# Patient Record
Sex: FEMALE | Race: WHITE | NOT HISPANIC OR LATINO | Employment: FULL TIME | URBAN - METROPOLITAN AREA
[De-identification: names, ages, dates, MRNs, and addresses within clinical notes are randomized per-mention and may not be internally consistent; named-entity substitution may affect disease eponyms.]

---

## 2017-10-18 ENCOUNTER — COMMUNICATION - HEALTHEAST (OUTPATIENT)
Dept: TELEHEALTH | Facility: CLINIC | Age: 37
End: 2017-10-18

## 2017-10-18 ENCOUNTER — COMMUNICATION - HEALTHEAST (OUTPATIENT)
Dept: HEALTH INFORMATION MANAGEMENT | Facility: CLINIC | Age: 37
End: 2017-10-18

## 2020-10-07 ENCOUNTER — AMBULATORY - HEALTHEAST (OUTPATIENT)
Dept: MULTI SPECIALTY CLINIC | Facility: CLINIC | Age: 40
End: 2020-10-07

## 2020-10-07 LAB
CHLAMYDIA_EXT- HISTORICAL: NEGATIVE
SPECIMEN DESCRIPTION_EXT (HISTORICAL CONVERSION): NORMAL

## 2020-11-16 ENCOUNTER — AMBULATORY - HEALTHEAST (OUTPATIENT)
Dept: SURGERY | Facility: HOSPITAL | Age: 40
End: 2020-11-16

## 2020-11-16 DIAGNOSIS — Z11.59 ENCOUNTER FOR SCREENING FOR OTHER VIRAL DISEASES: ICD-10-CM

## 2020-11-17 ENCOUNTER — RECORDS - HEALTHEAST (OUTPATIENT)
Dept: ADMINISTRATIVE | Facility: OTHER | Age: 40
End: 2020-11-17

## 2020-11-18 ENCOUNTER — AMBULATORY - HEALTHEAST (OUTPATIENT)
Dept: SURGERY | Facility: AMBULATORY SURGERY CENTER | Age: 40
End: 2020-11-18

## 2020-11-18 DIAGNOSIS — Z11.59 ENCOUNTER FOR SCREENING FOR OTHER VIRAL DISEASES: ICD-10-CM

## 2020-11-25 ENCOUNTER — ANESTHESIA - HEALTHEAST (OUTPATIENT)
Dept: SURGERY | Facility: AMBULATORY SURGERY CENTER | Age: 40
End: 2020-11-25

## 2020-11-25 ASSESSMENT — MIFFLIN-ST. JEOR: SCORE: 1278.24

## 2020-11-27 ENCOUNTER — AMBULATORY - HEALTHEAST (OUTPATIENT)
Dept: LAB | Facility: CLINIC | Age: 40
End: 2020-11-27

## 2020-11-27 DIAGNOSIS — Z11.59 ENCOUNTER FOR SCREENING FOR OTHER VIRAL DISEASES: ICD-10-CM

## 2020-11-29 ENCOUNTER — COMMUNICATION - HEALTHEAST (OUTPATIENT)
Dept: SCHEDULING | Facility: CLINIC | Age: 40
End: 2020-11-29

## 2020-11-30 ENCOUNTER — SURGERY - HEALTHEAST (OUTPATIENT)
Dept: SURGERY | Facility: AMBULATORY SURGERY CENTER | Age: 40
End: 2020-11-30

## 2020-11-30 ASSESSMENT — MIFFLIN-ST. JEOR: SCORE: 1278.24

## 2020-12-01 LAB
HPV SOURCE: NORMAL
HUMAN PAPILLOMA VIRUS 16 DNA: NEGATIVE
HUMAN PAPILLOMA VIRUS 18 DNA: NEGATIVE
HUMAN PAPILLOMA VIRUS FINAL DIAGNOSIS: NORMAL
HUMAN PAPILLOMA VIRUS OTHER HR: NEGATIVE
SPECIMEN DESCRIPTION: NORMAL

## 2020-12-07 ENCOUNTER — RECORDS - HEALTHEAST (OUTPATIENT)
Dept: ADMINISTRATIVE | Facility: OTHER | Age: 40
End: 2020-12-07

## 2020-12-07 LAB
BKR LAB AP ABNORMAL BLEEDING: YES
BKR LAB AP BIRTH CONTROL/HORMONES: NORMAL
BKR LAB AP CERVICAL APPEARANCE: NORMAL
BKR LAB AP GYN ADEQUACY: NORMAL
BKR LAB AP GYN INTERPRETATION: NORMAL
BKR LAB AP GYN OTHER FINDINGS: NORMAL
BKR LAB AP HPV REFLEX: NORMAL
BKR LAB AP LMP: NORMAL
BKR LAB AP PATIENT STATUS: NO
BKR LAB AP PREVIOUS ABNORMAL: NORMAL
BKR LAB AP PREVIOUS NORMAL: NORMAL
HIGH RISK?: YES
PATH REPORT.COMMENTS IMP SPEC: NORMAL
RESULT FLAG (HE HISTORICAL CONVERSION): NORMAL

## 2021-05-30 ENCOUNTER — HEALTH MAINTENANCE LETTER (OUTPATIENT)
Age: 41
End: 2021-05-30

## 2021-06-05 VITALS — WEIGHT: 135 LBS | HEIGHT: 65 IN | BODY MASS INDEX: 22.49 KG/M2

## 2021-06-13 NOTE — ANESTHESIA POSTPROCEDURE EVALUATION
Mio Delacruz is a 89-year-old gentleman with a history of superficial bladder cancer and prostate cancer.  He is poorly compliant with follow-up and has not been seen in  11 months.  His PSA today is 0.25, having undergone radiation therapy several years ago.  He is overdue for his office cystoscopy.  He complains of urinary incontinence which he can avoid if he voids every 2 hours.  He is been told many times in the past that he has an atonic bladder is that requires intermittent catheterization and he refuses to do this.  Other past medical history: Anemia, hyperlipidemia, non-smoker.  Surgeries include seed implants, cholecystectomy, cystoscopy with bladder biopsy, prostatic biopsies  Medications: Reviewed  Allergies: Azithromycin, penicillin  Review of systems: No dysuria or hematuria  Urinalysis sent for cytology  Exam: Alert and oriented, normal external genitalia  Flexible cystoscopy-normal urethra, radiated prosthetic fossa that is open.  Large bladder residual, normal bladder mucosa, no papillary tumors or raised erythema, normal ureteral orifices  Creatinine 1.11 in October last year  Assessment: Prostate cancer-stable PSA.  Bladder cancer-no evidence of recurrence.  Urinary retention with incontinence due to overflow  Plan: Antibiotic x1 today.  He will consider learning again how to do intermittent catheterization to do every morning and every evening.  Follow-up with me in 6 months for office cystoscopy and PSA   Patient: Anahy Aquino  Procedure(s):  HYSTEROSCOPY WITH DILATION AND CURETTAGE, CERVICAL POLYPECTOMY AND PLACEMENT OF MIRENA INTRAUTERINE DEVICE  Anesthesia type: MAC    Patient location: Phase II Recovery  Last vitals:   Vitals Value Taken Time   /75 11/30/20 1310   Temp 36.6  C (97.9  F) 11/30/20 1251   Pulse 92 11/30/20 1400   Resp 16 11/30/20 1310   SpO2 100 % 11/30/20 1400   Vitals shown include unvalidated device data.  Post vital signs: stable  Level of consciousness: awake and responds to simple questions  Post-anesthesia pain: pain controlled  Post-anesthesia nausea and vomiting: no  Pulmonary: unassisted, return to baseline  Cardiovascular: stable and blood pressure at baseline  Hydration: adequate  Anesthetic events: no    QCDR Measures:  ASA# 11 - Eduarda-op Cardiac Arrest: ASA11B - Patient did NOT experience unanticipated cardiac arrest  ASA# 12 - Eduarda-op Mortality Rate: ASA12B - Patient did NOT die  ASA# 13 - PACU Re-Intubation Rate: NA - No ETT / LMA used for case  ASA# 10 - Composite Anes Safety: ASA10A - No serious adverse event    Additional Notes:

## 2021-06-13 NOTE — ANESTHESIA PREPROCEDURE EVALUATION
Anesthesia Evaluation      Patient summary reviewed   No history of anesthetic complications     Airway   Mallampati: I  Neck ROM: full   Pulmonary     breath sounds clear to auscultation  (+) asthma                           Cardiovascular - normal exam  Exercise tolerance: > or = 4 METS  Rhythm: regular  Rate: normal,         Neuro/Psych    (+) neuromuscular disease,  anxiety/panic attacks,     Comments: TBI  Migraine headaches    Endo/Other       Comments: PCOS  Endometriosis   Anemia    GI/Hepatic/Renal - negative ROS           Dental - normal exam                        Anesthesia Plan  Planned anesthetic: MAC  Multimodal pain regiment: Fentanyl, ketamine and toradol if approved by surgeon.  ASA 2     Anesthetic plan and risks discussed with: patient  Anesthesia plan special considerations: antiemetics,   Post-op plan: routine recovery

## 2021-06-13 NOTE — ANESTHESIA CARE TRANSFER NOTE
Last vitals:   Vitals:    11/30/20 1251   BP: 146/84   Pulse: 93   Resp: 14   Temp: 36.6  C (97.9  F)   SpO2: 100%     Pt brought to phase 2 on 6L O2. Monitors applied. VSS.    Patient's level of consciousness is drowsy  Spontaneous respirations: yes  Maintains airway independently: yes  Dentition unchanged: yes  Oropharynx: oropharynx clear of all foreign objects    QCDR Measures:  ASA# 20 - Surgical Safety Checklist: WHO surgical safety checklist completed prior to induction    PQRS# 430 - Adult PONV Prevention: 4558F - Pt received => 2 anti-emetic agents (different classes) preop & intraop  ASA# 8 - Peds PONV Prevention: NA - Not pediatric patient, not GA or 2 or more risk factors NOT present  PQRS# 424 - Eduarda-op Temp Management: 4559F - At least one body temp DOCUMENTED => 35.5C or 95.9F within required timeframe  PQRS# 426 - PACU Transfer Protocol: - Transfer of care checklist used  ASA# 14 - Acute Post-op Pain: ASA14B - Patient did NOT experience pain >= 7 out of 10

## 2021-06-16 PROBLEM — L71.9 ROSACEA: Status: ACTIVE | Noted: 2017-08-10

## 2021-06-16 PROBLEM — R73.09 ABNORMAL GLUCOSE: Status: ACTIVE | Noted: 2018-09-26

## 2021-06-16 PROBLEM — L70.0 ACNE VULGARIS: Status: ACTIVE | Noted: 2017-08-10

## 2021-06-16 PROBLEM — R07.89 LEFT-SIDED CHEST WALL PAIN: Status: ACTIVE | Noted: 2018-09-26

## 2021-06-16 PROBLEM — Z87.42 HISTORY OF PCOS: Status: ACTIVE | Noted: 2017-08-10

## 2021-06-16 PROBLEM — F41.9 ANXIETY: Status: ACTIVE | Noted: 2017-08-10

## 2021-06-16 PROBLEM — J45.40 MODERATE PERSISTENT ASTHMA WITHOUT COMPLICATION: Status: ACTIVE | Noted: 2017-08-10

## 2021-06-16 PROBLEM — H10.023 PINK EYE DISEASE OF BOTH EYES: Status: ACTIVE | Noted: 2018-10-24

## 2021-06-16 PROBLEM — H61.23 BILATERAL IMPACTED CERUMEN: Status: ACTIVE | Noted: 2018-09-26

## 2021-06-16 PROBLEM — S06.0XAA CONCUSSION: Status: ACTIVE | Noted: 2017-08-10

## 2021-08-10 ENCOUNTER — HOSPITAL ENCOUNTER (EMERGENCY)
Facility: CLINIC | Age: 41
Discharge: HOME | End: 2021-08-10
Admitting: NURSE PRACTITIONER
Payer: COMMERCIAL

## 2021-08-10 VITALS
OXYGEN SATURATION: 99 % | DIASTOLIC BLOOD PRESSURE: 77 MMHG | RESPIRATION RATE: 22 BRPM | TEMPERATURE: 97.8 F | HEART RATE: 102 BPM | SYSTOLIC BLOOD PRESSURE: 145 MMHG
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
## 2021-08-10 DIAGNOSIS — S61.213A LACERATION OF LEFT MIDDLE FINGER WITHOUT FOREIGN BODY WITHOUT DAMAGE TO NAIL, INITIAL ENCOUNTER: ICD-10-CM

## 2021-08-10 PROCEDURE — 99283 EMERGENCY DEPT VISIT LOW MDM: CPT | Mod: 25

## 2021-08-10 PROCEDURE — 90715 TDAP VACCINE 7 YRS/> IM: CPT | Performed by: NURSE PRACTITIONER

## 2021-08-10 PROCEDURE — 250N000009 HC RX 250: Performed by: NURSE PRACTITIONER

## 2021-08-10 PROCEDURE — 250N000011 HC RX IP 250 OP 636: Performed by: NURSE PRACTITIONER

## 2021-08-10 PROCEDURE — 90471 IMMUNIZATION ADMIN: CPT | Performed by: NURSE PRACTITIONER

## 2021-08-10 PROCEDURE — 12001 RPR S/N/AX/GEN/TRNK 2.5CM/<: CPT

## 2021-08-10 RX ORDER — BACITRACIN ZINC 500 [USP'U]/G
OINTMENT TOPICAL ONCE
Status: COMPLETED | OUTPATIENT
Start: 2021-08-10 | End: 2021-08-10

## 2021-08-10 RX ADMIN — BACITRACIN: 500 OINTMENT TOPICAL at 18:13

## 2021-08-10 RX ADMIN — CLOSTRIDIUM TETANI TOXOID ANTIGEN (FORMALDEHYDE INACTIVATED), CORYNEBACTERIUM DIPHTHERIAE TOXOID ANTIGEN (FORMALDEHYDE INACTIVATED), BORDETELLA PERTUSSIS TOXOID ANTIGEN (GLUTARALDEHYDE INACTIVATED), BORDETELLA PERTUSSIS FILAMENTOUS HEMAGGLUTININ ANTIGEN (FORMALDEHYDE INACTIVATED), BORDETELLA PERTUSSIS PERTACTIN ANTIGEN, AND BORDETELLA PERTUSSIS FIMBRIAE 2/3 ANTIGEN 0.5 ML: 5; 2; 2.5; 5; 3; 5 INJECTION, SUSPENSION INTRAMUSCULAR at 17:15

## 2021-08-10 ASSESSMENT — ENCOUNTER SYMPTOMS
NUMBNESS: 0
WEAKNESS: 0

## 2021-08-10 NOTE — ED NOTES
Introduced self to patient.  Whiteboard updated.  Plan of care and length of time discussed with patient.  Will continue to monitor. Carolina Hernandez RN.......8/10/2021 6:19 PM

## 2021-08-10 NOTE — ED PROVIDER NOTES
EMERGENCY DEPARTMENT ENCOUNTER      NAME: Anahy Aquino  AGE: 41 year old female  YOB: 1980  MRN: [id number]  EVALUATION DATE & TIME: 8/10/2021  4:39 PM    PCP: No primary care provider on file.    ED PROVIDER: SASHA Hi, CNP      Chief Complaint   Patient presents with     Laceration         FINAL IMPRESSION:  1. Laceration of left middle finger without foreign body without damage to nail, initial encounter    2. Dog bite, initial encounter          ED COURSE & MEDICAL DECISION MAKIN:49 PM I met with the patient, obtained history, performed an initial exam, and discussed options and plan for treatment here in the ED.    5:14 PM I injected more anesthetic to patient's laceration.    5:33 PM I performed laceration repair on the patient.     Pertinent Labs & Imaging studies reviewed. (See chart for details)  41 year old female presents to the Emergency Department for evaluation of laceration to the left middle finger.  This was caused by a dog.  Dog is known and up-to-date with immunizations and can be observed.  Patient's tetanus was updated.  CMS intact in the left hand.  Wound was soaked, irrigated, and then surgically repaired.  Placed on a 5-day course of Augmentin.  She states she can tolerate Augmentin though amoxicillin is listed as an allergy.  Recommend suture removal in 10 days.  Advised to return with any new/worsening symptoms or for other concerns    At the conclusion of the encounter I discussed the results of all of the tests and the disposition. The questions were answered. The patient or family acknowledged understanding and was agreeable with the care plan.       MEDICATIONS GIVEN IN THE EMERGENCY:  Medications   bacitracin ointment (has no administration in time range)   Tdap (tetanus-diphtheria-acell pertussis) (ADACEL) injection 0.5 mL (0.5 mLs Intramuscular Given 8/10/21 1315)       NEW PRESCRIPTIONS STARTED AT TODAY'S ER VISIT  New Prescriptions     "AMOXICILLIN-CLAVULANATE (AUGMENTIN) 875-125 MG TABLET    Take 1 tablet by mouth 2 times daily for 5 days            =================================================================    HPI    Patient information was obtained from: patient     Use of Intrepreter: N/A      Anahy Aquino is a 41 year old female with no relevant history who presents finger laceration.    Patient reports she was trying to keep the dog inside when it became \"cranky\" and bit her left middle finger developing 2 1/2 cm laceration. She ran the laceration under water immediately, bleeding was controlled, wrapped and elevated. Dog is UTD with immunizations. Last tetanus shot was in 2013 and she agrees to get another shot if needed. Notes she is allergic to morphine and amoxicillin. Denies numbness, weakness. No other complaints at this time.       REVIEW OF SYSTEMS   Review of Systems   Skin:        Positive for 2 1/2 cm laceration on left middle finger.   Neurological: Negative for weakness and numbness.   All other systems reviewed and are negative.       PAST MEDICAL HISTORY:  Past Medical History:   Diagnosis Date     Anemia      Anxiety      Asthma      Endometriosis      PCOS (polycystic ovarian syndrome)        PAST SURGICAL HISTORY:  Past Surgical History:   Procedure Laterality Date     ANKLE SURGERY       BRACHIAL PLEXUS EXPLORATION       LAPAROSCOPIC ENDOMETRIOSIS FULGURATION       GA HYSTEROSCOPY,W/ENDO BX N/A 11/30/2020    Procedure: HYSTEROSCOPY WITH DILATION AND CURETTAGE, CERVICAL POLYPECTOMY AND PLACEMENT OF MIRENA INTRAUTERINE DEVICE;  Surgeon: Jossy Rodriguez MD;  Location: [facility];  Service: Gynecology     TEAR DUCT SURGERY             CURRENT MEDICATIONS:    Prior to Admission Medications   Prescriptions Last Dose Informant Patient Reported? Taking?   DULoxetine (CYMBALTA) 30 MG capsule   Yes No   Sig: [DULOXETINE (CYMBALTA) 30 MG CAPSULE] Take 60 mg by mouth.   LORazepam (ATIVAN) 1 MG tablet   Yes No "   Sig: [LORAZEPAM (ATIVAN) 1 MG TABLET]    SYMBICORT 160-4.5 mcg/actuation inhaler   Yes No   Sig: [SYMBICORT 160-4.5 MCG/ACTUATION INHALER] INL 2 PFS PO BID   acetaminophen-codeine (TYLENOL #3) 300-30 mg per tablet   No No   Sig: [ACETAMINOPHEN-CODEINE (TYLENOL #3) 300-30 MG PER TABLET] Take 1-2 tablets by mouth every 6 (six) hours as needed for pain.   acetaminophen-codeine (TYLENOL #3) 300-30 mg per tablet   No No   Sig: [ACETAMINOPHEN-CODEINE (TYLENOL #3) 300-30 MG PER TABLET] Take 1 tablet by mouth every 6 (six) hours as needed for pain.   albuterol (PROAIR HFA;PROVENTIL HFA;VENTOLIN HFA) 90 mcg/actuation inhaler   Yes No   Sig: [ALBUTEROL (PROAIR HFA;PROVENTIL HFA;VENTOLIN HFA) 90 MCG/ACTUATION INHALER] Inhale 2 puffs.   montelukast (SINGULAIR) 10 mg tablet   Yes No   Sig: [MONTELUKAST (SINGULAIR) 10 MG TABLET] TK 1 T PO HS      Facility-Administered Medications: None           ALLERGIES:  Allergies   Allergen Reactions     Amoxicillin Unknown     Letrozole Unknown     Vicodin [Hydrocodone-Acetaminophen] Unknown       FAMILY HISTORY:  History reviewed. No pertinent family history.    SOCIAL HISTORY:   Social History     Socioeconomic History     Marital status:      Spouse name: None     Number of children: None     Years of education: None     Highest education level: None   Occupational History     None   Tobacco Use     Smoking status: Never Smoker     Smokeless tobacco: Never Used   Substance and Sexual Activity     Alcohol use: Yes     Drug use: Never     Sexual activity: None   Other Topics Concern     None   Social History Narrative     None     Social Determinants of Health     Financial Resource Strain:      Difficulty of Paying Living Expenses:    Food Insecurity:      Worried About Running Out of Food in the Last Year:      Ran Out of Food in the Last Year:    Transportation Needs:      Lack of Transportation (Medical):      Lack of Transportation (Non-Medical):    Physical Activity:       Days of Exercise per Week:      Minutes of Exercise per Session:    Stress:      Feeling of Stress :    Social Connections:      Frequency of Communication with Friends and Family:      Frequency of Social Gatherings with Friends and Family:      Attends [employer] Services:      Active Member of Clubs or Organizations:      Attends Club or Organization Meetings:      Marital Status:    Intimate Partner Violence:      Fear of Current or Ex-Partner:      Emotionally Abused:      Physically Abused:      Sexually Abused:          VITALS:  Patient Vitals for the past 24 hrs:   BP Temp Temp src Pulse Resp SpO2   08/10/21 1634 (!) 145/77 97.8  F (36.6  C) Oral 102 22 99 %       PHYSICAL EXAM    Constitutional:  Well developed, well nourished, no distress   HENT:  Atraumatic  Respiratory:  Normal, nonlabored respirations  Musculoskeletal:  Able to move all digits. All 3 layers of tendon function intact. Intrinsic muscle function of the left thumb intact. Motor and sensory function in the radial, medial, and ulnar distributions intact.   Integument: 2.5 cm, linear laceration on the left middle finger between the MCP and PIP joint.  It is on the surface adjacent to the ring finger.  Does extend into subcutaneous tissue.  I cannot visualize and the underlying tendon injury.  Explored to the base in a bloodless field and no foreign body was seen.  Neurologic:  Alert and oriented x 3        LAB:  All pertinent labs reviewed and interpreted.       RADIOLOGY:  Reviewed all pertinent imaging. Please see official radiology report.  No orders to display         PROCEDURES:   PROCEDURE: Laceration Repair   INDICATIONS: Laceration   PROCEDURE PROVIDER:  Juan Pratt CNP   SITE: Left middle finger   TYPE/SIZE: simple, clean and no foreign body visualized  2 1/2 cm (total length)   FUNCTIONAL ASSESSMENT: Distal sensation, circulation and motor intact   MEDICATION: 6 mLs of 50/50 mix of 1% Lidocaine and 0.25% Bupivacaine without  epinephrine   PREPARATION: soaking with Betadine and Normal Saline   DEBRIDEMENT: no debridement   CLOSURE:  Wound was closed in   one layer: Skin closed with 5 stitches of 4-0 Ethilon    Total number of sutures/staples placed: 5            I, Roxana Sanchez, am serving as a scribe to document services personally performed by Juan Pratt CNP. based on my observation and the provider's statements to me. IJuan CNP attest that Roxana Sanchez is acting in a scribe capacity, has observed my performance of the services and has documented them in accordance with my direction.    SASHA Hi, CNP  Emergency Medicine  [facility] EMERGENCY ROOM  [address] [place] [id number]  [phone]  Dept: [phone]         Juan Pratt APRN CNP  08/10/21 2156

## 2021-08-10 NOTE — ED TRIAGE NOTES
Laceration to finger from a dog bite.  Pt states she was told dog is UTD on immunizations.  Bleeding controlled.  Pt thinks her last tetanus in 2016 but system says 2013.

## 2021-08-10 NOTE — DISCHARGE INSTRUCTIONS
We cleaned and closed the wound in the ER today.      TO CARE FOR THE WOUND AT HOME:  Keep the wound CLEAN,DRY and COVERED until the sutures are out.  Take Ibuprofen 600mg every 6 hours with food as needed for any pain.  This wound is high risk for infection.  To prevent infection, take Augmentin as prescribed.  Watch for signs of infection (redness, increasing pain or yellow drainage) and if they develop, come back to the Emergency Department immediately for treatment.  Follow up in clinic in 10 days for sutureremoval.   ---------------------------------------------------------------------------------------------------

## 2021-08-11 ENCOUNTER — HOSPITAL ENCOUNTER (INPATIENT)
Facility: CLINIC | Age: 41
LOS: 3 days | Discharge: HOME | End: 2021-08-14
Attending: EMERGENCY MEDICINE | Admitting: FAMILY MEDICINE
Payer: COMMERCIAL

## 2021-08-11 ENCOUNTER — APPOINTMENT (OUTPATIENT)
Dept: MRI IMAGING | Facility: CLINIC | Age: 41
End: 2021-08-11
Attending: PHYSICIAN ASSISTANT
Payer: COMMERCIAL

## 2021-08-11 ENCOUNTER — APPOINTMENT (OUTPATIENT)
Dept: RADIOLOGY | Facility: CLINIC | Age: 41
End: 2021-08-11
Attending: EMERGENCY MEDICINE
Payer: COMMERCIAL

## 2021-08-11 DIAGNOSIS — Z78.9 HISTORY OF DOG BITE: ICD-10-CM

## 2021-08-11 DIAGNOSIS — E87.6 HYPOKALEMIA: ICD-10-CM

## 2021-08-11 DIAGNOSIS — L03.114 CELLULITIS OF HAND, LEFT: ICD-10-CM

## 2021-08-11 DIAGNOSIS — M65.141 SUPPURATIVE TENOSYNOVITIS OF FLEXOR TENDON OF RIGHT HAND: ICD-10-CM

## 2021-08-11 LAB
ANION GAP SERPL CALCULATED.3IONS-SCNC: 10 MMOL/L (ref 5–18)
BASOPHILS # BLD AUTO: 0.1 10E3/UL (ref 0–0.2)
BASOPHILS NFR BLD AUTO: 0 %
BUN SERPL-MCNC: 10 MG/DL (ref 8–22)
C REACTIVE PROTEIN LHE: 2.4 MG/DL (ref 0–0.8)
CALCIUM SERPL-MCNC: 8.2 MG/DL (ref 8.5–10.5)
CHLORIDE BLD-SCNC: 106 MMOL/L (ref 98–107)
CO2 SERPL-SCNC: 23 MMOL/L (ref 22–31)
CREAT SERPL-MCNC: 0.7 MG/DL (ref 0.6–1.1)
EOSINOPHIL # BLD AUTO: 0.1 10E3/UL (ref 0–0.7)
EOSINOPHIL NFR BLD AUTO: 1 %
ERYTHROCYTE [DISTWIDTH] IN BLOOD BY AUTOMATED COUNT: 13.8 % (ref 10–15)
ERYTHROCYTE [DISTWIDTH] IN BLOOD BY AUTOMATED COUNT: 13.9 % (ref 10–15)
GFR SERPL CREATININE-BSD FRML MDRD: >90 ML/MIN/1.73M2
GLUCOSE BLD-MCNC: 85 MG/DL (ref 70–125)
HCT VFR BLD AUTO: 36.8 % (ref 35–47)
HCT VFR BLD AUTO: 37.4 % (ref 35–47)
HGB BLD-MCNC: 12.1 G/DL (ref 11.7–15.7)
HGB BLD-MCNC: 12.5 G/DL (ref 11.7–15.7)
HOLD SPECIMEN: NORMAL
IMM GRANULOCYTES # BLD: 0.1 10E3/UL
IMM GRANULOCYTES NFR BLD: 0 %
LACTATE SERPL-SCNC: 0.8 MMOL/L (ref 0.7–2)
LYMPHOCYTES # BLD AUTO: 2.2 10E3/UL (ref 0.8–5.3)
LYMPHOCYTES NFR BLD AUTO: 15 %
MAGNESIUM SERPL-MCNC: 2.2 MG/DL (ref 1.8–2.6)
MCH RBC QN AUTO: 27.9 PG (ref 26.5–33)
MCH RBC QN AUTO: 28.5 PG (ref 26.5–33)
MCHC RBC AUTO-ENTMCNC: 32.9 G/DL (ref 31.5–36.5)
MCHC RBC AUTO-ENTMCNC: 33.4 G/DL (ref 31.5–36.5)
MCV RBC AUTO: 85 FL (ref 78–100)
MCV RBC AUTO: 85 FL (ref 78–100)
MONOCYTES # BLD AUTO: 0.7 10E3/UL (ref 0–1.3)
MONOCYTES NFR BLD AUTO: 5 %
NEUTROPHILS # BLD AUTO: 11.3 10E3/UL (ref 1.6–8.3)
NEUTROPHILS NFR BLD AUTO: 79 %
NRBC # BLD AUTO: 0 10E3/UL
NRBC BLD AUTO-RTO: 0 /100
PLATELET # BLD AUTO: 392 10E3/UL (ref 150–450)
PLATELET # BLD AUTO: 398 10E3/UL (ref 150–450)
POTASSIUM BLD-SCNC: 3 MMOL/L (ref 3.5–5)
POTASSIUM BLD-SCNC: 3.2 MMOL/L (ref 3.5–5)
POTASSIUM BLD-SCNC: 3.4 MMOL/L (ref 3.5–5)
RBC # BLD AUTO: 4.33 10E6/UL (ref 3.8–5.2)
RBC # BLD AUTO: 4.39 10E6/UL (ref 3.8–5.2)
SARS-COV-2 RNA RESP QL NAA+PROBE: NEGATIVE
SODIUM SERPL-SCNC: 139 MMOL/L (ref 136–145)
WBC # BLD AUTO: 14.4 10E3/UL (ref 4–11)
WBC # BLD AUTO: 14.4 10E3/UL (ref 4–11)

## 2021-08-11 PROCEDURE — 87040 BLOOD CULTURE FOR BACTERIA: CPT | Performed by: FAMILY MEDICINE

## 2021-08-11 PROCEDURE — 250N000013 HC RX MED GY IP 250 OP 250 PS 637: Performed by: FAMILY MEDICINE

## 2021-08-11 PROCEDURE — 36415 COLL VENOUS BLD VENIPUNCTURE: CPT | Performed by: FAMILY MEDICINE

## 2021-08-11 PROCEDURE — 84132 ASSAY OF SERUM POTASSIUM: CPT | Performed by: FAMILY MEDICINE

## 2021-08-11 PROCEDURE — 83735 ASSAY OF MAGNESIUM: CPT | Performed by: EMERGENCY MEDICINE

## 2021-08-11 PROCEDURE — 86141 C-REACTIVE PROTEIN HS: CPT | Performed by: EMERGENCY MEDICINE

## 2021-08-11 PROCEDURE — 120N000001 HC R&B MED SURG/OB

## 2021-08-11 PROCEDURE — 87635 SARS-COV-2 COVID-19 AMP PRB: CPT | Performed by: EMERGENCY MEDICINE

## 2021-08-11 PROCEDURE — 36415 COLL VENOUS BLD VENIPUNCTURE: CPT | Performed by: EMERGENCY MEDICINE

## 2021-08-11 PROCEDURE — 36415 COLL VENOUS BLD VENIPUNCTURE: CPT | Performed by: PHYSICIAN ASSISTANT

## 2021-08-11 PROCEDURE — 258N000003 HC RX IP 258 OP 636: Performed by: FAMILY MEDICINE

## 2021-08-11 PROCEDURE — 99207 PR CDG-CODE CATEGORY CHANGED: CPT | Performed by: FAMILY MEDICINE

## 2021-08-11 PROCEDURE — 80048 BASIC METABOLIC PNL TOTAL CA: CPT | Performed by: EMERGENCY MEDICINE

## 2021-08-11 PROCEDURE — C9803 HOPD COVID-19 SPEC COLLECT: HCPCS

## 2021-08-11 PROCEDURE — 83605 ASSAY OF LACTIC ACID: CPT | Performed by: FAMILY MEDICINE

## 2021-08-11 PROCEDURE — 85025 COMPLETE CBC W/AUTO DIFF WBC: CPT | Performed by: EMERGENCY MEDICINE

## 2021-08-11 PROCEDURE — 96367 TX/PROPH/DG ADDL SEQ IV INF: CPT

## 2021-08-11 PROCEDURE — 99223 1ST HOSP IP/OBS HIGH 75: CPT | Performed by: FAMILY MEDICINE

## 2021-08-11 PROCEDURE — 85014 HEMATOCRIT: CPT | Performed by: PHYSICIAN ASSISTANT

## 2021-08-11 PROCEDURE — 73130 X-RAY EXAM OF HAND: CPT | Mod: LT

## 2021-08-11 PROCEDURE — 96365 THER/PROPH/DIAG IV INF INIT: CPT

## 2021-08-11 PROCEDURE — 99285 EMERGENCY DEPT VISIT HI MDM: CPT | Mod: 25

## 2021-08-11 PROCEDURE — 96375 TX/PRO/DX INJ NEW DRUG ADDON: CPT

## 2021-08-11 PROCEDURE — 73218 MRI UPPER EXTREMITY W/O DYE: CPT | Mod: LT

## 2021-08-11 PROCEDURE — 36592 COLLECT BLOOD FROM PICC: CPT | Performed by: INTERNAL MEDICINE

## 2021-08-11 PROCEDURE — 258N000003 HC RX IP 258 OP 636: Performed by: EMERGENCY MEDICINE

## 2021-08-11 PROCEDURE — 250N000011 HC RX IP 250 OP 636: Performed by: EMERGENCY MEDICINE

## 2021-08-11 PROCEDURE — 250N000011 HC RX IP 250 OP 636: Performed by: FAMILY MEDICINE

## 2021-08-11 PROCEDURE — 84703 CHORIONIC GONADOTROPIN ASSAY: CPT | Performed by: INTERNAL MEDICINE

## 2021-08-11 RX ORDER — LORAZEPAM 1 MG/1
1 TABLET ORAL DAILY PRN
Status: DISCONTINUED | OUTPATIENT
Start: 2021-08-11 | End: 2021-08-14 | Stop reason: HOSPADM

## 2021-08-11 RX ORDER — KETOROLAC TROMETHAMINE 15 MG/ML
15 INJECTION, SOLUTION INTRAMUSCULAR; INTRAVENOUS ONCE
Status: COMPLETED | OUTPATIENT
Start: 2021-08-11 | End: 2021-08-11

## 2021-08-11 RX ORDER — POTASSIUM CHLORIDE 1500 MG/1
40 TABLET, EXTENDED RELEASE ORAL ONCE
Status: COMPLETED | OUTPATIENT
Start: 2021-08-11 | End: 2021-08-11

## 2021-08-11 RX ORDER — ALBUTEROL SULFATE 90 UG/1
2 AEROSOL, METERED RESPIRATORY (INHALATION) EVERY 6 HOURS PRN
Status: DISCONTINUED | OUTPATIENT
Start: 2021-08-11 | End: 2021-08-14 | Stop reason: HOSPADM

## 2021-08-11 RX ORDER — ONDANSETRON 2 MG/ML
4 INJECTION INTRAMUSCULAR; INTRAVENOUS EVERY 6 HOURS PRN
Status: DISCONTINUED | OUTPATIENT
Start: 2021-08-11 | End: 2021-08-14 | Stop reason: HOSPADM

## 2021-08-11 RX ORDER — MONTELUKAST SODIUM 10 MG/1
10 TABLET ORAL AT BEDTIME
Status: DISCONTINUED | OUTPATIENT
Start: 2021-08-11 | End: 2021-08-14 | Stop reason: HOSPADM

## 2021-08-11 RX ORDER — NALOXONE HYDROCHLORIDE 0.4 MG/ML
0.2 INJECTION, SOLUTION INTRAMUSCULAR; INTRAVENOUS; SUBCUTANEOUS
Status: DISCONTINUED | OUTPATIENT
Start: 2021-08-11 | End: 2021-08-14 | Stop reason: HOSPADM

## 2021-08-11 RX ORDER — PIPERACILLIN SODIUM, TAZOBACTAM SODIUM 3; .375 G/15ML; G/15ML
3.38 INJECTION, POWDER, LYOPHILIZED, FOR SOLUTION INTRAVENOUS EVERY 8 HOURS
Status: DISCONTINUED | OUTPATIENT
Start: 2021-08-11 | End: 2021-08-14 | Stop reason: HOSPADM

## 2021-08-11 RX ORDER — ACETAMINOPHEN 325 MG/1
975 TABLET ORAL 3 TIMES DAILY
Status: DISCONTINUED | OUTPATIENT
Start: 2021-08-11 | End: 2021-08-14 | Stop reason: HOSPADM

## 2021-08-11 RX ORDER — PIPERACILLIN SODIUM, TAZOBACTAM SODIUM 3; .375 G/15ML; G/15ML
3.38 INJECTION, POWDER, LYOPHILIZED, FOR SOLUTION INTRAVENOUS ONCE
Status: COMPLETED | OUTPATIENT
Start: 2021-08-11 | End: 2021-08-11

## 2021-08-11 RX ORDER — BUDESONIDE AND FORMOTEROL FUMARATE DIHYDRATE 160; 4.5 UG/1; UG/1
2 AEROSOL RESPIRATORY (INHALATION) 2 TIMES DAILY
Status: DISCONTINUED | OUTPATIENT
Start: 2021-08-11 | End: 2021-08-14 | Stop reason: HOSPADM

## 2021-08-11 RX ORDER — POTASSIUM CHLORIDE 7.45 MG/ML
10 INJECTION INTRAVENOUS ONCE
Status: COMPLETED | OUTPATIENT
Start: 2021-08-11 | End: 2021-08-11

## 2021-08-11 RX ORDER — HYDROMORPHONE HCL IN WATER/PF 6 MG/30 ML
.2-.4 PATIENT CONTROLLED ANALGESIA SYRINGE INTRAVENOUS
Status: DISCONTINUED | OUTPATIENT
Start: 2021-08-11 | End: 2021-08-14 | Stop reason: HOSPADM

## 2021-08-11 RX ORDER — LORATADINE PSEUDOEPHEDRINE SULFATE 10; 240 MG/1; MG/1
1 TABLET, EXTENDED RELEASE ORAL DAILY
COMMUNITY
End: 2023-07-18

## 2021-08-11 RX ORDER — ONDANSETRON 4 MG/1
4 TABLET, ORALLY DISINTEGRATING ORAL EVERY 6 HOURS PRN
Status: DISCONTINUED | OUTPATIENT
Start: 2021-08-11 | End: 2021-08-14 | Stop reason: HOSPADM

## 2021-08-11 RX ORDER — LORAZEPAM 2 MG/ML
1 INJECTION INTRAMUSCULAR ONCE
Status: COMPLETED | OUTPATIENT
Start: 2021-08-11 | End: 2021-08-11

## 2021-08-11 RX ORDER — NALOXONE HYDROCHLORIDE 0.4 MG/ML
0.4 INJECTION, SOLUTION INTRAMUSCULAR; INTRAVENOUS; SUBCUTANEOUS
Status: DISCONTINUED | OUTPATIENT
Start: 2021-08-11 | End: 2021-08-14 | Stop reason: HOSPADM

## 2021-08-11 RX ORDER — LIDOCAINE 40 MG/G
CREAM TOPICAL
Status: DISCONTINUED | OUTPATIENT
Start: 2021-08-11 | End: 2021-08-14 | Stop reason: HOSPADM

## 2021-08-11 RX ORDER — MORPHINE SULFATE 4 MG/ML
4 INJECTION, SOLUTION INTRAMUSCULAR; INTRAVENOUS ONCE
Status: COMPLETED | OUTPATIENT
Start: 2021-08-11 | End: 2021-08-11

## 2021-08-11 RX ORDER — ACETAMINOPHEN 500 MG
1000 TABLET ORAL EVERY 6 HOURS PRN
COMMUNITY
End: 2023-02-28

## 2021-08-11 RX ORDER — DULOXETIN HYDROCHLORIDE 60 MG/1
60 CAPSULE, DELAYED RELEASE ORAL DAILY
Status: DISCONTINUED | OUTPATIENT
Start: 2021-08-12 | End: 2021-08-14 | Stop reason: HOSPADM

## 2021-08-11 RX ORDER — SODIUM CHLORIDE 9 MG/ML
INJECTION, SOLUTION INTRAVENOUS CONTINUOUS
Status: DISCONTINUED | OUTPATIENT
Start: 2021-08-11 | End: 2021-08-12

## 2021-08-11 RX ORDER — POTASSIUM CHLORIDE 1500 MG/1
40 TABLET, EXTENDED RELEASE ORAL ONCE
Status: COMPLETED | OUTPATIENT
Start: 2021-08-12 | End: 2021-08-12

## 2021-08-11 RX ORDER — POLYETHYLENE GLYCOL 3350 17 G/17G
17 POWDER, FOR SOLUTION ORAL DAILY
Status: DISCONTINUED | OUTPATIENT
Start: 2021-08-11 | End: 2021-08-12

## 2021-08-11 RX ORDER — HYDRALAZINE HYDROCHLORIDE 20 MG/ML
5 INJECTION INTRAMUSCULAR; INTRAVENOUS EVERY 6 HOURS PRN
Status: DISCONTINUED | OUTPATIENT
Start: 2021-08-11 | End: 2021-08-14 | Stop reason: HOSPADM

## 2021-08-11 RX ORDER — IBUPROFEN 600 MG/1
600 TABLET, FILM COATED ORAL EVERY 6 HOURS PRN
COMMUNITY
End: 2023-02-28

## 2021-08-11 RX ORDER — OXYCODONE HYDROCHLORIDE 5 MG/1
5-10 TABLET ORAL EVERY 4 HOURS PRN
Status: DISCONTINUED | OUTPATIENT
Start: 2021-08-11 | End: 2021-08-14 | Stop reason: HOSPADM

## 2021-08-11 RX ADMIN — KETOROLAC TROMETHAMINE 15 MG: 15 INJECTION, SOLUTION INTRAMUSCULAR; INTRAVENOUS at 10:34

## 2021-08-11 RX ADMIN — HYDROMORPHONE HYDROCHLORIDE 0.4 MG: 0.2 INJECTION, SOLUTION INTRAMUSCULAR; INTRAVENOUS; SUBCUTANEOUS at 16:49

## 2021-08-11 RX ADMIN — LORAZEPAM 1 MG: 1 TABLET ORAL at 15:41

## 2021-08-11 RX ADMIN — BUDESONIDE AND FORMOTEROL FUMARATE DIHYDRATE 2 PUFF: 160; 4.5 AEROSOL RESPIRATORY (INHALATION) at 21:00

## 2021-08-11 RX ADMIN — PIPERACILLIN AND TAZOBACTAM 3.38 G: 3; .375 INJECTION, POWDER, LYOPHILIZED, FOR SOLUTION INTRAVENOUS at 09:37

## 2021-08-11 RX ADMIN — ACETAMINOPHEN 975 MG: 325 TABLET ORAL at 14:58

## 2021-08-11 RX ADMIN — POTASSIUM CHLORIDE 40 MEQ: 1500 TABLET, EXTENDED RELEASE ORAL at 16:49

## 2021-08-11 RX ADMIN — MONTELUKAST 10 MG: 10 TABLET, FILM COATED ORAL at 21:00

## 2021-08-11 RX ADMIN — POTASSIUM CHLORIDE 10 MEQ: 7.46 INJECTION, SOLUTION INTRAVENOUS at 11:51

## 2021-08-11 RX ADMIN — OXYCODONE HYDROCHLORIDE 5 MG: 5 TABLET ORAL at 14:58

## 2021-08-11 RX ADMIN — PIPERACILLIN AND TAZOBACTAM 3.38 G: 3; .375 INJECTION, POWDER, LYOPHILIZED, FOR SOLUTION INTRAVENOUS at 16:00

## 2021-08-11 RX ADMIN — ACETAMINOPHEN 975 MG: 325 TABLET ORAL at 20:59

## 2021-08-11 RX ADMIN — SODIUM CHLORIDE 1000 ML: 9 INJECTION, SOLUTION INTRAVENOUS at 11:51

## 2021-08-11 RX ADMIN — LORAZEPAM 1 MG: 2 INJECTION INTRAMUSCULAR; INTRAVENOUS at 18:43

## 2021-08-11 RX ADMIN — VANCOMYCIN HYDROCHLORIDE 1000 MG: 5 INJECTION, POWDER, LYOPHILIZED, FOR SOLUTION INTRAVENOUS at 16:00

## 2021-08-11 RX ADMIN — OXYCODONE HYDROCHLORIDE 10 MG: 5 TABLET ORAL at 21:35

## 2021-08-11 RX ADMIN — MORPHINE SULFATE 4 MG: 4 INJECTION, SOLUTION INTRAMUSCULAR; INTRAVENOUS at 09:33

## 2021-08-11 ASSESSMENT — ENCOUNTER SYMPTOMS
DIAPHORESIS: 1
DIZZINESS: 1
COLOR CHANGE: 1
WOUND: 1

## 2021-08-11 ASSESSMENT — ACTIVITIES OF DAILY LIVING (ADL)
TOILETING_ISSUES: NO
WALKING_OR_CLIMBING_STAIRS_DIFFICULTY: NO
CONCENTRATING,_REMEMBERING_OR_MAKING_DECISIONS_DIFFICULTY: NO
DEPENDENT_IADLS:: INDEPENDENT
DRESSING/BATHING_DIFFICULTY: NO
WEAR_GLASSES_OR_BLIND: NO
DIFFICULTY_EATING/SWALLOWING: NO
DIFFICULTY_COMMUNICATING: NO
DOING_ERRANDS_INDEPENDENTLY_DIFFICULTY: NO

## 2021-08-11 ASSESSMENT — MIFFLIN-ST. JEOR: SCORE: 1382.11

## 2021-08-11 NOTE — ED PROVIDER NOTES
EMERGENCY DEPARTMENT ENCOUNTER      NAME: Anahy Aquino  AGE: 41 year old female  YOB: 1980  MRN: [id number]  EVALUATION DATE & TIME: 8/11/2021  7:44 AM    PCP: Sagar Bailey    ED PROVIDER: Nani Matos M.D.      CHIEF COMPLAINT     Chief Complaint   Patient presents with     Cellulitis     Dog Bite         FINAL IMPRESSION:     1. Cellulitis of hand, left    2. History of dog bite    3. Suppurative tenosynovitis of flexor tendon of right hand    4. Hypokalemia          MEDICAL DECISION MAKING:       Pertinent Labs & Imaging studies reviewed. (See chart for details)    41 year old female presents to the Emergency Department for evaluation of pain left hand and swelling    ED Course as of Aug 11 1425   Wed Aug 11, 2021   1421 1-year-old female presents her complaining of hand swelling and redness and pain and numbness on the third digit.  She was bitten by a dog yesterday was seen here had the laceration repair her tetanus updated according to patient was started on Augmentin she is been taking the medications but have noticed now that the hand is more swollen more red and she has numbness on the third finger.      1423 Also complains of feeling dizzy lightheaded.  Denies any new trauma.      1423 On exam she is nontoxic-appearing here with 2 young daughters.  Although swelling and redness of the left hand.  Incision is intact.  She has tenderness to palpation around the flexor and extensor tendon.  Capillary fill time 3 seconds.  2 peripheral pulses.  She is unable to flex the third digit.      1424 X-ray reviewed.  Started on Zosyn given morphine for pain.  I spoke with Dr. Han hand surgery expressed my concern about flexor tendon tenosynovitis.  He states group physician assistant will come see patient.  Admitting physician at bedside patient admitted in stable condition for further evaluation by hand surgery.      1424 K low this was replaced normal magnesium.            Differential  Diagnosis (include but not limited to)  Cellulitis necrotizing fasciitis retained foreign body flexor tendon tenosynovitis, and others.      Vital Signs: reviewed  EKG: none  Imaging: hand swelling  Home Meds: reivewed  ED meds/abx: morphine zosyn  Fluids: ns    Labs  K 3  Cr 0.70  Wbc 14.4  Hgb 12.5  Platelets 398  crp 2.4      Review of Previous Records  ED visit 8/11/21  MDM: 41 year old female presents to the Emergency Department for evaluation of laceration to the left middle finger.  This was caused by a dog.  Dog is known and up-to-date with immunizations and can be observed.  Patient's tetanus was updated.  CMS intact in the left hand.  Wound was soaked, irrigated, and then surgically repaired.  Placed on a 5-day course of Augmentin.  She states she can tolerate Augmentin though amoxicillin is listed as an allergy.  Recommend suture removal in 10 days.  Advised to return with any new/worsening symptoms or for other concerns    Consults  Orthopedics hand surgery - Dr. Han  Pharmacy  Hospitalist - Dr. Warren    ED COURSE   8:27 AM I met with the patient to gather history and to perform my initial exam. I discussed the plan for care while in the Emergency Department. PPE (gloves, eye protection, surgical cap, N95 mask, and surgical mask) was worn by me during patient encounters while patient wore mask.    8:57 AM I discussed patient's case with Dr. Han, hand surgery.  10:05 AM I re-evaluated patient.  10:59 AM Paged admitting hospitalist.  11:21 AM I re-evaluated patient.  11:24 AM Paged admitting hospitalist.  11:25 AM I discussed the case with hospitalist, Dr. Warren, who accepts the patient for admission to [facility] inpatient.  11:59 AM Paged orthopedic hand surgery.  12:03 PM I spoke with hand surgery and someone will come to evaluate patient.  12:13 PM Admitting physician at bedside.    At the conclusion of the encounter I discussed the results of all of the tests and the disposition. The  questions were answered. The patient acknowledged understanding and was agreeable with the care plan.           MEDICATIONS GIVEN IN THE EMERGENCY:     Medications   albuterol (PROAIR HFA/PROVENTIL HFA/VENTOLIN HFA) 108 (90 Base) MCG/ACT inhaler 2 puff (has no administration in time range)   DULoxetine (CYMBALTA) DR capsule 60 mg (has no administration in time range)   LORazepam (ATIVAN) tablet 1 mg (has no administration in time range)   montelukast (SINGULAIR) tablet 10 mg (has no administration in time range)   norethin-eth estradiol-fe (GILDESS 24 FE) 1-20 MG-MCG(24) tablet 1 tablet (has no administration in time range)   budesonide-formoterol (SYMBICORT) 160-4.5 MCG/ACT Inhaler 2 puff (has no administration in time range)   piperacillin-tazobactam (ZOSYN) 3.375 g vial to attach to  mL bag (has no administration in time range)   acetaminophen (TYLENOL) tablet 975 mg (has no administration in time range)   oxyCODONE (ROXICODONE) tablet 5-10 mg (has no administration in time range)   HYDROmorphone (DILAUDID) injection 0.2-0.4 mg (has no administration in time range)   lidocaine 1 % 0.1-1 mL (has no administration in time range)   lidocaine (LMX4) cream (has no administration in time range)   sodium chloride (PF) 0.9% PF flush 3 mL (3 mLs Intracatheter Not Given 8/11/21 1340)   sodium chloride (PF) 0.9% PF flush 3 mL (has no administration in time range)   melatonin tablet 1 mg (has no administration in time range)   sodium chloride 0.9% infusion (has no administration in time range)   polyethylene glycol (MIRALAX) Packet 17 g (17 g Oral Not Given 8/11/21 1406)   ondansetron (ZOFRAN-ODT) ODT tab 4 mg (has no administration in time range)     Or   ondansetron (ZOFRAN) injection 4 mg (has no administration in time range)   hydrALAZINE (APRESOLINE) injection 5 mg (has no administration in time range)   naloxone (NARCAN) injection 0.2 mg (has no administration in time range)     Or   naloxone (NARCAN) injection  0.4 mg (has no administration in time range)     Or   naloxone (NARCAN) injection 0.2 mg (has no administration in time range)     Or   naloxone (NARCAN) injection 0.4 mg (has no administration in time range)   vancomycin 1000 mg in 0.9% NaCl 250 mL intermittent infusion 1,000 mg (has no administration in time range)   morphine (PF) injection 4 mg (4 mg Intravenous Given 8/11/21 0933)   piperacillin-tazobactam (ZOSYN) 3.375 g vial to attach to  mL bag (0 g Intravenous Stopped 8/11/21 1037)   ketorolac (TORADOL) injection 15 mg (15 mg Intravenous Given 8/11/21 1034)   potassium chloride 10 mEq in 100 mL sterile water intermittent infusion (premix) (10 mEq Intravenous New Bag 8/11/21 1151)   0.9% sodium chloride BOLUS (1,000 mLs Intravenous New Bag 8/11/21 1151)       NEW PRESCRIPTIONS STARTED AT TODAY'S ER VISIT     Current Discharge Medication List             =================================================================    HPI     Patient information was obtained from: Patient    Use of : N/A         Anahy Aquino is a 41 year old female with a history of anxiety and anemia who presents by private vehicle for evaluation of worsening pain to left hand.    Patient reports she was bitten by her neighbor's dog to left middle finger yesterday. Dog is up to date on vaccinations. Patient was seen yesterday and received stiches and a 5 day course of Augmentin. She notes since yesterday pain has worsened and spread to left hand with increased swelling. Patient has been taking Advil and Tylenol without relief. Redness has spread down dorsal aspect of left hand. Pain is provoked by movement, specifically bending her finger. Associated dizziness and diaphoresis. No additional medical concerns or complaints at this time.     REVIEW OF SYSTEMS   Review of Systems   Constitutional: Positive for diaphoresis.   Musculoskeletal:        Positive for pain and swelling to left hand and fingers.   Skin: Positive  for color change (redness to left hand) and wound (dog bite to left middle finger with stitches in place).   Neurological: Positive for dizziness.   All other systems reviewed and are negative.     PAST MEDICAL HISTORY:     Past Medical History:   Diagnosis Date     Anemia      Anxiety      Asthma      Endometriosis      PCOS (polycystic ovarian syndrome)        PAST SURGICAL HISTORY:     Past Surgical History:   Procedure Laterality Date     ANKLE SURGERY       BRACHIAL PLEXUS EXPLORATION       LAPAROSCOPIC ENDOMETRIOSIS FULGURATION       MI HYSTEROSCOPY,W/ENDO BX N/A 11/30/2020    Procedure: HYSTEROSCOPY WITH DILATION AND CURETTAGE, CERVICAL POLYPECTOMY AND PLACEMENT OF MIRENA INTRAUTERINE DEVICE;  Surgeon: Jossy Rodriguez MD;  Location: [facility];  Service: Gynecology     TEAR DUCT SURGERY           CURRENT MEDICATIONS:   No current outpatient medications on file.       ALLERGIES:     Allergies   Allergen Reactions     Amoxicillin Other (See Comments)     Per patient, she took amoxicillin chronically and it is no longer effective     Letrozole Other (See Comments)     Asthma attack per pt     Vicodin [Hydrocodone-Acetaminophen] Rash       FAMILY HISTORY:   History reviewed. No pertinent family history.    SOCIAL HISTORY:     Social History     Socioeconomic History     Marital status:      Spouse name: Not on file     Number of children: Not on file     Years of education: Not on file     Highest education level: Not on file   Occupational History     Not on file   Tobacco Use     Smoking status: Never Smoker     Smokeless tobacco: Never Used   Substance and Sexual Activity     Alcohol use: Yes     Drug use: Never     Sexual activity: Not on file   Other Topics Concern     Not on file   Social History Narrative     Not on file     Social Determinants of Health     Financial Resource Strain:      Difficulty of Paying Living Expenses:    Food Insecurity:      Worried About Running Out of Food  in the Last Year:      Ran Out of Food in the Last Year:    Transportation Needs:      Lack of Transportation (Medical):      Lack of Transportation (Non-Medical):    Physical Activity:      Days of Exercise per Week:      Minutes of Exercise per Session:    Stress:      Feeling of Stress :    Social Connections:      Frequency of Communication with Friends and Family:      Frequency of Social Gatherings with Friends and Family:      Attends [employer] Services:      Active Member of Clubs or Organizations:      Attends Club or Organization Meetings:      Marital Status:    Intimate Partner Violence:      Fear of Current or Ex-Partner:      Emotionally Abused:      Physically Abused:      Sexually Abused:        VITALS:   BP (!) 162/85   Pulse 84   Temp 97.9  F (36.6  C) (Oral)   Resp 21   Wt 61.2 kg (135 lb)   LMP  (LMP Unknown)   SpO2 100%   BMI 22.47 kg/m      PHYSICAL EXAM     Physical Exam  Vitals and nursing note reviewed.   Constitutional:       Appearance: Normal appearance.   HENT:      Head: Normocephalic and atraumatic.   Musculoskeletal:         General: Swelling, tenderness, deformity and signs of injury present.   Skin:     General: Skin is warm.      Capillary Refill: Capillary refill takes less than 2 seconds.      Findings: Erythema present.   Neurological:      General: No focal deficit present.      Mental Status: She is alert and oriented to person, place, and time.         Physical Exam  Constitutional: Nontoxic, cooperative and pleasant, appears in discomfort. Here with young daughters.    Head: Atraumatic.     Nose: Nose normal.     Mouth/Throat: Oropharynx is clear and moist.     Eyes: EOM are normal. Pupils are equal, round, and reactive to light.     Ears: Bilateral pearly white TMs    Neck: Normal range of motion. Neck supple.     Cardiovascular: Normal rate, regular rhythm and normal heart sounds.      Pulmonary/Chest: Normal effort  and breath sounds normal.     Abdominal: Soft.  Bowel sounds are normal.    Musculoskeletal: Normal range of motion.  There is erythema on the dorsum of the hand on the palmar aspect mostly at the MCP.  The third finger is held in flexion.  There is sutures located laterally on the proximal third finger.  No dehiscence.  No purulence.    Neurological: No focal deficits    Lymphatics: No edema    Skin: Skin is warm and dry. Erythema over left hand.            Psychiatric: Normal mood and affect. Behavior is normal.       LAB:     All pertinent labs reviewed and interpreted.  Labs Ordered and Resulted from Time of ED Arrival Up to the Time of Departure from the ED   BASIC METABOLIC PANEL - Abnormal; Notable for the following components:       Result Value    Potassium 3.0 (*)     Calcium 8.2 (*)     All other components within normal limits   CRP INFLAMMATION - Abnormal; Notable for the following components:    CRP 2.4 (*)     All other components within normal limits   CBC WITH PLATELETS AND DIFFERENTIAL - Abnormal; Notable for the following components:    WBC Count 14.4 (*)     Absolute Neutrophils 11.3 (*)     Absolute Immature Granulocytes 0.1 (*)     All other components within normal limits   SARS-COV2 (COVID-19) VIRUS RT-PCR - Normal    Narrative:     Testing was performed using the lisette  SARS-CoV-2 & Influenza A/B Assay on the lisette  Leny  System.  This test should be ordered for the detection of SARS-COV-2 in individuals who meet SARS-CoV-2 clinical and/or epidemiological criteria. Test performance is unknown in asymptomatic patients.  This test is for in vitro diagnostic use under the FDA EUA for laboratories certified under CLIA to perform moderate and/or high complexity testing. This test has not been FDA cleared or approved.  A negative test does not rule out the presence of PCR inhibitors in the specimen or target RNA in concentration below the limit of detection for the assay. The possibility of a false negative should be considered if the patient's  recent exposure or clinical presentation suggests COVID-19.  [facility] are certified under the Clinical Laboratory Improvement Amendments of 1988 (CLIA-88) as qualified to perform moderate and/or high complexity laboratory testing.   MAGNESIUM - Normal   CBC WITH PLATELETS & DIFFERENTIAL    Narrative:     The following orders were created for panel order CBC with platelets + differential.  Procedure                               Abnormality         Status                     ---------                               -----------         ------                     CBC with platelets and d...[[id number]]  Abnormal            Final result                 Please view results for these tests on the individual orders.   CALL   CALL   CALL   COVID-19 VIRUS (CORONAVIRUS) BY PCR    Narrative:     The following orders were created for panel order Asymptomatic COVID-19 Virus (Coronavirus) by PCR Nasopharyngeal.  Procedure                               Abnormality         Status                     ---------                               -----------         ------                     SARS-COV2 (COVID-19) Vir...[[id number]]  Normal              Final result                 Please view results for these tests on the individual orders.        RADIOLOGY:     Reviewed all pertinent imaging. Please see official radiology report.  XR Hand Left G/E 3 Views   Final Result   IMPRESSION: Marginal negative ulnar variance. Soft tissue swelling over the dorsal aspect of the hand over the MCP joint row. No evidence for fracture or dislocation. No erosive changes are identified.              EKG:       I have independently reviewed and interpreted the EKG(s) documented above.      PROCEDURES:     Procedures      I, Mayte Randall, am serving as a scribe to document services personally performed by Dr. Matos based on my observation and the provider's statements to me. I, Nani Matos MD attest that Mayte Randall is acting  in a scribe capacity, has observed my performance of the services and has documented them in accordance with my direction.    Nani Matos M.D.  Emergency Medicine  [facility] EMERGENCY ROOM  [address] [address]  [phone]  Dept: [phone]     Nani Matos MD  08/11/21 142

## 2021-08-11 NOTE — PLAN OF CARE
Patient arrived to floor after returning to ER this AM with increasing s/sx of infection from dog bite. Oral ABX started yesterday. First dose zosyn given in ER. Plan to start IV Vanco, await lab work prior to initiation. Increased B/P, patient states r/t stress from pending divorce.     Alert and oriented, able to make needs known. NPO pending ortho consult. IV maintenance fluids running at 100mL/hr. Plan for continued IV ABX and pain management at this time.

## 2021-08-11 NOTE — ED TRIAGE NOTES
Patient has dog bite to left finger. She was seen in ED yesterday and had wound repair. Today presents with dizziness, pain, swelling, redness. Is taking antibiotics which were prescribed yesterday

## 2021-08-11 NOTE — H&P
[facility] MEDICINE ADMISSION HISTORY AND PHYSICAL     Assessment & Plan       Anahy Aquino is a 41 year old old female with history of anxiety, depression, asthma ,came with left middle finger laceration following dog bite on 8/10.  Laceration was repaired.  Started on Augmentin.  She returned with worsening pain, redness, swelling secondary to infection and possible tenosynovitis.  Started on IV Zosyn and admitted to the hospital.    Cellulitis of left hand  Possible tenosynovitis  Infected dog bite  Laceration of left middle finger, repaired  Lymphangitis towards left forearm  Zosyn 3.375 g every 8 hours  Vancomycin  Ice, elevation  Pain control with Tylenol 975 mg every 8 hours, oxycodone 5 to 10 mg every 4 hours as needed, IV Dilaudid 0.2 to 0.4 mg every 3 hours as needed  Dog was vaccinated  No history of MRSA  Had  gestational diabetes  Hand surgery consult    Moderate persistent asthma  Stable and asymptomatic  Albuterol inhaler as needed  Symbicort 2 puffs twice a day  Singulair 10 mg daily    Code full  DVT prophylaxis   Early ambulation and SCDs  No subcu Heparin as may need surgery  Inpatient admission  Needs to stay in the hospital at least for 2 days for further evaluation and treatment      Chief Complaint  worsening left hand swelling, redness spreading towards left forearm     HISTORY     MsAlexandra Aquino is a 41 year old old female with history of anxiety, depression, moderate persistent asthma, came with left middle finger laceration following dog bite on 8/10.  Laceration was repaired with sutures.  Started on Augmentin.  She returned with worsening left middle finger pain, redness, swelling.  She has more swelling, redness and some drainage around laceration.  She is not able to bend her left middle and fourth finger.  Redness spreading towards left palm and lower part of left forearm.  She is afebrile.  Has significant pain which is 10 out of 10 on the pain scale.   She had gestational diabetes.  No history of MRSA or persistent diabetes.  Denies headache, chest pain, breathing difficulty, palpitation, nausea, vomiting, abdominal pain or urinary symptoms.  Orthopedic hand surgery is notified from ED.  Started on IV antibiotic and admitted to the hospital.      Past Medical History     Past Medical History:  No date: Anemia  No date: Anxiety  No date: Asthma  No date: Endometriosis  No date: PCOS (polycystic ovarian syndrome)     Surgical History     Past Surgical History:   Procedure Laterality Date     ANKLE SURGERY       BRACHIAL PLEXUS EXPLORATION       LAPAROSCOPIC ENDOMETRIOSIS FULGURATION       [place] HYSTEROSCOPY,W/ENDO BX N/A 11/30/2020    Procedure: HYSTEROSCOPY WITH DILATION AND CURETTAGE, CERVICAL POLYPECTOMY AND PLACEMENT OF MIRENA INTRAUTERINE DEVICE;  Surgeon: Jossy Rodriguez MD;  Location: [facility];  Service: Gynecology     TEAR DUCT SURGERY       Family History    Reviewed. Positive family history of diabetes  Social History      Social History     Tobacco Use     Smoking status: Never Smoker     Smokeless tobacco: Never Used   Substance Use Topics     Alcohol use: Yes     Drug use: Never     Allergies     Allergies   Allergen Reactions     Amoxicillin Other (See Comments)     Per patient, she took amoxicillin chronically and it is no longer effective     Letrozole Other (See Comments)     Asthma attack per pt     Vicodin [Hydrocodone-Acetaminophen] Rash     Prior to Admission Medications      Prior to Admission Medications   Prescriptions Last Dose Informant Patient Reported? Taking?   DULoxetine (CYMBALTA) 30 MG capsule 8/11/2021 at am  Yes Yes   Sig: Take 60 mg by mouth daily    LORazepam (ATIVAN) 1 MG tablet   Yes Yes   Sig: Take 1 mg by mouth daily as needed for anxiety    SYMBICORT 160-4.5 mcg/actuation inhaler 8/11/2021 at am  Yes Yes   Sig: Inhale 2 puffs into the lungs 2 times daily    acetaminophen (TYLENOL) 500 MG tablet 8/11/2021 at 0130   Yes Yes   Sig: Take 1,000 mg by mouth every 6 hours as needed for mild pain   albuterol (PROAIR HFA;PROVENTIL HFA;VENTOLIN HFA) 90 mcg/actuation inhaler   Yes Yes   Sig: Inhale 2 puffs into the lungs every 6 hours as needed    amoxicillin-clavulanate (AUGMENTIN) 875-125 MG tablet 8/11/2021 at am  No Yes   Sig: Take 1 tablet by mouth 2 times daily for 5 days   ibuprofen (ADVIL/MOTRIN) 600 MG tablet 8/11/2021 at 0515  Yes Yes   Sig: Take 600 mg by mouth every 6 hours as needed for moderate pain   loratadine-pseudoePHEDrine (CLARITIN-D 24 HOUR)  MG 24 hr tablet 8/11/2021 at am  Yes Yes   Sig: Take 1 tablet by mouth daily   montelukast (SINGULAIR) 10 mg tablet 8/10/2021  Yes Yes   Sig: Take 10 mg by mouth At Bedtime    norethin-eth estradiol-fe (GILDESS 24 FE) 1-20 MG-MCG(24) tablet 8/11/2021 at am  Yes Yes   Sig: Take 1 tablet by mouth daily      Facility-Administered Medications: None      Review of Systems     A 12 point comprehensive review of systems was negative except as noted above in HPI.    PHYSICAL EXAMINATION       Vitals      Temp:  [97.8  F (36.6  C)-97.9  F (36.6  C)] 97.9  F (36.6  C)  Pulse:  [] 94  Resp:  [18-22] 18  BP: (131-150)/(76-93) 144/76  SpO2:  [94 %-100 %] 100 %    Examination     GENERAL:  Alert, appears comfortable, in no acute distress, appears stated age   HEAD:  Normocephalic, without obvious abnormality, atraumatic   EYES:  PERRL, conjunctiva  clear,  EOM's intact   NOSE: Nares normal,  mucosa normal, no drainage   THROAT: Lips, mucosa,  gums normal, mouth moist   NECK: Supple, symmetrical, trachea midline   BACK:   Symmetric, no curvature, ROM normal   LUNGS:   Clear to auscultation bilaterally, no rales, rhonchi, or wheezing, symmetric chest rise on inhalation, respirations unlabored   CHEST WALL:  No tenderness or deformity   HEART:  Regular rate and rhythm, S1 and S2 normal, no murmur, rub, or gallop    ABDOMEN:   Soft, non-tender, bowel sounds active , no masses, no  organomegaly, no rebound or guarding   EXTREMITIES: No BLE   SKIN: 1 cm laceration in left finger repaired with 4 sutures, extensive erythema, tenderness on left middle finger extending to left fourth finger extending to left palm with lymphangitic spreading towards the lower part of left forearm.  There  is swelling, some fluctuation and clear drainage  around laceration, left middle finger movement markedly restricted, had difficulty making fist secondary to pain   NEURO: Alert, oriented x 4, non-focal   PSYCH: Cooperative, behavior is appropriate      Pertinent Lab     Most Recent 3 CBC's:Recent Labs   Lab Test 08/11/21  1033   WBC 14.4*   HGB 12.5   MCV 85        Most Recent 3 BMP's:Recent Labs   Lab Test 08/11/21  0933      POTASSIUM 3.0*   CHLORIDE 106   CO2 23   BUN 10   CR 0.70   ANIONGAP 10   CHINYERE 8.2*   GLC 85         Pertinent Radiology     Radiology Results:   Recent Results (from the past 24 hour(s))   XR Hand Left G/E 3 Views    Narrative    EXAM: XR HAND LEFT G/E 3 VW  LOCATION: [facility]  DATE/TIME: 08/11/2021, 8:53 AM    INDICATION: Dog swelling redness.  COMPARISON: None.      Impression    IMPRESSION: Marginal negative ulnar variance. Soft tissue swelling over the dorsal aspect of the hand over the MCP joint row. No evidence for fracture or dislocation. No erosive changes are identified.           Kat Warren MD  Hospitalist  Hospital Medicine  [facility]   Phone: #[phone]

## 2021-08-11 NOTE — CONSULTS
ORTHOPEDIC CONSULTATION    Consultation  Anahy Aquino,  1980, MRN [id number]    [facility]  Cellulitis of hand, left [L03.114]    PCP: Sagar Bailey, [phone]   Code status:  Full Code       Extended Emergency Contact Information  Primary Emergency Contact: Diann Urena   [place]  Mobile Phone: [phone]  Relation: Mother         IMPRESSION:  Cellulitis of left hand     PLAN:  This patient was discussed with Dr. Han, on-call surgeon for [facility] and they are in agreement with the following plan.     - NPO  - MRI of left hand ordered. Unlikely flexor tendon tenosynovitis but could be the beginnings of a deep hand infection.   - Repeat labs ordered  - ID consulted  - Continue the IV abx    Thank you for including [facility] in the care of Anahy Aquino. It has been a pleasure participating in her care.        CHIEF COMPLAINT: <principal problem not specified>    HISTORY OF PRESENT ILLNESS:  The patient is seen in orthopedic consultation at the request of Dr. Warren.  The patient is a 41 year old female with severe pain of the left  hand. The patient reports that she was bitten by a neighbors dog. She was seen in the ED, the wound was sutured and she was sent with Augmentin. Overnight, the pain and swelling has worsened so she returned to the ED. She is unable to move her left middle finger and can hardly flex any of her fingers due to pain.  The patient describes their pain as severe and throbbing. They report that their pain is now radiating into her forearm as well. The patient reports that their pain is alleviated by elevation and is exacerbated by movement and pressure.      ALLERGIES:   Review of patient's allergies indicates   Allergies   Allergen Reactions     Amoxicillin Other (See Comments)     Per patient, she took amoxicillin chronically and it is no longer effective     Letrozole Other (See Comments)     Asthma attack per pt     Vicodin  [Hydrocodone-Acetaminophen] Rash         MEDICATIONS UPON ADMISSION:  Medications were reviewed.  They include:   Medications Prior to Admission   Medication Sig Dispense Refill Last Dose     acetaminophen (TYLENOL) 500 MG tablet Take 1,000 mg by mouth every 6 hours as needed for mild pain   8/11/2021 at 0130     albuterol (PROAIR HFA;PROVENTIL HFA;VENTOLIN HFA) 90 mcg/actuation inhaler Inhale 2 puffs into the lungs every 6 hours as needed         amoxicillin-clavulanate (AUGMENTIN) 875-125 MG tablet Take 1 tablet by mouth 2 times daily for 5 days 10 tablet 0 8/11/2021 at am     DULoxetine (CYMBALTA) 30 MG capsule Take 60 mg by mouth daily    8/11/2021 at am     ibuprofen (ADVIL/MOTRIN) 600 MG tablet Take 600 mg by mouth every 6 hours as needed for moderate pain   8/11/2021 at 0515     loratadine-pseudoePHEDrine (CLARITIN-D 24 HOUR)  MG 24 hr tablet Take 1 tablet by mouth daily   8/11/2021 at am     LORazepam (ATIVAN) 1 MG tablet Take 1 mg by mouth daily as needed for anxiety         montelukast (SINGULAIR) 10 mg tablet Take 10 mg by mouth At Bedtime    8/10/2021     norethin-eth estradiol-fe (GILDESS 24 FE) 1-20 MG-MCG(24) tablet Take 1 tablet by mouth daily   8/11/2021 at am     SYMBICORT 160-4.5 mcg/actuation inhaler Inhale 2 puffs into the lungs 2 times daily    8/11/2021 at am         SOCIAL HISTORY:   she  reports that she has never smoked. She has never used smokeless tobacco. She reports current alcohol use. She reports that she does not use drugs.      FAMILY HISTORY:  family history is not on file.      REVIEW OF SYSTEMS:   Reviewed with patient. See HPI, otherwise negative       PHYSICAL EXAMINATION:  Vitals: Temp:  [97.8  F (36.6  C)-98.4  F (36.9  C)] 98  F (36.7  C)  Pulse:  [] 90  Resp:  [18-22] 19  BP: (131-162)/(76-93) 143/88  SpO2:  [94 %-100 %] 100 %  General: On examination, the patient is in pain, awake and alert and oriented to person, place, time, and and general circumstances    Left hand: Swelling and redness diffusely throughout the dorsal hand. Incision with sutures on the ulnar side of the middle finger. Middle finger is red, swollen, and tender in a slightly flexed position. Unable to move this finger at all. Has significant pain with any touch or movement of this middle finger. She is able to oppose her thumb to all fingers except the middle finger. Lacks 5 inches from making a composite fist. Unable to fully extend her fingers.   Pulses:  radial pulse is intact and equal bilaterally  Sensation: Numbness to the left middle finger  Tenderness: Diffuse tenderness throughout the entire left hand and into the forearm.  Contralateral side= Full range of motion, Negative joint instability findings, 5/5 motor groups about the joint, Non-tender.       RADIOGRAPHIC EVALUATION:  Personally reviewed  EXAM: XR HAND LEFT G/E 3 VW  LOCATION: [facility]  DATE/TIME: 08/11/2021, 8:53 AM     INDICATION: Dog swelling redness.  COMPARISON: None.                                                                      IMPRESSION: Marginal negative ulnar variance. Soft tissue swelling over the dorsal aspect of the hand over the MCP joint row. No evidence for fracture or dislocation. No erosive changes are identified.    PERTINENT LABS:  Lab Results: personally reviewed.   Lab Results   Component Value Date    WBC 14.4 08/11/2021    WBC 7.0 05/05/2007    HGB 12.5 08/11/2021    HGB 13.2 05/05/2007    HCT 37.4 08/11/2021    HCT 37.1 05/05/2007    MCV 85 08/11/2021    MCV 86 05/05/2007     08/11/2021     05/05/2007         Ruth Mcrae PA-C, [facility]  Date: 8/11/2021  Time: 3:19 PM    CC1:   Kat Warren MD    CC2:   Sagar Bailey

## 2021-08-11 NOTE — PHARMACY-VANCOMYCIN DOSING SERVICE
"Pharmacy Vancomycin Initial Note  Date of Service 2021  Patient's  1980  41 year old, female    Indication: Skin and Soft Tissue Infection    Current estimated CrCl = Estimated Creatinine Clearance: 102.2 mL/min (based on SCr of 0.7 mg/dL).    Creatinine for last 3 days  2021:  9:33 AM Creatinine 0.70 mg/dL    Recent Vancomycin Level(s) for last 3 days  No results found for requested labs within last 72 hours.      Vancomycin IV Administrations (past 72 hours)      No vancomycin orders with administrations in past 72 hours.                Nephrotoxins and other renal medications (From now, onward)    Start     Dose/Rate Route Frequency Ordered Stop    21 1600  piperacillin-tazobactam (ZOSYN) 3.375 g vial to attach to  mL bag     Note to Pharmacy: For [facility], [facility] and [facility]: For Zosyn-naive patients, use the \"Zosyn initial dose + extended infusion\" order panel.    3.375 g  over 240 Minutes Intravenous EVERY 8 HOURS 21 1315      21 1400  vancomycin 1000 mg in 0.9% NaCl 250 mL intermittent infusion 1,000 mg      1,000 mg  over 60 Minutes Intravenous EVERY 12 HOURS 21 1344            Contrast Orders - past 72 hours (72h ago, onward)    None        Loading dose: N/A  Regimen: 1000 mg IV every 12 hours.  Start time: 13:45 on 2021  Exposure target: AUC24 (range)400-600 mg/L.hr   AUC24,ss: 496 mg/L.hr  Probability of AUC24 > 400: 72 %  Ctrough,ss: 14.6 mg/L  Probability of Ctrough,ss > 20: 25 %  Probability of nephrotoxicity (Lodise NOLA ): 10 %       Plan:  1. Start vancomycin  1000 mg IV q12h.   2. Vancomycin monitoring method: AUC  3. Vancomycin therapeutic monitoring goal: 400-600 mg*h/L  4. Pharmacy will check vancomycin levels as appropriate in 1-3 Days.    5. Serum creatinine levels will be ordered daily for the first week of therapy and at least twice weekly for subsequent weeks.      Taryn Rocha [facility]  "

## 2021-08-11 NOTE — CONSULTS
Care Management Initial Consult    General Information  Assessment completed with: Patient,    Type of CM/SW Visit: Initial Assessment    Primary Care Provider verified and updated as needed:     Readmission within the last 30 days:        Reason for Consult: discharge planning  Advance Care Planning:            Communication Assessment  Patient's communication style: spoken language (English or Bilingual)    Hearing Difficulty or Deaf: no   Wear Glasses or Blind: no    Cognitive  Cognitive/Neuro/Behavioral: WDL                      Living Environment:   People in home: child(chad), dependent     Current living Arrangements: house      Able to return to prior arrangements: yes       Family/Social Support:  Care provided by:    Provides care for: child(chad)  Marital Status: Single  Parent(s)          Description of Support System: Supportive         Current Resources:   Patient receiving home care services: No     Community Resources: None  Equipment currently used at home: none  Supplies currently used at home: None    Employment/Financial:  Employment Status:          Financial Concerns: No concerns identified           Lifestyle & Psychosocial Needs:  Social Determinants of Health     Tobacco Use: Low Risk      Smoking Tobacco Use: Never Smoker     Smokeless Tobacco Use: Never Used   Alcohol Use:      Frequency of Alcohol Consumption:      Average Number of Drinks:      Frequency of Binge Drinking:    Financial Resource Strain:      Difficulty of Paying Living Expenses:    Food Insecurity:      Worried About Running Out of Food in the Last Year:      Ran Out of Food in the Last Year:    Transportation Needs:      Lack of Transportation (Medical):      Lack of Transportation (Non-Medical):    Physical Activity:      Days of Exercise per Week:      Minutes of Exercise per Session:    Stress:      Feeling of Stress :    Social Connections:      Frequency of Communication with Friends and Family:      Frequency of  Social Gatherings with Friends and Family:      Attends Hindu Services:      Active Member of Clubs or Organizations:      Attends Club or Organization Meetings:      Marital Status:    Intimate Partner Violence:      Fear of Current or Ex-Partner:      Emotionally Abused:      Physically Abused:      Sexually Abused:    Depression:      PHQ-2 Score:    Housing Stability:      Unable to Pay for Housing in the Last Year:      Number of Places Lived in the Last Year:      Unstable Housing in the Last Year:        Functional Status:  Prior to admission patient needed assistance:   Dependent ADLs:: Independent  Dependent IADLs:: Independent  Assesssment of Functional Status: At functional baseline    Mental Health Status:          Chemical Dependency Status:                Values/Beliefs:  Spiritual, Cultural Beliefs, Hindu Practices, Values that affect care:                 Additional Information:  AIDET completed.  Pt lives with her 2 young girls in a private residence. She is independent with all ADL's, has no services and no DME used. Pt has good family and friend support to assist with dtrs while pt is hospitalized. Anticipate pt will DC back home without needs. Family will transport upon DC. Informed that CM will follow progression of care.   LUIS F Luna      Abbreviation Code:  [facility]), Home care (HC), Patient (Pt), Transitional Care Unit (TCU), Skilled Nursing Facility (SNF), Assisted Living (AL), Independent Living (IL), Physical Therapy (PT), Occupational Therapy (OT)        Ana Laura Romero RN

## 2021-08-11 NOTE — PHARMACY-ADMISSION MEDICATION HISTORY
Pharmacy Note - Admission Medication History    Pertinent Provider Information:      ______________________________________________________________________    Prior To Admission (PTA) med list completed and updated in EMR.       PTA Med List   Medication Sig Last Dose     acetaminophen (TYLENOL) 500 MG tablet Take 1,000 mg by mouth every 6 hours as needed for mild pain 8/11/2021 at 0130     albuterol (PROAIR HFA;PROVENTIL HFA;VENTOLIN HFA) 90 mcg/actuation inhaler Inhale 2 puffs into the lungs every 6 hours as needed       amoxicillin-clavulanate (AUGMENTIN) 875-125 MG tablet Take 1 tablet by mouth 2 times daily for 5 days 8/11/2021 at am     DULoxetine (CYMBALTA) 30 MG capsule Take 60 mg by mouth daily  8/11/2021 at am     ibuprofen (ADVIL/MOTRIN) 600 MG tablet Take 600 mg by mouth every 6 hours as needed for moderate pain 8/11/2021 at 0515     loratadine-pseudoePHEDrine (CLARITIN-D 24 HOUR)  MG 24 hr tablet Take 1 tablet by mouth daily 8/11/2021 at am     LORazepam (ATIVAN) 1 MG tablet Take 1 mg by mouth daily as needed for anxiety       montelukast (SINGULAIR) 10 mg tablet Take 10 mg by mouth At Bedtime  8/10/2021     norethin-eth estradiol-fe (GILDESS 24 FE) 1-20 MG-MCG(24) tablet Take 1 tablet by mouth daily 8/11/2021 at am     SYMBICORT 160-4.5 mcg/actuation inhaler Inhale 2 puffs into the lungs 2 times daily  8/11/2021 at am       Information source(s): Patient and CareEverywhere/SureScripts  Method of interview communication: in-person    Summary of Changes to PTA Med List  New: ibuprofen, Claritin-D, oral contraceptive, acetaminophen, Augmentin  Discontinued: Tylenol #3  Changed: none    Patient was asked about OTC/herbal products specifically.  PTA med list reflects this.    In the past week, patient estimated taking medication this percent of the time:  greater than 90%.    Allergies were reviewed, assessed, and updated with the patient.      Patient did not bring any medications to the hospital and  can't retrieve from home. No multi-dose medications are available for use during hospital stay.     The information provided in this note is only as accurate as the sources available at the time of the update(s).    Thank you for the opportunity to participate in the care of this patient.    Ely Soto [facility]  8/11/2021 9:04 AM

## 2021-08-12 LAB
ANION GAP SERPL CALCULATED.3IONS-SCNC: 3 MMOL/L (ref 5–18)
BUN SERPL-MCNC: 5 MG/DL (ref 8–22)
CALCIUM SERPL-MCNC: 7.4 MG/DL (ref 8.5–10.5)
CHLORIDE BLD-SCNC: 112 MMOL/L (ref 98–107)
CO2 SERPL-SCNC: 21 MMOL/L (ref 22–31)
CREAT SERPL-MCNC: 0.65 MG/DL (ref 0.6–1.1)
ERYTHROCYTE [DISTWIDTH] IN BLOOD BY AUTOMATED COUNT: 14 % (ref 10–15)
GFR SERPL CREATININE-BSD FRML MDRD: >90 ML/MIN/1.73M2
GLUCOSE BLD-MCNC: 96 MG/DL (ref 70–125)
HCG SERPL QL: NEGATIVE
HCT VFR BLD AUTO: 33.7 % (ref 35–47)
HGB BLD-MCNC: 11 G/DL (ref 11.7–15.7)
MCH RBC QN AUTO: 29 PG (ref 26.5–33)
MCHC RBC AUTO-ENTMCNC: 32.6 G/DL (ref 31.5–36.5)
MCV RBC AUTO: 89 FL (ref 78–100)
MRSA DNA SPEC QL NAA+PROBE: NEGATIVE
PLATELET # BLD AUTO: 310 10E3/UL (ref 150–450)
POTASSIUM BLD-SCNC: 4.1 MMOL/L (ref 3.5–5)
POTASSIUM BLD-SCNC: 4.1 MMOL/L (ref 3.5–5)
RBC # BLD AUTO: 3.79 10E6/UL (ref 3.8–5.2)
SA TARGET DNA: POSITIVE
SODIUM SERPL-SCNC: 136 MMOL/L (ref 136–145)
WBC # BLD AUTO: 6.6 10E3/UL (ref 4–11)

## 2021-08-12 PROCEDURE — 80048 BASIC METABOLIC PNL TOTAL CA: CPT | Performed by: FAMILY MEDICINE

## 2021-08-12 PROCEDURE — 250N000011 HC RX IP 250 OP 636: Performed by: FAMILY MEDICINE

## 2021-08-12 PROCEDURE — 87641 MR-STAPH DNA AMP PROBE: CPT | Performed by: INTERNAL MEDICINE

## 2021-08-12 PROCEDURE — 99232 SBSQ HOSP IP/OBS MODERATE 35: CPT | Performed by: FAMILY MEDICINE

## 2021-08-12 PROCEDURE — 250N000013 HC RX MED GY IP 250 OP 250 PS 637: Performed by: FAMILY MEDICINE

## 2021-08-12 PROCEDURE — 258N000003 HC RX IP 258 OP 636: Performed by: FAMILY MEDICINE

## 2021-08-12 PROCEDURE — 120N000001 HC R&B MED SURG/OB

## 2021-08-12 PROCEDURE — 87640 STAPH A DNA AMP PROBE: CPT | Performed by: INTERNAL MEDICINE

## 2021-08-12 PROCEDURE — 250N000013 HC RX MED GY IP 250 OP 250 PS 637: Performed by: INTERNAL MEDICINE

## 2021-08-12 PROCEDURE — 99221 1ST HOSP IP/OBS SF/LOW 40: CPT | Performed by: INTERNAL MEDICINE

## 2021-08-12 PROCEDURE — 85027 COMPLETE CBC AUTOMATED: CPT | Performed by: FAMILY MEDICINE

## 2021-08-12 PROCEDURE — 99207 PR CDG-MDM COMPONENT: MEETS MODERATE - DOWN CODED: CPT | Performed by: FAMILY MEDICINE

## 2021-08-12 PROCEDURE — 36415 COLL VENOUS BLD VENIPUNCTURE: CPT | Performed by: FAMILY MEDICINE

## 2021-08-12 RX ORDER — DIPHENHYDRAMINE HCL 25 MG
25 TABLET ORAL EVERY 6 HOURS PRN
Status: COMPLETED | OUTPATIENT
Start: 2021-08-12 | End: 2021-08-12

## 2021-08-12 RX ORDER — DIPHENHYDRAMINE HCL 25 MG
25 TABLET ORAL EVERY 6 HOURS PRN
Status: DISCONTINUED | OUTPATIENT
Start: 2021-08-12 | End: 2021-08-14 | Stop reason: HOSPADM

## 2021-08-12 RX ADMIN — DIPHENHYDRAMINE HCL 25 MG: 25 TABLET ORAL at 22:18

## 2021-08-12 RX ADMIN — OXYCODONE HYDROCHLORIDE 10 MG: 5 TABLET ORAL at 06:30

## 2021-08-12 RX ADMIN — VANCOMYCIN HYDROCHLORIDE 1000 MG: 5 INJECTION, POWDER, LYOPHILIZED, FOR SOLUTION INTRAVENOUS at 14:30

## 2021-08-12 RX ADMIN — PIPERACILLIN AND TAZOBACTAM 3.38 G: 3; .375 INJECTION, POWDER, LYOPHILIZED, FOR SOLUTION INTRAVENOUS at 07:59

## 2021-08-12 RX ADMIN — BUDESONIDE AND FORMOTEROL FUMARATE DIHYDRATE 2 PUFF: 160; 4.5 AEROSOL RESPIRATORY (INHALATION) at 08:07

## 2021-08-12 RX ADMIN — OXYCODONE HYDROCHLORIDE 10 MG: 5 TABLET ORAL at 01:49

## 2021-08-12 RX ADMIN — POTASSIUM CHLORIDE 40 MEQ: 1500 TABLET, EXTENDED RELEASE ORAL at 00:33

## 2021-08-12 RX ADMIN — DULOXETINE HYDROCHLORIDE 60 MG: 60 CAPSULE, DELAYED RELEASE PELLETS ORAL at 08:05

## 2021-08-12 RX ADMIN — ACETAMINOPHEN 975 MG: 325 TABLET ORAL at 14:30

## 2021-08-12 RX ADMIN — PIPERACILLIN AND TAZOBACTAM 3.38 G: 3; .375 INJECTION, POWDER, LYOPHILIZED, FOR SOLUTION INTRAVENOUS at 21:25

## 2021-08-12 RX ADMIN — DIPHENHYDRAMINE HCL 25 MG: 25 TABLET ORAL at 01:49

## 2021-08-12 RX ADMIN — DIPHENHYDRAMINE HCL 25 MG: 25 TABLET ORAL at 10:30

## 2021-08-12 RX ADMIN — PIPERACILLIN AND TAZOBACTAM 3.38 G: 3; .375 INJECTION, POWDER, LYOPHILIZED, FOR SOLUTION INTRAVENOUS at 00:46

## 2021-08-12 RX ADMIN — ACETAMINOPHEN 975 MG: 325 TABLET ORAL at 21:22

## 2021-08-12 RX ADMIN — BUDESONIDE AND FORMOTEROL FUMARATE DIHYDRATE 2 PUFF: 160; 4.5 AEROSOL RESPIRATORY (INHALATION) at 21:23

## 2021-08-12 RX ADMIN — MONTELUKAST 10 MG: 10 TABLET, FILM COATED ORAL at 21:24

## 2021-08-12 RX ADMIN — VANCOMYCIN HYDROCHLORIDE 1000 MG: 5 INJECTION, POWDER, LYOPHILIZED, FOR SOLUTION INTRAVENOUS at 04:53

## 2021-08-12 RX ADMIN — OXYCODONE HYDROCHLORIDE 5 MG: 5 TABLET ORAL at 16:38

## 2021-08-12 RX ADMIN — ACETAMINOPHEN 975 MG: 325 TABLET ORAL at 08:05

## 2021-08-12 NOTE — PLAN OF CARE
Problem: Adult Inpatient Plan of Care  Goal: Patient-Specific Goal (Individualized)  Outcome: Improving     Problem: Adult Inpatient Plan of Care  Goal: Optimal Comfort and Wellbeing  Outcome: Improving     Problem: Pain Acute  Goal: Acceptable Pain Control and Functional Ability  Outcome: Improving   Patient admitted to unit from ED with left hand pain/swelling. Patient is in MRI now. Plan to remain NPO until MRI results given to MD. PRN dilaudid and scheduled tylenol given for pain. Independent with ADLs. Will continue to monitor.

## 2021-08-12 NOTE — PROVIDER NOTIFICATION
Notified MD at 1:05 AM  regarding new orders for PRN benadryl for itching.      Spoke with: Dr. Cr.    Orders were obtained.    Cyn Blackwell RN  8/12/2021

## 2021-08-12 NOTE — PROGRESS NOTES
[facility] MEDICINE PROGRESS NOTE      Identification/Summary: Anahy Aquino is a 41 year old female with a past medical history of  anxiety, depression, asthma ,came with left middle finger laceration following dog bite on 8/10.  Laceration was repaired.  Started on Augmentin.  She returned with worsening pain, redness, swelling secondary to cellulitis and tenosynovitis.  Started on IV Zosyn and vancomycin.  Evaluated by hand surgery.  May need surgical intervention.    Assessment and Plan:  Cellulitis of left hand  Left middle finger flexor tenosynovitis  Infected dog bite  Laceration of left middle finger, repaired  Lymphangitis towards left forearm, improved  IV Zosyn and vancomycin  Ice and elevation  MRI--Moderate soft tissue edema over the dorsum of the hand, extending into the third finger, suggests cellulitis. No evidence of abscess. Minimally increased tenosynovial fluid in the flexor tendon sheath, with surrounding edema, that might correlate with symptoms of flexor tenosynovitis. There is no drainable tenosynovial fluid collection.  Appreciate hand surgery consult  Pain control with Tylenol 975 mg every 8 hours, oxycodone 5 to 10 mg every 4 hours as needed, IV Dilaudid 0.2 to 0.4 mg every 3 hours as needed  Dog was vaccinated  No history of MRSA  Had gestational diabetes. BG numbers are stable    Moderate persistent asthma  Stable and asymptomatic  Albuterol inhaler as needed  Symbicort 2 puffs twice a day  Singulair 10 mg daily     Code full  DVT prophylaxis   Early ambulation and SCDs  No subcu Heparin as may need surgery    Interval History/Subjective:  Resting comfortably.  Still has throbbing pain in left middle finger around incision.  Afebrile.  Left hand and forearm are swollen. Has itchy rashes on trunk for a while secondary to laundry detergent per patient.    Review of system  Rest of the review of system are negative except HPI.  Denies headache, chest pain,  breathing difficulty, palpitation, nausea, vomiting, abdominal pain or urinary symptoms    Physical Exam/Objective:  Temp:  [97.2  F (36.2  C)-98.4  F (36.9  C)] 97.2  F (36.2  C)  Pulse:  [] 88  Resp:  [16-21] 16  BP: (134-162)/(65-96) 140/65  SpO2:  [96 %-100 %] 99 %    Body mass index is 26.28 kg/m .    GENERAL:  Alert, appears comfortable, in no acute distress, appears stated age   HEAD:  Normocephalic, without obvious abnormality, atraumatic   EYES:  PERRL, conjunctiva clear,  EOM's intact   NOSE: Nares normal, mucosa normal, no drainage   THROAT: Lips, mucosa, gums normal, mouth moist   NECK: Supple, symmetrical, trachea midline   BACK:   Symmetric, no curvature, ROM normal   LUNGS:   Clear to auscultation bilaterally, no rales, rhonchi, or wheezing, symmetric chest rise on inhalation, respirations unlabored   CHEST WALL:  No tenderness or deformity   HEART:  Regular rate and rhythm, S1 and S2 normal, no murmur     ABDOMEN:   Soft, non-tender, bowel sounds active , no masses, no organomegaly    EXTREMITIES: 1 cm laceration in left finger repaired with sutures, extensive erythema, tenderness on left middle finger extending to left fourth finger, extending to left palm with lymphangitic spreading towards the lower part of left forearm-- improving. Fluctuation and clear drainage  around laceration-- almost resolved. Left middle finger movement markedly restricted, had difficulty making fist secondary to pain    SKIN: No rashes   NEURO: Alert, oriented x3   PSYCH: Cooperative, behavior is appropriate      Medications:   Personally Reviewed.  Medications     sodium chloride 100 mL/hr at 08/11/21 1800       acetaminophen  975 mg Oral TID     budesonide-formoterol  2 puff Inhalation BID     DULoxetine  60 mg Oral Daily     loratadine-pseudoePHEDrine  1 tablet Oral Daily     montelukast  10 mg Oral At Bedtime     norethin-eth estradiol-fe  1 tablet Oral Daily     piperacillin-tazobactam  3.375 g Intravenous Q8H      sodium chloride (PF)  3 mL Intracatheter Q8H     vancomycin  1,000 mg Intravenous Q12H       Data reviewed today: I personally reviewed all new medications, labs, imaging/diagnostics reports over the past 24 hours. Pertinent findings include:    Imaging:   Recent Results (from the past 24 hour(s))   MR Hand Left w/o Contrast    Narrative    EXAM: MR HAND LEFT W/O CONTRAST  LOCATION: [facility]  DATE/TIME: 8/11/2021 7:02 PM    INDICATION: Hand pain, swelling, infection suspected.  COMPARISON: Hand radiographs 8/11/2021.  TECHNIQUE: Unenhanced.    FINDINGS:     TENDONS:   -Extensor tendons: Normal without tear, tendinopathy, or tenosynovitis. Normally positioned.  -Flexor tendons: Minimally increased tenosynovial fluid in the flexor tendon sheath of the third finger with surrounding soft tissue edema, may represent flexor tenosynovitis. No drainable fluid.    LIGAMENTS:  -Visualized collateral and capsular ligaments: Normal.    JOINTS AND BONES:   -Bones negative for fracture, osteomyelitis, or reactive osteitis. Joints are normally aligned. No joint effusions..    MUSCLES AND SOFT TISSUES:   -No muscle atrophy or edema. No soft tissue mass or fluid collection. Moderate soft tissue edema diffusely over the dorsum of the hand, and in the third finger, suggesting cellulitis.      Impression    IMPRESSION:  1.  Moderate soft tissue edema over the dorsum of the hand, extending into the third finger, suggests cellulitis. No evidence of abscess.    2.  There is minimally increased tenosynovial fluid in the flexor tendon sheath, with surrounding edema, that might correlate with symptoms of flexor tenosynovitis. There is no drainable tenosynovial fluid collection.    3.  The extensor tendons are normal without tear, tendinopathy, or tenosynovial effusion.    4.  Normal bones and joints. No fracture, reactive osteitis, or osteomyelitis. No evidence of septic arthritis.       Labs:  Most Recent 3  CBC's:Recent Labs   Lab Test 08/12/21  0637 08/11/21  1549 08/11/21  1033   WBC 6.6 14.4* 14.4*   HGB 11.0* 12.1 12.5   MCV 89 85 85    392 398     Most Recent 3 BMP's:Recent Labs   Lab Test 08/12/21  0637 08/11/21  2151 08/11/21  1549 08/11/21  0933     --   --  139   POTASSIUM 4.1  4.1 3.4* 3.2* 3.0*   CHLORIDE 112*  --   --  106   CO2 21*  --   --  23   BUN 5*  --   --  10   CR 0.65  --   --  0.70   ANIONGAP 3*  --   --  10   CHINYERE 7.4*  --   --  8.2*   GLC 96  --   --  85       Kat Warren MD  [facility]  Phone: #[phone]

## 2021-08-12 NOTE — PLAN OF CARE
Problem: Adult Inpatient Plan of Care  Goal: Plan of Care Review  Outcome: Improving     Problem: Pain Acute  Goal: Acceptable Pain Control and Functional Ability  Outcome: Improving    Left hand pain is controlled with Oxycodone 10 mg prn, elevation and ice.  Redness and swelling persist.  Patient up with [facility].

## 2021-08-12 NOTE — PLAN OF CARE
Problem: Pain Acute  Goal: Acceptable Pain Control and Functional Ability  Outcome: No Change  Intervention: Develop Pain Management Plan  Recent Flowsheet Documentation  Taken 8/12/2021 0805 by Vivi Diana RN  Pain Management Interventions:    medication (see MAR)    cold applied     Problem: Electrolyte Imbalance  Goal: Electrolyte Balance  Outcome: Improving     Problem: Adult Inpatient Plan of Care  Goal: Patient-Specific Goal (Individualized)  Outcome: No Change  Flowsheets (Taken 8/12/2021 1452)  Individualized Care Needs: pain management  Anxieties, Fears or Concerns: wanting to avoid surgery        Potassium normalized.  Pain continues in left hand, utilizing scheduled tylenol, ice, and positioning at this time. PRN benadryl given for itchiness. IV maintenance fluids running at 100mL/hr. Continues on IV antibiotics. Nasal swab sent for MRSA. Followed by ID and ortho.

## 2021-08-12 NOTE — UTILIZATION REVIEW
Admission Status; Secondary Review Determination       Under the authority of the Utilization Management Committee, the utilization review process indicated a secondary review on the above patient. The review outcome is based on review of the medical records, discussions with staff, and applying clinical experience noted on the date of the review.     (x) Inpatient Status Appropriate - This patient's medical care is consistent with medical management for inpatient care and reasonable inpatient medical practice.     RATIONALE FOR DETERMINATION     Ms. Aquino is a 42 yo female pt who was seen in ED on 8/10/21 for left middle finger laceration d/t dog bit.  Laceration was repaired and she was discharged on po augmentin.  She returned to the ED with worsening pain, erythema and swelling.  There is clinical concern for tenosynovitis; she remains on IV zosyn q8hr   Ortho surgery consulted; MRI requested.  ID consult also requested today for concern for deep hand infection.      At the time of admission with the information available to the attending physician more than 2 nights Hospital complex care was anticipated, based on patient risk of adverse outcome if treated as outpatient and complex care required. Inpatient admission is appropriate based on the Medicare guidelines.       The information on this document is developed by the utilization review team in order for the business office to ensure compliance. This only denotes the appropriateness of proper admission status and does not reflect the quality of care rendered.   The definitions of Inpatient Status and Observation Status used in making the determination above are those provided in the CMS Coverage Manual, Chapter 1 and Chapter 6, section 70.4.         Sincerely,     Hortencia Rashid, DO  Utilization Review  Physician Advisor  [facility].

## 2021-08-12 NOTE — PROGRESS NOTES
"Orthopedic Progress Note      Assessment:    Cellulitis of left hand      Plan:   - Ice and elevation  - Weightbearing status: NWB   - No surgical indication at this time, okay to eat  - Continue with abx  - WBC has improved and MRI was equivocal which is encouraging at this time. Will continue to monitor for changes.    Subjective:  Pain: severe  Nausea, Vomiting:  No  Lightheadedness, Dizziness:  No  Neuro:  Patient denies new onset numbness or paresthesias    Patient is doing okay. States the hand pain is similar to yesterday but it has spread farther up her arm. She thinks the hand is less red today but warmer.     Objective:  BP (!) 140/65 (BP Location: Right arm)   Pulse 88   Temp 97.2  F (36.2  C) (Oral)   Resp 16   Ht 1.651 m (5' 5\")   Wt 71.6 kg (157 lb 14.4 oz)   LMP  (LMP Unknown)   SpO2 99%   BMI 26.28 kg/m    The patient is A&Ox3. Appears comfortable.     Left hand appears mild to moderately edematous, primarily dorsum of hand and middle finger. Dorsum of hand mildly erthematous. Tenderness to palpation diffusely throughout hand, worse middle finger P1. Tender medial aspect forearm and upper arm. Slight ability to flex and extend middle finger, but very limited, improved from yesterday somewhat. Sensation is intact. Radial pulse intact.    Pertinent Labs   Lab Results: personally reviewed.   No results found for: INR, PROTIME  Lab Results   Component Value Date    WBC 6.6 08/12/2021    HGB 11.0 (L) 08/12/2021    HCT 33.7 (L) 08/12/2021    MCV 89 08/12/2021     08/12/2021     Lab Results   Component Value Date     08/12/2021    CO2 21 (L) 08/12/2021         Report completed by:  Ruth Mcrae PA-C, [facility]OMEO  Date: 8/12/2021  Time: 3:06 PM    "

## 2021-08-12 NOTE — CONSULTS
[facility]  General ID Service Consult      Patient: Anahy Aquino  YOB: 1980, MRN: [id number]  Date of Admission:  8/11/2021  Date of Consult: 08/12/2021  Consult Requested by: Kat Warren MD  Admission Diagnosis: Cellulitis of hand, left [L03.114]      ID Assessment & Plan   Hand cellulitis, lymphangitis, leukocytosis  Post dog bite  outp augmentin  Flexor Tenosynovitis, early  covid19 vaccinated    Plan  MRSa screen  Vancomycin Zosyn for now-started yesterday  Assess for response, potential debridement  Redness resolved      Malu Modi M.D.      Malu Modi MD  [facility]  ______________________________________________________________________    Chief Complaint   Dog bite    History of Present Illness    41 year old old female with history of anxiety, depression, asthma, admitted with hx of dog bite to left hand finger.  She was in the emergency room got a stitched and course of Augmentin prescribed.  However she had to come back for nonresolution of symptoms.  She was found to have also lymphangitis up the forearm.  She was started on vancomycin and Zosyn and the redness is better.  MRI was done  She was evaluated by hand surgery  Otherwise most recent infection with 2 weeks ago with bronchitis and it was prescribed some antibiotics.    Dog is the neighbors dog and is up-to-date on shots per patient  Patient received tetanus toxoid in the emergency room    RAD  1.  Moderate soft tissue edema over the dorsum of the hand, extending into the third finger, suggests cellulitis. No evidence of abscess.     2.  There is minimally increased tenosynovial fluid in the flexor tendon sheath, with surrounding edema, that might correlate with symptoms of flexor tenosynovitis. There is no drainable tenosynovial fluid collection.     3.  The extensor tendons are normal without tear, tendinopathy, or tenosynovial effusion.     4.  Normal bones  and joints. No fracture, reactive osteitis, or osteomyelitis. No evidence of septic arthritis.      Review of Systems   The 10 point Review of Systems is negative other than noted in the HPI or here.     Past Medical History    Past Medical History:   Diagnosis Date     Anemia      Anxiety      Asthma      Endometriosis      PCOS (polycystic ovarian syndrome)        Past Surgical History   Past Surgical History:   Procedure Laterality Date     ANKLE SURGERY       BRACHIAL PLEXUS EXPLORATION       LAPAROSCOPIC ENDOMETRIOSIS FULGURATION       AK HYSTEROSCOPY,W/ENDO BX N/A 11/30/2020    Procedure: HYSTEROSCOPY WITH DILATION AND CURETTAGE, CERVICAL POLYPECTOMY AND PLACEMENT OF MIRENA INTRAUTERINE DEVICE;  Surgeon: Jossy Rodriguez MD;  Location: [facility];  Service: Gynecology     TEAR DUCT SURGERY         Social History   Social History     Tobacco Use     Smoking status: Never Smoker     Smokeless tobacco: Never Used   Substance Use Topics     Alcohol use: Yes     Drug use: Never       Family History   Negative MRSA    Medications   I have reviewed this patient's current medications    Allergies   Allergies   Allergen Reactions     Amoxicillin Other (See Comments)     Per patient, she took amoxicillin chronically and it is no longer effective     Letrozole Other (See Comments)     Asthma attack per pt     Vicodin [Hydrocodone-Acetaminophen] Hives       Physical Exam   Vital Signs: Temp: 97.2  F (36.2  C) Temp src: Oral BP: (!) 140/65 Pulse: 88   Resp: 16 SpO2: 99 % O2 Device: None (Room air)    Weight: 157 lbs 14.4 oz  Gen. appearance nontoxic  Eyes no conjunctivitis or icterus  Neck no stiffness or neck vein distention, no LN  Heart  No edema  Lungs breathing comfortably  Abdomen soft not tender  Extremities no synovitis, trace edema-hand middle finger stitches no pus; edema dorsum hand, some tenderness ; unable to make a fist;   Skin  no rash or emboli  Neurologic alert oriented no focal  deficits      Data   Inflammatory Markers   Recent Labs   Lab Test 08/11/21  0850   CRP 2.4*        Hematology Studies   Recent Labs   Lab Test 08/12/21  0637 08/11/21  1549 08/11/21  1033   WBC 6.6 14.4* 14.4*   HGB 11.0* 12.1 12.5   MCV 89 85 85    392 398       Metabolic Studies   Recent Labs   Lab Test 08/12/21  0637 08/11/21  2151 08/11/21  1549 08/11/21  0933     --   --  139   POTASSIUM 4.1  4.1 3.4* 3.2* 3.0*   CHLORIDE 112*  --   --  106   CO2 21*  --   --  23   BUN 5*  --   --  10   CR 0.65  --   --  0.70   GFRESTIMATED >90  --   --  >90       Hepatic Studies  No lab results found.    Most Recent 6 Bacteria Isolates From Any Culture (See EPIC Reports for Culture Details):No lab results found.    Urine Studies  No lab results found.    Vancomycin Levels  No lab results found.    Invalid input(s): VANCO    Hepatitis B Testing No lab results found.  Hepatitis C Testing   No results found for: HCVAB, HQTG, HCGENO, HCPCR, HQTRNA, HEPRNA  HIVTesting No lab results found.    Respiratory Virus Testing    No results found for: RS, FLUAG  COVID-19 Antibody Results, Testing for Immunity    COVID-19 Antibody Results, Testing for Immunity   No data to display.         COVID-19 PCR Results    COVID-19 PCR Results 6/24/20 8/2/20 8/11/21   COVID-19 Virus by PCR (External Result) Undetected Undetected    SARS CoV2 PCR   Negative      Comments are available for some flowsheets but are not being displayed.

## 2021-08-13 ENCOUNTER — APPOINTMENT (OUTPATIENT)
Dept: ULTRASOUND IMAGING | Facility: CLINIC | Age: 41
End: 2021-08-13
Attending: FAMILY MEDICINE
Payer: COMMERCIAL

## 2021-08-13 LAB
HOLD SPECIMEN: NORMAL
POTASSIUM BLD-SCNC: 3.4 MMOL/L (ref 3.5–5)
VANCOMYCIN SERPL-MCNC: 12.1 MG/L

## 2021-08-13 PROCEDURE — 120N000001 HC R&B MED SURG/OB

## 2021-08-13 PROCEDURE — 80202 ASSAY OF VANCOMYCIN: CPT | Performed by: FAMILY MEDICINE

## 2021-08-13 PROCEDURE — 36415 COLL VENOUS BLD VENIPUNCTURE: CPT | Performed by: FAMILY MEDICINE

## 2021-08-13 PROCEDURE — 250N000011 HC RX IP 250 OP 636: Performed by: FAMILY MEDICINE

## 2021-08-13 PROCEDURE — 250N000013 HC RX MED GY IP 250 OP 250 PS 637: Performed by: FAMILY MEDICINE

## 2021-08-13 PROCEDURE — 99232 SBSQ HOSP IP/OBS MODERATE 35: CPT | Performed by: INTERNAL MEDICINE

## 2021-08-13 PROCEDURE — 99207 PR CDG-MDM COMPONENT: MEETS MODERATE - DOWN CODED: CPT | Performed by: FAMILY MEDICINE

## 2021-08-13 PROCEDURE — 84132 ASSAY OF SERUM POTASSIUM: CPT | Performed by: FAMILY MEDICINE

## 2021-08-13 PROCEDURE — 93971 EXTREMITY STUDY: CPT | Mod: LT

## 2021-08-13 PROCEDURE — 99232 SBSQ HOSP IP/OBS MODERATE 35: CPT | Performed by: FAMILY MEDICINE

## 2021-08-13 PROCEDURE — 258N000003 HC RX IP 258 OP 636: Performed by: FAMILY MEDICINE

## 2021-08-13 RX ORDER — POTASSIUM CHLORIDE 1500 MG/1
40 TABLET, EXTENDED RELEASE ORAL ONCE
Status: DISCONTINUED | OUTPATIENT
Start: 2021-08-13 | End: 2021-08-13

## 2021-08-13 RX ORDER — POTASSIUM CHLORIDE 1500 MG/1
40 TABLET, EXTENDED RELEASE ORAL ONCE
Status: COMPLETED | OUTPATIENT
Start: 2021-08-13 | End: 2021-08-13

## 2021-08-13 RX ORDER — IBUPROFEN 600 MG/1
600 TABLET, FILM COATED ORAL EVERY 6 HOURS PRN
Status: DISCONTINUED | OUTPATIENT
Start: 2021-08-13 | End: 2021-08-14 | Stop reason: HOSPADM

## 2021-08-13 RX ADMIN — PIPERACILLIN AND TAZOBACTAM 3.38 G: 3; .375 INJECTION, POWDER, LYOPHILIZED, FOR SOLUTION INTRAVENOUS at 13:09

## 2021-08-13 RX ADMIN — VANCOMYCIN HYDROCHLORIDE 1000 MG: 5 INJECTION, POWDER, LYOPHILIZED, FOR SOLUTION INTRAVENOUS at 02:28

## 2021-08-13 RX ADMIN — LORATADINE AND PSEUDOEPHEDRINE 1 TABLET: 10; 240 TABLET, EXTENDED RELEASE ORAL at 09:13

## 2021-08-13 RX ADMIN — ACETAMINOPHEN 975 MG: 325 TABLET ORAL at 09:13

## 2021-08-13 RX ADMIN — BUDESONIDE AND FORMOTEROL FUMARATE DIHYDRATE 2 PUFF: 160; 4.5 AEROSOL RESPIRATORY (INHALATION) at 20:46

## 2021-08-13 RX ADMIN — POTASSIUM CHLORIDE 40 MEQ: 1500 TABLET, EXTENDED RELEASE ORAL at 13:09

## 2021-08-13 RX ADMIN — ACETAMINOPHEN 975 MG: 325 TABLET ORAL at 14:57

## 2021-08-13 RX ADMIN — BUDESONIDE AND FORMOTEROL FUMARATE DIHYDRATE 2 PUFF: 160; 4.5 AEROSOL RESPIRATORY (INHALATION) at 09:14

## 2021-08-13 RX ADMIN — OXYCODONE HYDROCHLORIDE 5 MG: 5 TABLET ORAL at 13:09

## 2021-08-13 RX ADMIN — MONTELUKAST 10 MG: 10 TABLET, FILM COATED ORAL at 21:45

## 2021-08-13 RX ADMIN — PIPERACILLIN AND TAZOBACTAM 3.38 G: 3; .375 INJECTION, POWDER, LYOPHILIZED, FOR SOLUTION INTRAVENOUS at 20:46

## 2021-08-13 RX ADMIN — DULOXETINE HYDROCHLORIDE 60 MG: 60 CAPSULE, DELAYED RELEASE PELLETS ORAL at 09:13

## 2021-08-13 RX ADMIN — DIPHENHYDRAMINE HCL 25 MG: 25 TABLET ORAL at 14:57

## 2021-08-13 RX ADMIN — OXYCODONE HYDROCHLORIDE 5 MG: 5 TABLET ORAL at 00:53

## 2021-08-13 RX ADMIN — PIPERACILLIN AND TAZOBACTAM 3.38 G: 3; .375 INJECTION, POWDER, LYOPHILIZED, FOR SOLUTION INTRAVENOUS at 04:30

## 2021-08-13 RX ADMIN — ACETAMINOPHEN 975 MG: 325 TABLET ORAL at 20:45

## 2021-08-13 ASSESSMENT — MIFFLIN-ST. JEOR: SCORE: 1396.17

## 2021-08-13 NOTE — PHARMACY-VANCOMYCIN DOSING SERVICE
"Pharmacy Vancomycin Note  Date of Service 2021  Patient's  1980   41 year old, female    Indication: Skin and Soft Tissue Infection  Day of Therapy: 3  Current vancomycin regimen:  1000 mg IV q12h  Current vancomycin monitoring method: AUC  Current vancomycin therapeutic monitoring goal: 400-600 mg*h/L    Current estimated CrCl = Estimated Creatinine Clearance: 114 mL/min (based on SCr of 0.65 mg/dL).    Creatinine for last 3 days  2021:  9:33 AM Creatinine 0.70 mg/dL  2021:  6:37 AM Creatinine 0.65 mg/dL    Recent Vancomycin Levels (past 3 days)  2021:  8:08 AM Vancomycin 12.1 mg/L    Vancomycin IV Administrations (past 72 hours)                   vancomycin 1000 mg in 0.9% NaCl 250 mL intermittent infusion 1,000 mg (mg) 1,000 mg New Bag 21 0228      Restarted 21 1644     1,000 mg New Bag  1430     1,000 mg New Bag  0453     1,000 mg New Bag 21 1600                Nephrotoxins and other renal medications (From now, onward)    Start     Dose/Rate Route Frequency Ordered Stop    21 1400  vancomycin 1250 mg in 0.9% NaCl 250 mL intermittent infusion 1,250 mg      1,250 mg  over 90 Minutes Intravenous EVERY 12 HOURS 21 1027      21 1600  piperacillin-tazobactam (ZOSYN) 3.375 g vial to attach to  mL bag     Note to Pharmacy: For [facility], [facility] and [facility]: For Zosyn-naive patients, use the \"Zosyn initial dose + extended infusion\" order panel.    3.375 g  over 240 Minutes Intravenous EVERY 8 HOURS 21 1315               Contrast Orders - past 72 hours (72h ago, onward)    None          Interpretation of levels and current regimen:  Vancomycin level is reflective of -600    Has serum creatinine changed greater than 50% in last 72 hours: No    Urine output:  good urine output    Renal Function: Stable    Loading dose: N/A  Regimen: 1250 mg IV every 12 hours.  Start time: 14:28 on 2021  Exposure target: AUC24 (range)400-600 mg/L.hr "   AUC24,ss: 502 mg/L.hr  Probability of AUC24 > 400: 84 %  Ctrough,ss: 14 mg/L  Probability of Ctrough,ss > 20: 18 %  Probability of nephrotoxicity (Lodise NOLA 2009): 9 %    Plan:  1. Increase Dose to 1250 mg Q12H  2. Vancomycin monitoring method: AUC  3. Vancomycin therapeutic monitoring goal: 400-600 mg*h/L  4. Pharmacy will check vancomycin levels as appropriate in 1-3 Days.  5. Serum creatinine levels will be ordered daily for the first week of therapy and at least twice weekly for subsequent weeks.    Taryn Rocha, [facility]

## 2021-08-13 NOTE — PROGRESS NOTES
[facility]  Infectious Disease  Progress Note     Date of Admission:  8/11/2021    Assessment & Plan   Hand cellulitis, lymphangitis, leukocytosis  Post dog bite  outp augmentin  Flexor Tenosynovitis, early  covid19 vaccinated     Plan   On vanco zosyn  MRSa screen neg, but pos MSSA  Discontinue vancomycin  Home on po Augmentin tomorrow x 10 days  followup primary    Will sign off, please call with questions    Malu Modi M.D.    ______________________________________________________________________    Interval History   Better  Less pain swelling    Physical Exam   Vital Signs: Temp: 98.7  F (37.1  C) Temp src: Oral BP: 135/83 Pulse: 102   Resp: 16 SpO2: 100 % O2 Device: None (Room air)    Weight: 161 lbs 0 oz  Gen. appearance nontoxic  Eyes no conjunctivitis or icterus  Neck no stiffness or neck vein distention, no LN  Heart  no S3 or murmurs  Heart  No edema  Lungs breathing comfortably    Extremities hand swelling much better  No redness better ROM all joints no pus  Skin  no rash or emboli  Neurologic alert oriented no focal deficits      Data   Results for orders placed or performed during the hospital encounter of 08/11/21 (from the past 24 hour(s))   MRSA MSSA PCR, Nasal Swab    Specimen: Nares, Bilateral; Swab   Result Value Ref Range    MRSA Target DNA Negative Negative    SA Target DNA Positive (A) Negative    Narrative    The CodeEval  Xpert SA Nasal Complete assay performed in the [employer]  Dx System is a qualitative in vitro diagnostic test designed for rapid detection of Staphylococcus aureus (SA) and methicillin-resistant Staphylococcus aureus (MRSA) from nasal swabs in patients at risk for nasal colonization. The test utilizes automated real-time polymerase chain reaction (PCR) to detect MRSA/SA DNA. The Xpert SA Nasal Complete assay is intended to aid in the prevention and control of MRSA/SA infections in healthcare settings. The assay is not intended to diagnose,  guide or monitor treatment for MRSA/SA infections, or provide results of susceptibility to methicillin. A negative result does not preclude MRSA/SA nasal colonization.    Potassium   Result Value Ref Range    Potassium 3.4 (L) 3.5 - 5.0 mmol/L   Extra Tube ([place] Draw)    Narrative    The following orders were created for panel order Extra Tube ([place] Draw).  Procedure                               Abnormality         Status                     ---------                               -----------         ------                     Extra Purple Top Tube[[id number]]                            Final result                 Please view results for these tests on the individual orders.   Extra Purple Top Tube   Result Value Ref Range    Hold Specimen [facility]    Vancomycin level   Result Value Ref Range    Vancomycin 12.1   mg/L   US Upper Extremity Venous Duplex Left    Narrative    EXAM: US UPPER EXTREMITY VENOUS DUPLEX LEFT  LOCATION: [facility]  DATE/TIME: 8/13/2021 11:23 AM    INDICATION: Left hand swelling. Dog bite.  COMPARISON: None.  TECHNIQUE: Venous Duplex ultrasound of the left upper extremity with (when possible) and without compression, augmentation, and duplex. Color flow and spectral Doppler with waveform analysis performed.    FINDINGS: Ultrasound includes evaluation of the internal jugular vein, innominate vein, subclavian vein, axillary vein, and brachial vein. The superficial cephalic and basilic veins were also evaluated where seen.     LEFT: No deep venous thrombosis. No superficial thrombophlebitis.       Impression    IMPRESSION:   1.  No deep venous thrombosis in the left upper extremity.

## 2021-08-13 NOTE — PLAN OF CARE
Problem: Pain Acute  Goal: Acceptable Pain Control and Functional Ability  Outcome: Improving  Intervention: Develop Pain Management Plan  Recent Flowsheet Documentation  Taken 8/12/2021 2122 by Swetha Casey RN  Pain Management Interventions:   medication (see MAR)   cold applied  Taken 8/12/2021 1638 by Swetha Casey RN  Pain Management Interventions:   medication (see MAR)   cold applied   care clustered    Pain has been well controlled with scheduled tylenol and ice pack.   She did need a 5mg dose of oxycodone for breakthrough pain this afternoon.  She slept about 2 hours ago.     Problem: Impaired Wound Healing  Goal: Optimal Wound Healing  Outcome: Improving  Intervention: Promote Wound Healing  Recent Flowsheet Documentation  Taken 8/12/2021 2122 by Swetha Casey RN  Pain Management Interventions:   medication (see MAR)   cold applied  Taken 8/12/2021 1638 by Swetha Casey RN  Pain Management Interventions:   medication (see MAR)   cold applied   care clustered  Activity Management: ambulated to bathroom

## 2021-08-13 NOTE — PLAN OF CARE
Problem: Adult Inpatient Plan of Care  Goal: Absence of Hospital-Acquired Illness or Injury  Intervention: Identify and Manage Fall Risk  Recent Flowsheet Documentation  Taken 8/13/2021 0053 by Kayy Camacho RN  Safety Promotion/Fall Prevention:   increased rounding and observation   increase visualization of patient   lighting adjusted   nonskid shoes/slippers when out of bed   patient and family education   safety round/check completed   room organization consistent     Problem: Pain Acute  Goal: Acceptable Pain Control and Functional Ability  Outcome: No Change  Intervention: Develop Pain Management Plan  Patient pain controlled with scheduled tylenol, minimal oxycodone use and ice     Problem: Impaired Wound Healing  Goal: Optimal Wound Healing  Outcome: No Change  Intervention: Promote Wound Healing  Patient hand pink and swollen. Patient feels that edema and erythema is advancing past borders, especially feels the areas of discomfort are expanding. Erythema appears localized to my assessment.     Problem: Adult Inpatient Plan of Care  Goal: Readiness for Transition of Care  Outcome: No Change  Continues on IV vancomycin and rocephin. Nasal swab positive for staph, negative for MRSA. MRI resulted, patient will discuss results with orthopedic surgeon today, is NPO for possible procedure.

## 2021-08-13 NOTE — PROVIDER NOTIFICATION
Notified Dr. Warren that PIV infiltrated during Vanco infusion.  Infusion was stopped, IV removed and ice applied to sight and that new IV will be started.

## 2021-08-13 NOTE — PROGRESS NOTES
"Orthopedic Progress Note      Assessment:    Left hand cellulitis, dog bite    Plan:   Patient continues to improve with IV antibiotics. Was seen by Dr. Han today, okay to discharge on oral antibiotics when cleared by ID. Followup as needed with orthopedics. No further orthopedic intervention needed, ortho will sign off.        Subjective:  Pain: moderate  Nausea, Vomiting:  No  Lightheadedness, Dizziness:  No    Patient reports she is doing better. She notes improved but continued pain in hand, radiating some throughout forearm and upper arm. She states swelling has improved somewhat, still feels hand is warm. ROM improving. Notes mild tingling up arm.     Objective:  /83 (BP Location: Right arm)   Pulse 102   Temp 98.7  F (37.1  C) (Oral)   Resp 16   Ht 1.651 m (5' 5\")   Wt 73 kg (161 lb)   LMP  (LMP Unknown)   SpO2 100%   BMI 26.79 kg/m    The patient is A&Ox3. Appears comfortable.     Left hand appears mildly edematous, primarily dorsum of hand and middle finger, improved from yesterday. No erythema. Tenderness to palpation middle finger.  Mild Ttenderness medial aspect forearm and upper arm. Middle finger able to demonstrate moderate flexion and able to extend to 0 degrees, improved from yesterday. Sensation is intact. Radial pulse intact.    Pertinent Labs   Lab Results: personally reviewed.   No results found for: INR, PROTIME  Lab Results   Component Value Date    WBC 6.6 08/12/2021    HGB 11.0 (L) 08/12/2021    HCT 33.7 (L) 08/12/2021    MCV 89 08/12/2021     08/12/2021     Lab Results   Component Value Date     08/12/2021    CO2 21 (L) 08/12/2021         Report completed by:  Ruth Mcrae PA-C, [facility]  Date: 8/13/2021  Time: 11:49 AM    "

## 2021-08-13 NOTE — PROVIDER NOTIFICATION
Call to ID re: Dr Modi had mentioned to pt that he was going to discontinue the Vancomycin IV and possibly order Cefazolin. In Dr Modi notes it does say discontinue vanco but no mention of other antibiotic orders.     Spoke with Dr. Roberts with ID and per Dr Roberts discontinue Vanco per Dr Modi note and continue with scheduled Zosyn.

## 2021-08-13 NOTE — PROGRESS NOTES
[facility] MEDICINE PROGRESS NOTE      Identification/Summary: Anahy Aquino is a 41 year old female with a past medical history of  anxiety, depression, asthma ,came with left middle finger laceration following dog bite on 8/10.  Laceration was repaired.  Started on Augmentin.  She returned with worsening pain, redness, swelling secondary to cellulitis and tenosynovitis.    Minimally increased tenosynovial fluid in the flexor tendon sheath with surrounding erythema, no drainable effusion. Started on IV Zosyn and vancomycin.  Evaluated by hand surgery and ID.  Swelling, redness or improved but still has local warmth.  No need for surgical intervention.  Left upper extremity ultrasound to rule out DVT.      Assessment and Plan:  Cellulitis of left hand  Left middle finger flexor tenosynovitis minimal fluid  SIRS, resolving  Infected dog bite  Laceration of left middle finger, repaired  Lymphangitis towards left forearm, improved  IV Zosyn and vancomycin  Ice and elevation  MRI--Moderate soft tissue edema over the dorsum of the hand, extending into the third finger, suggests cellulitis. No evidence of abscess. Minimally increased tenosynovial fluid in the flexor tendon sheath, with surrounding edema, that might correlate with symptoms of flexor tenosynovitis. There is no drainable tenosynovial fluid collection.  Appreciate hand surgery consult  No need for surgery  Pain control with Tylenol 975 mg every 8 hours, oxycodone 5 to 10 mg every 4 hours as needed, IV Dilaudid 0.2 to 0.4 mg every 3 hours as needed  Dog was vaccinated  No history of MRSA  Right upper extremity ultrasound to rule out DVT  Had gestational diabetes. BG numbers are stable    Moderate persistent asthma  Stable and asymptomatic  Albuterol inhaler as needed  Symbicort 2 puffs twice a day  Singulair 10 mg daily    Hypokalemia, potassium replacement     Code full  DVT prophylaxis   Early ambulation and SCDs    Barrier to  discharge  Continue IV antibiotic.  Anticipated discharge in 1 day    Interval History/Subjective:  Resting comfortably. Swelling, redness, pain are improving.  Still has local rise of temperature on the dorsal surface of left hand and forearm.  Febrile.  Left third finger movement restricted due to pain    Review of system  Rest of the review of system are negative except HPI.  Denies headache, chest pain, breathing difficulty, palpitation, nausea, vomiting, abdominal pain or urinary symptoms    Physical Exam/Objective:  Temp:  [98.1  F (36.7  C)-98.7  F (37.1  C)] 98.7  F (37.1  C)  Pulse:  [] 102  Resp:  [16] 16  BP: (128-138)/(64-83) 135/83  SpO2:  [97 %-100 %] 100 %    Body mass index is 26.79 kg/m .    GENERAL:  Alert, appears comfortable, in no acute distress, appears stated age   HEAD:  Normocephalic, without obvious abnormality, atraumatic   EYES:  PERRL, conjunctiva clear,  EOM's intact   NOSE: Nares normal, mucosa normal, no drainage   THROAT: Lips, mucosa, gums normal, mouth moist   NECK: Supple, symmetrical, trachea midline   BACK:   Symmetric, no curvature, ROM normal   LUNGS:   Clear to auscultation bilaterally, no rales, rhonchi, or wheezing, symmetric chest rise on inhalation, respirations unlabored   CHEST WALL:  No tenderness or deformity   HEART:  Regular rate and rhythm, S1 and S2 normal, no murmur     ABDOMEN:   Soft, non-tender, bowel sounds active , no masses, no organomegaly    EXTREMITIES: 1 cm laceration in left middle finger, repaired with sutures, extensive erythema, tenderness on left middle finger extending to left fourth finger, extending to left palm with lymphangitic spreading towards the lower part of left forearm-- improving. Fluctuation and clear drainage  around laceration-- almost resolved. Left middle finger movement restricted secondary to pain    SKIN: No rashes   NEURO: Alert, oriented x3   PSYCH: Cooperative, behavior is appropriate      Medications:   Personally  Reviewed.  Medications       acetaminophen  975 mg Oral TID     budesonide-formoterol  2 puff Inhalation BID     DULoxetine  60 mg Oral Daily     loratadine-pseudoePHEDrine  1 tablet Oral Daily     montelukast  10 mg Oral At Bedtime     norethin-eth estradiol-fe  1 tablet Oral Daily     piperacillin-tazobactam  3.375 g Intravenous Q8H     sodium chloride (PF)  3 mL Intracatheter Q8H     vancomycin  1,250 mg Intravenous Q12H       Data reviewed today: I personally reviewed all new medications, labs, imaging/diagnostics reports over the past 24 hours. Pertinent findings include:    Imaging:   No results found for this or any previous visit (from the past 24 hour(s)).    Labs:  Most Recent 3 CBC's:  Recent Labs   Lab Test 08/12/21  0637 08/11/21  1549 08/11/21  1033   WBC 6.6 14.4* 14.4*   HGB 11.0* 12.1 12.5   MCV 89 85 85    392 398     Most Recent 3 BMP's:  Recent Labs   Lab Test 08/13/21  0808 08/12/21  0637 08/11/21  2151 08/11/21  0933   NA  --  136  --  139   POTASSIUM 3.4* 4.1  4.1 3.4* 3.0*   CHLORIDE  --  112*  --  106   CO2  --  21*  --  23   BUN  --  5*  --  10   CR  --  0.65  --  0.70   ANIONGAP  --  3*  --  10   CHINYERE  --  7.4*  --  8.2*   GLC  --  96  --  85       Kat Warren MD  [facility]  Phone: [phone]

## 2021-08-14 VITALS
RESPIRATION RATE: 18 BRPM | HEIGHT: 65 IN | DIASTOLIC BLOOD PRESSURE: 80 MMHG | TEMPERATURE: 98.1 F | HEART RATE: 93 BPM | SYSTOLIC BLOOD PRESSURE: 132 MMHG | BODY MASS INDEX: 26.82 KG/M2 | OXYGEN SATURATION: 99 % | WEIGHT: 161 LBS

## 2021-08-14 PROCEDURE — 99239 HOSP IP/OBS DSCHRG MGMT >30: CPT | Performed by: FAMILY MEDICINE

## 2021-08-14 PROCEDURE — 250N000013 HC RX MED GY IP 250 OP 250 PS 637: Performed by: FAMILY MEDICINE

## 2021-08-14 PROCEDURE — 250N000011 HC RX IP 250 OP 636: Performed by: FAMILY MEDICINE

## 2021-08-14 RX ORDER — L. ACIDOPHILUS/L.BULGARICUS 1MM CELL
1 TABLET ORAL 2 TIMES DAILY
Qty: 20 TABLET | Refills: 0 | Status: SHIPPED | OUTPATIENT
Start: 2021-08-14 | End: 2021-08-24

## 2021-08-14 RX ORDER — DIPHENHYDRAMINE HCL 25 MG
25 TABLET ORAL EVERY 6 HOURS PRN
COMMUNITY
Start: 2021-08-14 | End: 2023-02-28

## 2021-08-14 RX ADMIN — LORATADINE AND PSEUDOEPHEDRINE 1 TABLET: 10; 240 TABLET, EXTENDED RELEASE ORAL at 09:15

## 2021-08-14 RX ADMIN — BUDESONIDE AND FORMOTEROL FUMARATE DIHYDRATE 2 PUFF: 160; 4.5 AEROSOL RESPIRATORY (INHALATION) at 09:15

## 2021-08-14 RX ADMIN — DULOXETINE HYDROCHLORIDE 60 MG: 60 CAPSULE, DELAYED RELEASE PELLETS ORAL at 09:15

## 2021-08-14 RX ADMIN — ACETAMINOPHEN 975 MG: 325 TABLET ORAL at 09:15

## 2021-08-14 RX ADMIN — PIPERACILLIN AND TAZOBACTAM 3.38 G: 3; .375 INJECTION, POWDER, LYOPHILIZED, FOR SOLUTION INTRAVENOUS at 05:08

## 2021-08-14 NOTE — DISCHARGE SUMMARY
[facility] MEDICINE  DISCHARGE SUMMARY     Primary Care Physician: Sagar Bailey  Admission Date: 8/11/2021   Discharge Provider: Kat Warren MD Discharge Date: 8/14/2021   Diet:   Regular   Code Status: Full Code   Activity: DCACTIVITY: Activity as tolerated        Condition at Discharge: Good     REASON FOR PRESENTATION(See Admission Note for Details)     Left hand swelling, redness following dog bite    PRINCIPAL & ACTIVE DISCHARGE DIAGNOSES     Cellulitis of left hand, improving  Left middle finger flexor tenosynovitis minimal fluid  SIRS, resolved  Infected dog bite  Laceration of left middle finger, repaired  Lymphangitis towards left forearm, improved  Moderate persistent asthma  Hypokalemia, replaced    PENDING LABS     Unresulted Labs Ordered in the Past 30 Days of this Admission     Date and Time Order Name Status Description    8/11/2021  1:19 PM Blood Culture Peripheral Blood Preliminary           PROCEDURES ( this hospitalization only)      None    RECOMMENDATIONS TO OUTPATIENT PROVIDER FOR F/U VISIT   Follow-up with primary MD in 1 week  Follow-up with hand surgery as needed      DISPOSITION     Home    SUMMARY OF HOSPITAL COURSE:      Mr. Anahy Aquino is a 41 year old female with a past medical history of  anxiety, depression, moderate persistent asthma ,came with left middle finger laceration following dog bite on 8/10 ( the dog was vaccinated).  Laceration was repaired with sutures and started on Augmentin.  She returned with worsening pain, redness, swelling secondary to cellulitis and early tenosynovitis.  Mri scan revealed minimally increased tenosynovial fluid in the flexor tendon sheath with surrounding erythema, no drainable effusion. Evaluated by hand surgery and ID. Received IV Zosyn and vancomycin. Swelling, redness, pain are much improved prior to discharge.  Did not require surgical intervention.  Left upper extremity ultrasound was negative  for DVT.  Nare culture is negative for MRSA, but positive for MSSA.  Discharging home with Augmentin for 10-day.  Will follow up with primary care physician in a week and hand surgery as needed. Left middle finger movement is markedly improved.      Discharge Medications with Med changes:     Current Discharge Medication List      START taking these medications    Details   diphenhydrAMINE (BENADRYL) 25 MG tablet Take 1 tablet (25 mg) by mouth every 6 hours as needed for itching, sleep or allergies      lactobacillus TABS Take 1 tablet by mouth 2 times daily for 10 days  Qty: 20 tablet, Refills: 0    Associated Diagnoses: Cellulitis of hand, left         CONTINUE these medications which have CHANGED    Details   amoxicillin-clavulanate (AUGMENTIN) 875-125 MG tablet Take 1 tablet by mouth 2 times daily for 10 days  Qty: 20 tablet, Refills: 0    Associated Diagnoses: Cellulitis of hand, left         CONTINUE these medications which have NOT CHANGED    Details   acetaminophen (TYLENOL) 500 MG tablet Take 1,000 mg by mouth every 6 hours as needed for mild pain      albuterol (PROAIR HFA;PROVENTIL HFA;VENTOLIN HFA) 90 mcg/actuation inhaler Inhale 2 puffs into the lungs every 6 hours as needed     Comments: May substitute the equivalent medication per insurance preference.      DULoxetine (CYMBALTA) 30 MG capsule Take 60 mg by mouth daily       ibuprofen (ADVIL/MOTRIN) 600 MG tablet Take 600 mg by mouth every 6 hours as needed for moderate pain      loratadine-pseudoePHEDrine (CLARITIN-D 24 HOUR)  MG 24 hr tablet Take 1 tablet by mouth daily      LORazepam (ATIVAN) 1 MG tablet Take 1 mg by mouth daily as needed for anxiety       montelukast (SINGULAIR) 10 mg tablet Take 10 mg by mouth At Bedtime       norethin-eth estradiol-fe (GILDESS 24 FE) 1-20 MG-MCG(24) tablet Take 1 tablet by mouth daily      SYMBICORT 160-4.5 mcg/actuation inhaler Inhale 2 puffs into the lungs 2 times daily                Rationale for  medication changes:      None      Consults   ID  Hand surgery    Immunizations given this encounter     Most Recent Immunizations   Administered Date(s) Administered     COVID-19,PF,Pfizer 04/23/2021     Tdap (Adacel,Boostrix) 08/10/2021           Anticoagulation Information    N/A    SIGNIFICANT IMAGING FINDINGS     Results for orders placed or performed during the hospital encounter of 08/11/21   XR Hand Left G/E 3 Views    Impression    IMPRESSION: Marginal negative ulnar variance. Soft tissue swelling over the dorsal aspect of the hand over the MCP joint row. No evidence for fracture or dislocation. No erosive changes are identified.     MR Hand Left w/o Contrast    Impression    IMPRESSION:  1.  Moderate soft tissue edema over the dorsum of the hand, extending into the third finger, suggests cellulitis. No evidence of abscess.    2.  There is minimally increased tenosynovial fluid in the flexor tendon sheath, with surrounding edema, that might correlate with symptoms of flexor tenosynovitis. There is no drainable tenosynovial fluid collection.    3.  The extensor tendons are normal without tear, tendinopathy, or tenosynovial effusion.    4.  Normal bones and joints. No fracture, reactive osteitis, or osteomyelitis. No evidence of septic arthritis.   US Upper Extremity Venous Duplex Left    Impression    IMPRESSION:   1.  No deep venous thrombosis in the left upper extremity.       SIGNIFICANT LABORATORY FINDINGS   Potassium 3.5    Discharge Orders        Follow Up Care    Please follow-up with Dr. Han/Paddy Bowen PA-C as needed at [facility]. Call our scheduling line at [phone] to make an appointment for appointment if needed.     Reason for your hospital stay    Infected dog bite     Follow-up and recommended labs and tests     Follow up with PMD in 1 week     Activity    Your activity upon discharge: activity as tolerated     Diet    Follow this diet upon discharge: regular        Examination   Physical Exam   Temp:  [97.7  F (36.5  C)-98.1  F (36.7  C)] 98.1  F (36.7  C)  Pulse:  [87-93] 93  Resp:  [16-18] 18  BP: (122-132)/(68-80) 132/80  SpO2:  [97 %-99 %] 99 %  Wt Readings from Last 1 Encounters:   08/13/21 73 kg (161 lb)     GENERAL:  Alert, appears comfortable, in no acute distress, appears stated age   HEAD:  Normocephalic, without obvious abnormality, atraumatic   EYES:  PERRL, conjunctiva clear,  EOM's intact   NOSE: Nares normal, mucosa normal, no drainage   THROAT: Lips, mucosa, gums normal, mouth moist   NECK: Supple, symmetrical, trachea midline   BACK:   Symmetric, no curvature, ROM normal   LUNGS:   Clear to auscultation bilaterally, no rales, rhonchi, or wheezing, symmetric chest rise on inhalation, respirations unlabored   CHEST WALL:  No tenderness or deformity   HEART:  Regular rate and rhythm, S1 and S2 normal, no murmur     ABDOMEN:   Soft, non-tender, bowel sounds active , no masses, no organomegaly    EXTREMITIES: 1 cm laceration in left middle finger, repaired with sutures, extensive erythema, tenderness on left middle finger extending to left fourth finger, extending to left palm with lymphangitic spreading towards the lower part of left forearm-- significantly improved. Fluctuation and clear drainage around laceration-- resolved. Left middle finger movement much improved   SKIN: No rashes   NEURO: Alert, oriented x3   PSYCH: Cooperative, behavior is appropriate          Please see EMR for more detailed significant labs, imaging, consultant notes etc.    I, Kat Warren MD, personally saw the patient today and spent greater than 30 minutes discharging this patient.    Kat Warren MD  [facility]    CC:Sagar Bailey

## 2021-08-14 NOTE — PROGRESS NOTES
Care Management Discharge Note    Discharge Date: 08/14/2021       Discharge Disposition: Home    Discharge Services: None    Discharge DME: None    Discharge Transportation: family or friend will provide    Private pay costs discussed: Not applicable    PAS Confirmation Code:  (Not needed)  Patient/family educated on Medicare website which has current facility and service quality ratings: other (see comments) (Not Needed)    Education Provided on the Discharge Plan:  Per Team  Persons Notified of Discharge Plans: Patient  Patient/Family in Agreement with the Plan: yes    Handoff Referral Completed: Not needed    Additional Information:  Discharge home today via family. No needs identified. Transitioned to oral antibiotics. At baseline from home with children and independent.         Kristen Ferrara RN

## 2021-08-14 NOTE — PLAN OF CARE
Problem: Pain Acute  Goal: Acceptable Pain Control and Functional Ability  Outcome: Improving     Problem: Adult Inpatient Plan of Care  Goal: Readiness for Transition of Care  Outcome: Improving    Problem: Adult Inpatient Plan of Care  Goal: Optimal Comfort and Wellbeing  Outcome: Improving      Patient alert and oriented, pleasant with cares. Sutures intact to left middle finger, warm water soaks completed with scant purulent drainage noted. Laceration is open to air. Left hand remains slightly more edematous than right hand, erythema has not spread beyond previously defined borders. Continues on IV zosyn with PIV saline locked in between.

## 2021-08-16 ENCOUNTER — HOSPITAL ENCOUNTER (EMERGENCY)
Facility: CLINIC | Age: 41
Discharge: HOME | End: 2021-08-16
Attending: EMERGENCY MEDICINE | Admitting: EMERGENCY MEDICINE
Payer: COMMERCIAL

## 2021-08-16 VITALS
TEMPERATURE: 98.4 F | BODY MASS INDEX: 26.13 KG/M2 | OXYGEN SATURATION: 100 % | RESPIRATION RATE: 16 BRPM | WEIGHT: 157 LBS | HEART RATE: 107 BPM | SYSTOLIC BLOOD PRESSURE: 153 MMHG | DIASTOLIC BLOOD PRESSURE: 98 MMHG

## 2021-08-16 DIAGNOSIS — M79.645 PAIN OF FINGER OF LEFT HAND: ICD-10-CM

## 2021-08-16 LAB
ANION GAP SERPL CALCULATED.3IONS-SCNC: 7 MMOL/L (ref 5–18)
BACTERIA BLD CULT: NO GROWTH
BASOPHILS # BLD AUTO: 0.1 10E3/UL (ref 0–0.2)
BASOPHILS NFR BLD AUTO: 1 %
BUN SERPL-MCNC: 10 MG/DL (ref 8–22)
C REACTIVE PROTEIN LHE: 0.8 MG/DL (ref 0–0.8)
CALCIUM SERPL-MCNC: 8.6 MG/DL (ref 8.5–10.5)
CHLORIDE BLD-SCNC: 109 MMOL/L (ref 98–107)
CO2 SERPL-SCNC: 21 MMOL/L (ref 22–31)
CREAT SERPL-MCNC: 0.79 MG/DL (ref 0.6–1.1)
EOSINOPHIL # BLD AUTO: 0.1 10E3/UL (ref 0–0.7)
EOSINOPHIL NFR BLD AUTO: 1 %
ERYTHROCYTE [DISTWIDTH] IN BLOOD BY AUTOMATED COUNT: 13.2 % (ref 10–15)
GFR SERPL CREATININE-BSD FRML MDRD: >90 ML/MIN/1.73M2
GLUCOSE BLD-MCNC: 94 MG/DL (ref 70–125)
HCT VFR BLD AUTO: 35.6 % (ref 35–47)
HGB BLD-MCNC: 11.8 G/DL (ref 11.7–15.7)
HOLD SPECIMEN: NORMAL
IMM GRANULOCYTES # BLD: 0 10E3/UL
IMM GRANULOCYTES NFR BLD: 0 %
LYMPHOCYTES # BLD AUTO: 2.4 10E3/UL (ref 0.8–5.3)
LYMPHOCYTES NFR BLD AUTO: 33 %
MCH RBC QN AUTO: 28.1 PG (ref 26.5–33)
MCHC RBC AUTO-ENTMCNC: 33.1 G/DL (ref 31.5–36.5)
MCV RBC AUTO: 85 FL (ref 78–100)
MONOCYTES # BLD AUTO: 0.3 10E3/UL (ref 0–1.3)
MONOCYTES NFR BLD AUTO: 5 %
NEUTROPHILS # BLD AUTO: 4.4 10E3/UL (ref 1.6–8.3)
NEUTROPHILS NFR BLD AUTO: 60 %
NRBC # BLD AUTO: 0 10E3/UL
NRBC BLD AUTO-RTO: 0 /100
PLATELET # BLD AUTO: 436 10E3/UL (ref 150–450)
POTASSIUM BLD-SCNC: 3.7 MMOL/L (ref 3.5–5)
RBC # BLD AUTO: 4.2 10E6/UL (ref 3.8–5.2)
SODIUM SERPL-SCNC: 137 MMOL/L (ref 136–145)
WBC # BLD AUTO: 7.3 10E3/UL (ref 4–11)

## 2021-08-16 PROCEDURE — 80048 BASIC METABOLIC PNL TOTAL CA: CPT | Performed by: EMERGENCY MEDICINE

## 2021-08-16 PROCEDURE — 99284 EMERGENCY DEPT VISIT MOD MDM: CPT | Mod: 25

## 2021-08-16 PROCEDURE — 96374 THER/PROPH/DIAG INJ IV PUSH: CPT

## 2021-08-16 PROCEDURE — 85004 AUTOMATED DIFF WBC COUNT: CPT | Performed by: EMERGENCY MEDICINE

## 2021-08-16 PROCEDURE — 250N000011 HC RX IP 250 OP 636: Performed by: EMERGENCY MEDICINE

## 2021-08-16 PROCEDURE — 86141 C-REACTIVE PROTEIN HS: CPT | Performed by: EMERGENCY MEDICINE

## 2021-08-16 PROCEDURE — 999N000127 HC STATISTIC PERIPHERAL IV START W US GUIDANCE

## 2021-08-16 PROCEDURE — 36415 COLL VENOUS BLD VENIPUNCTURE: CPT | Performed by: EMERGENCY MEDICINE

## 2021-08-16 RX ORDER — KETOROLAC TROMETHAMINE 15 MG/ML
15 INJECTION, SOLUTION INTRAMUSCULAR; INTRAVENOUS ONCE
Status: COMPLETED | OUTPATIENT
Start: 2021-08-16 | End: 2021-08-16

## 2021-08-16 RX ORDER — LIDOCAINE 40 MG/G
CREAM TOPICAL
Status: DISCONTINUED | OUTPATIENT
Start: 2021-08-16 | End: 2021-08-16 | Stop reason: HOSPADM

## 2021-08-16 RX ADMIN — KETOROLAC TROMETHAMINE 15 MG: 15 INJECTION, SOLUTION INTRAMUSCULAR; INTRAVENOUS at 13:53

## 2021-08-16 NOTE — DISCHARGE INSTRUCTIONS
You were seen in the emergency department today for recurrence of left finger pain.  Your lab work today is very reassuring with no evidence of infection in your blood or worsening of inflammation.    Continue taking your antibiotics as prescribed. You may take Tylenol and ibuprofen for pain/fever, do not exceed 4000 mg of Tylenol per day or 3200 mg ofibuprofen per day.  Apply ice for 20 minutes/h and elevate to reduce swelling.    You have an appointment scheduled with Dr. Han at the [facility] in [facility] on Wednesday at 10 AM.  Return to the ER if you develop any new or worsening symptoms like severe pain, fever, loss of sensation and function in the finger, or any other symptoms that concern you.

## 2021-08-16 NOTE — PHARMACY-ADMISSION MEDICATION HISTORY
Pharmacy Note - Admission Medication History    Pertinent Provider Information: none     ______________________________________________________________________    Prior To Admission (PTA) med list completed and updated in EMR.       PTA Med List   Medication Sig Last Dose     acetaminophen (TYLENOL) 500 MG tablet Take 1,000 mg by mouth every 6 hours as needed for mild pain Past Week at Unknown time     albuterol (PROAIR HFA;PROVENTIL HFA;VENTOLIN HFA) 90 mcg/actuation inhaler Inhale 2 puffs into the lungs every 6 hours as needed  Past Week at Unknown time     amoxicillin-clavulanate (AUGMENTIN) 875-125 MG tablet Take 1 tablet by mouth 2 times daily for 10 days (Patient taking differently: Take 1 tablet by mouth 2 times daily Started 8/14 ten day course) 8/16/2021 at Unknown time     diphenhydrAMINE (BENADRYL) 25 MG tablet Take 1 tablet (25 mg) by mouth every 6 hours as needed for itching, sleep or allergies Past Week at Unknown time     DULoxetine (CYMBALTA) 30 MG capsule Take 60 mg by mouth daily  8/16/2021 at Unknown time     ibuprofen (ADVIL/MOTRIN) 600 MG tablet Take 600 mg by mouth every 6 hours as needed for moderate pain 8/15/2021 at Unknown time     lactobacillus TABS Take 1 tablet by mouth 2 times daily for 10 days (Patient taking differently: Take 1 tablet by mouth 2 times daily Started 8/14 for 10days) 8/16/2021 at Unknown time     loratadine-pseudoePHEDrine (CLARITIN-D 24 HOUR)  MG 24 hr tablet Take 1 tablet by mouth daily 8/16/2021 at Unknown time     LORazepam (ATIVAN) 1 MG tablet Take 1 mg by mouth daily as needed for anxiety  Past Week at Unknown time     montelukast (SINGULAIR) 10 mg tablet Take 10 mg by mouth At Bedtime  8/16/2021 at Unknown time     norethin-eth estradiol-fe (GILDESS 24 FE) 1-20 MG-MCG(24) tablet Take 1 tablet by mouth daily 8/16/2021 at Unknown time     SYMBICORT 160-4.5 mcg/actuation inhaler Inhale 2 puffs into the lungs 2 times daily  8/16/2021 at Unknown time        Information source(s): Patient and CareEverywhere/[facility]  Method of interview communication: in-person    Summary of Changes to PTA Med List  None      Patient was asked about OTC/herbal products specifically.  PTA med list reflects this.    In the past week, patient estimated taking medication this percent of the time:  greater than 90%.    Allergies were reviewed, assessed, and updated with the patient.      Home medication available for inpatient use:   albuterol, oral contraceptive, symbicort    The information provided in this note is only as accurate as the sources available at the time of the update(s).    Thank you for the opportunity to participate in the care of this patient.    Blaze Moreira [facility]  8/16/2021 12:43 PM

## 2021-08-16 NOTE — ED TRIAGE NOTES
Patient is here with a dog bite that occurred Tuesday and was placed on an antibiotic. She did have red, swollen and pain noted today with oozing.

## 2021-08-16 NOTE — ED PROVIDER NOTES
EMERGENCY DEPARTMENT ENCOUNTER      NAME: Anahy Aquino  AGE: 41 year old female  YOB: 1980  MRN: [id number]  EVALUATION DATE & TIME: 8/16/2021 11:16 AM    PCP: Sagar Bailey    ED PROVIDER: Yue Galdamez PA-C      Chief Complaint   Patient presents with     Dog Bite         FINAL IMPRESSION:  1. Pain of finger of left hand          ED COURSE & MEDICAL DECISION MAKING:    Pertinent Labs & Imaging studies reviewed. (See chart for details)    41 year old female presents to the Emergency Department for evaluation of finger problem.    Physical exam is remarkable for a generally well-appearing female who is in no acute distress.  She has a laceration with 4 sutures in place on the ulnar aspect of the left middle finger.  There is no surrounding erythema, warmth, or purulent discharge.  She reports pain with active range of motion but there is no significant tenderness to palpation.  She has good distal sensation and capillary refills less than 2 seconds.  Vital signs are stable and she is afebrile.    CBC is unremarkable with no leukocytosis or anemia.  BMP is unremarkable with no significant electrolyte derangements, normal kidney function.  CRP mildly elevated at 0.8 but significantly improved when compared to previous.    Patient was given Toradol for pain.  One of the sutures was coming out, gently removed here. Discussed presentation with on-call [facility], they recommend follow-up in clinic at this time.  I think this is completely appropriate, the patient's physical exam is very reassuring and my concern for recurrent tenosynovitis or significant deep space infection is low at this time.  Her lab work is overall very reassuring as well.  Advised her to continue taking her Augmentin, recommend Tylenol and ibuprofen, ice, and elevation.  Advised follow-up with [facility], they organized an appointment for 10:00 AM on Wednesday.  Advised return to the ER if she develops any  new or worsening symptoms like severe pain, loss of sensation or function in the hand, fever, worsening symptoms, or any other concerning symptoms.  Patient is amenable to this treatment plan and verbalized her understanding.  Care was discussed with Dr. Ferguson who is in agreement with the treatment plan.    ED Course   11:21 AM Performed my initial history and physical exam. Discussed workup in the emergency department, management of symptoms, and likely disposition. PPE: Provider wore gloves, eye protection, and paper mask.  11:33 AM I staffed the patient with my colleague, Dr. Ferguson, who will evaluate the patient. Dr. Ferguson was updated on the patient throughout ED course.   12:58 PM I rechecked and updated the patient.  1:50 PM I rechecked and updated the patient.  2:39 PM I spoke with Dr. Niño who will see the patient on Wednesday (8/18/21).  2:40 PM I rechecked and updated the patient. I discussed the plan for discharge with the patient, and patient is agreeable. We discussed supportivecares at home and reasons for return to the ER including new or worsening symptoms - all questions and concerns addressed. Patient to be discharged by RN.    Voice recognition software was used in the creation of this note. Any grammatical or nonsensical errors are due to inherent errors with the software and are not the intention of the writer.     MEDICATIONS GIVEN IN THE EMERGENCY:  Medications   lidocaine 1 % 0.1-5 mL (has no administration in time range)   lidocaine (LMX4) cream (has no administration in time range)   sodium chloride (PF) 0.9% PF flush 10-40 mL (has no administration in time range)   ketorolac (TORADOL) injection 15 mg (15 mg Intravenous Given 8/16/21 1353)       NEW PRESCRIPTIONS STARTED AT TODAY'S ER VISIT  New Prescriptions    No medications on file            =================================================================    HPI    Patient information was obtained from: the patient    Use of  "Intrepreter: N/A      Anahy Aquino is a 41 year old year old female with a pertinent medical history of asthma, cellulitis of left hand who presents to the ED for the evaluation of a dog bite.    Per chart review, the patient was seen in this ED on 08/10/21 after a dog bite.  The patient's laceration was repaired with sutures and she was started on Augmentin.  She returned on 08/11/21 with worsening pain, redness, swelling secondary to cellulitis and early tenosynovitis. She was admitted until 08/14/21. Mri scan revealed minimally increased tenosynovial fluid in the flexor tendon sheath with surrounding erythema, no drainable effusion. Evaluated by hand surgery and ID. Received IV Zosyn and vancomycin. Swelling, redness, pain were much improved prior to discharge.  The patient did not require surgical intervention.  Left upper extremity ultrasound was negative for DVT.  Nare culture is negative for MRSA, but positive for MSSA.  Discharging home with Augmentin for 10-day.  Will follow up with primary care physician in a week and hand surgery as needed. Left middle finger movement is markedly improved.      The patient reports her hand felt \"normal\" yesterday.  However, beginning today, she began to have symptoms where she was bit by a dog at her left middle finger.  The patient reports increased pain in the area, green/yellow exudate, it was hot to the touch, measure fever of 100.8F, and nausea.  She also notes diarrhea but believes that is due to her antibiotics.  The patient has taken tylenol and ibuprofen for her symptoms.  She reports she is also taking her prescribed Augmentin    Of note, she reports one of her stitches at her bite clau have fallen out.      The patient denies vomiting and any other symptoms or complaints at this time.      REVIEW OF SYSTEMS   Constitutional: Positive for measured fever.  GI:  No reported vomiting.  Positive for nausea and diarrhea.  Musculoskeletal:  Positive for pain to left " middle finger.  Skin:  No reported rash.  Positive for swelling of left middle finger, hot to touch, and green/yellow exudate from wound.     All other systems reviewed and are negative unless noted in HPI.      PAST MEDICAL HISTORY:  Past Medical History:   Diagnosis Date     Anemia      Anxiety      Asthma      Endometriosis      PCOS (polycystic ovarian syndrome)        PAST SURGICAL HISTORY:  Past Surgical History:   Procedure Laterality Date     ANKLE SURGERY       BRACHIAL PLEXUS EXPLORATION       LAPAROSCOPIC ENDOMETRIOSIS FULGURATION       IL HYSTEROSCOPY,W/ENDO BX N/A 11/30/2020    Procedure: HYSTEROSCOPY WITH DILATION AND CURETTAGE, CERVICAL POLYPECTOMY AND PLACEMENT OF MIRENA INTRAUTERINE DEVICE;  Surgeon: Jossy Rodriguez MD;  Location: [facility];  Service: Gynecology     TEAR DUCT SURGERY         CURRENT MEDICATIONS:    acetaminophen (TYLENOL) 500 MG tablet  albuterol (PROAIR HFA;PROVENTIL HFA;VENTOLIN HFA) 90 mcg/actuation inhaler  amoxicillin-clavulanate (AUGMENTIN) 875-125 MG tablet  diphenhydrAMINE (BENADRYL) 25 MG tablet  DULoxetine (CYMBALTA) 30 MG capsule  ibuprofen (ADVIL/MOTRIN) 600 MG tablet  lactobacillus TABS  loratadine-pseudoePHEDrine (CLARITIN-D 24 HOUR)  MG 24 hr tablet  LORazepam (ATIVAN) 1 MG tablet  montelukast (SINGULAIR) 10 mg tablet  norethin-eth estradiol-fe (GILDESS 24 FE) 1-20 MG-MCG(24) tablet  SYMBICORT 160-4.5 mcg/actuation inhaler        ALLERGIES:  Allergies   Allergen Reactions     Amoxicillin Other (See Comments)     Per patient, she took amoxicillin chronically and it is no longer effective     Letrozole Other (See Comments)     Asthma attack per pt     Vicodin [Hydrocodone-Acetaminophen] Hives       FAMILY HISTORY:  No family history on file.    SOCIAL HISTORY:   Social History     Socioeconomic History     Marital status:      Spouse name: Not on file     Number of children: Not on file     Years of education: Not on file     Highest education  level: Not on file   Occupational History     Not on file   Tobacco Use     Smoking status: Never Smoker     Smokeless tobacco: Never Used   Substance and Sexual Activity     Alcohol use: Yes     Drug use: Never     Sexual activity: Not on file   Other Topics Concern     Not on file   Social History Narrative     Not on file     Social Determinants of Health     Financial Resource Strain:      Difficulty of Paying Living Expenses:    Food Insecurity:      Worried About Running Out of Food in the Last Year:      Ran Out of Food in the Last Year:    Transportation Needs:      Lack of Transportation (Medical):      Lack of Transportation (Non-Medical):    Physical Activity:      Days of Exercise per Week:      Minutes of Exercise per Session:    Stress:      Feeling of Stress :    Social Connections:      Frequency of Communication with Friends and Family:      Frequency of Social Gatherings with Friends and Family:      Attends Sikhism Services:      Active Member of Clubs or Organizations:      Attends Club or Organization Meetings:      Marital Status:    Intimate Partner Violence:      Fear of Current or Ex-Partner:      Emotionally Abused:      Physically Abused:      Sexually Abused:        VITALS:  Patient Vitals for the past 24 hrs:   BP Temp Temp src Pulse Resp SpO2 Weight   08/16/21 1056 (!) 153/98 98.4  F (36.9  C) Temporal 107 16 100 % 71.2 kg (157 lb)       PHYSICAL EXAM    VITAL SIGNS: BP (!) 153/98   Pulse 107   Temp 98.4  F (36.9  C) (Temporal)   Resp 16   Wt 71.2 kg (157 lb)   LMP  (LMP Unknown)   SpO2 100%   BMI 26.13 kg/m    General Appearance: Alert, cooperative, normal speech and facial symmetry, appears stated age, the patient does not appear in distress  Head:  Normocephalic, without obvious abnormality, atraumatic  Cardio:  Regular rate and rhythm, S1 and S2 normal, no murmur, rub    or gallop, 2+ pulses symmetric in all extremities  Pulm:  Clear to auscultation bilaterally,  respirations unlabored with no accessory muscle use  Extremities:  Laceration with 4 sutures in place on the ulnar aspect of the left middle finger.  There is no surrounding erythema, warmth, or purulent discharge.  She reports pain with active range of motion but there is no significant tenderness to palpation.  She has good distal sensation and capillary refills less than 2 seconds.  Neuro: Patient is awake, alert, and responsive to voice. No gross motor weaknesses or sensory loss; moves all extremities.             LAB:  All pertinent labs reviewed and interpreted.  Labs Ordered and Resulted from Time of ED Arrival Up to the Time of Departure from the ED   BASIC METABOLIC PANEL - Abnormal; Notable for the following components:       Result Value    Chloride 109 (*)     Carbon Dioxide (CO2) 21 (*)     All other components within normal limits   CRP INFLAMMATION - Abnormal; Notable for the following components:    CRP 0.8 (*)     All other components within normal limits   CBC WITH PLATELETS & DIFFERENTIAL    Narrative:     The following orders were created for panel order CBC with platelets differential.  Procedure                               Abnormality         Status                     ---------                               -----------         ------                     CBC with platelets and d...[[id number]]                      Final result                 Please view results for these tests on the individual orders.   CBC WITH PLATELETS AND DIFFERENTIAL   EXTRA BLOOD CULTURE BOTTLE   PERIPHERAL IV CATHETER   NURSING DRAW AND HOLD   APPLY WARM PACK   DRESSING   NO   DISCHARGE PLANNING   CALL   EXTRA TUBE    Narrative:     The following orders were created for panel order [place] Draw.  Procedure                               Abnormality         Status                     ---------                               -----------         ------                     Extra Blood Culture Bottle[[id number]]                        Final result                 Please view results for these tests on the individual orders.       RADIOLOGY:  Reviewed all pertinent imaging. Please see official radiology report.  No orders to display       EKG:    None      I, Ion Santoyo, am serving as a scribe to document services personally performed by Yue Galdamez PA-C based on my observation and the provider's statements to me. I, Yue Galdamez PA-C attest that Ion Santoyo is acting in a scribe capacity, has observed my performance of the services and has documented them in accordance with my direction.     Yue Galdamez PA-C  Emergency Medicine  [facility] EMERGENCY ROOM  [address] [facility] [address]  [phone]  Dept: [phone]     Yue Galdamez PA-C  08/16/21 151

## 2021-08-16 NOTE — ED PROVIDER NOTES
I, Bethany Freguson have reviewed the documentation, personally taken the patient's history, performed an exam and agree with the physical finds, diagnosis and management plan.    HPI:  8/10 had laceration s/p dog bite.  Lac repaired.  Admitted 8/11-8/14 for flexor tenosynovitis. Felt well after discharge. No pain/redness/discharge yesterday. This am swollen, green/yellow discharge. Temp 100.8 this am.  On vanco/zosyn and discharge on augmentin.  Took apap/motrin pta.     Physical Exam:    Afebrile here.  Rate 107.  Left middle finger has sutured laceration along the ulnar aspect of the proximal phalanx. It is clean, no appreciated erythema, swelling, or streaking erythema.  I am not able to express any drainage or purulence.  Pain with active range of motion.  When distracted she does not have any pain with passive extension.    MDM: Her incision is clean and dry.  She has subjective symptoms of pain only.  Is afebrile here.  I am very reassured her exam is quite benign and my suspicion that this is a flexor tenosynovitis or deeper space hand infection is quite low.    ED Course/workup: CBC, BMP, CRP obtained and unremarkable.  Her CRP is 0.8 down from 2.4 just several days ago.  Discussed with hand surgery, plan will be to discharged home continuing her previously prescribed antibiotics and they will see her in clinic in 2 days.      12:42 PM I met with the patient for interview and physical examination. Discussed plan for treatment and workup in the ED.        Final Diagnosis:     ICD-10-CM    1. Pain of finger of left hand  M79.645            MD Marty Hall Zabrina N, MD  08/16/21 1446

## 2021-08-16 NOTE — ED NOTES
Pt has dog bite to left hand.  Was admitted over the weekend and has multiple bruises from IVs.  Pt requested a mid-line.  MD madrid

## 2021-09-19 ENCOUNTER — HEALTH MAINTENANCE LETTER (OUTPATIENT)
Age: 41
End: 2021-09-19

## 2021-11-19 ENCOUNTER — IMMUNIZATION (OUTPATIENT)
Dept: NURSING | Facility: CLINIC | Age: 41
End: 2021-11-19
Payer: COMMERCIAL

## 2021-11-19 PROCEDURE — 91300 PR COVID VAC PFIZER DIL RECON 30 MCG/0.3 ML IM: CPT

## 2021-11-19 PROCEDURE — 0004A PR COVID VAC PFIZER DIL RECON 30 MCG/0.3 ML IM: CPT

## 2022-06-21 ENCOUNTER — TRANSCRIBE ORDERS (OUTPATIENT)
Dept: OTHER | Age: 42
End: 2022-06-21

## 2022-06-21 DIAGNOSIS — U07.1 COVID-19: Primary | ICD-10-CM

## 2022-06-22 ENCOUNTER — TRANSCRIBE ORDERS (OUTPATIENT)
Dept: OTHER | Age: 42
End: 2022-06-22

## 2022-06-22 DIAGNOSIS — R05.9 COUGH: Primary | ICD-10-CM

## 2022-06-22 DIAGNOSIS — Z86.16 HISTORY OF COVID-19: ICD-10-CM

## 2022-06-22 DIAGNOSIS — J45.40 MODERATE PERSISTENT ASTHMA WITHOUT COMPLICATION: ICD-10-CM

## 2022-06-26 ENCOUNTER — HEALTH MAINTENANCE LETTER (OUTPATIENT)
Age: 42
End: 2022-06-26

## 2022-08-23 ENCOUNTER — TRANSFERRED RECORDS (OUTPATIENT)
Dept: HEALTH INFORMATION MANAGEMENT | Facility: CLINIC | Age: 42
End: 2022-08-23

## 2022-09-30 ENCOUNTER — MEDICAL CORRESPONDENCE (OUTPATIENT)
Dept: HEALTH INFORMATION MANAGEMENT | Facility: CLINIC | Age: 42
End: 2022-09-30

## 2022-11-01 ENCOUNTER — TRANSFERRED RECORDS (OUTPATIENT)
Dept: HEALTH INFORMATION MANAGEMENT | Facility: CLINIC | Age: 42
End: 2022-11-01

## 2022-11-21 ENCOUNTER — HEALTH MAINTENANCE LETTER (OUTPATIENT)
Age: 42
End: 2022-11-21

## 2023-02-07 ENCOUNTER — MYC MEDICAL ADVICE (OUTPATIENT)
Dept: FAMILY MEDICINE | Facility: CLINIC | Age: 43
End: 2023-02-07

## 2023-02-07 ENCOUNTER — VIRTUAL VISIT (OUTPATIENT)
Dept: FAMILY MEDICINE | Facility: CLINIC | Age: 43
End: 2023-02-07
Payer: COMMERCIAL

## 2023-02-07 DIAGNOSIS — Z76.89 ENCOUNTER TO ESTABLISH CARE: Primary | ICD-10-CM

## 2023-02-07 DIAGNOSIS — E89.0 HISTORY OF PARTIAL THYROIDECTOMY: ICD-10-CM

## 2023-02-07 DIAGNOSIS — M13.0 POLYARTHRITIS: ICD-10-CM

## 2023-02-07 DIAGNOSIS — C73 MALIGNANT NEOPLASM OF THYROID GLAND (H): ICD-10-CM

## 2023-02-07 DIAGNOSIS — R89.4 ABNORMAL IMMUNOLOGY FINDINGS: ICD-10-CM

## 2023-02-07 DIAGNOSIS — U09.9 LONG COVID: ICD-10-CM

## 2023-02-07 DIAGNOSIS — E89.0 HISTORY OF PARTIAL THYROIDECTOMY: Primary | ICD-10-CM

## 2023-02-07 PROBLEM — K21.9 CHRONIC GERD: Status: ACTIVE | Noted: 2022-11-04

## 2023-02-07 PROBLEM — R60.9 SWELLING: Status: ACTIVE | Noted: 2022-01-06

## 2023-02-07 PROBLEM — I10 PRIMARY HYPERTENSION: Status: ACTIVE | Noted: 2022-05-15

## 2023-02-07 PROBLEM — R59.9 ADENOPATHY: Status: ACTIVE | Noted: 2022-01-06

## 2023-02-07 PROBLEM — R63.5 UNEXPLAINED WEIGHT GAIN: Status: ACTIVE | Noted: 2022-04-15

## 2023-02-07 PROBLEM — M79.10 MYALGIA: Status: ACTIVE | Noted: 2022-11-04

## 2023-02-07 PROBLEM — M12.9 ARTHROPATHY: Status: ACTIVE | Noted: 2022-11-04

## 2023-02-07 PROBLEM — N39.3 STRESS INCONTINENCE IN FEMALE: Status: ACTIVE | Noted: 2023-02-07

## 2023-02-07 PROBLEM — J45.909 ASTHMA WITHOUT STATUS ASTHMATICUS: Status: ACTIVE | Noted: 2023-02-07

## 2023-02-07 PROBLEM — E04.1 THYROID NODULE: Status: ACTIVE | Noted: 2022-08-25

## 2023-02-07 PROBLEM — G93.32 CHRONIC FATIGUE SYNDROME: Status: ACTIVE | Noted: 2023-02-07

## 2023-02-07 PROBLEM — E28.2 POLYCYSTIC OVARIES: Status: ACTIVE | Noted: 2023-02-07

## 2023-02-07 PROBLEM — F32.9 MAJOR DEPRESSION, SINGLE EPISODE: Status: ACTIVE | Noted: 2023-02-07

## 2023-02-07 PROBLEM — M62.9 DISORDER OF MUSCLE: Status: ACTIVE | Noted: 2023-02-07

## 2023-02-07 PROBLEM — D75.839 THROMBOCYTOSIS: Status: ACTIVE | Noted: 2022-11-04

## 2023-02-07 PROCEDURE — 99204 OFFICE O/P NEW MOD 45 MIN: CPT | Mod: 95 | Performed by: NURSE PRACTITIONER

## 2023-02-07 RX ORDER — THYROID, PORCINE 60 MG/1
65 TABLET ORAL DAILY
Qty: 90 TABLET | Refills: 1 | Status: SHIPPED | OUTPATIENT
Start: 2023-02-07 | End: 2023-02-09

## 2023-02-07 ASSESSMENT — ASTHMA QUESTIONNAIRES
QUESTION_1 LAST FOUR WEEKS HOW MUCH OF THE TIME DID YOUR ASTHMA KEEP YOU FROM GETTING AS MUCH DONE AT WORK, SCHOOL OR AT HOME: NONE OF THE TIME
QUESTION_5 LAST FOUR WEEKS HOW WOULD YOU RATE YOUR ASTHMA CONTROL: COMPLETELY CONTROLLED
ACT_TOTALSCORE: 21
QUESTION_4 LAST FOUR WEEKS HOW OFTEN HAVE YOU USED YOUR RESCUE INHALER OR NEBULIZER MEDICATION (SUCH AS ALBUTEROL): TWO OR THREE TIMES PER WEEK
ACT_TOTALSCORE: 21
QUESTION_2 LAST FOUR WEEKS HOW OFTEN HAVE YOU HAD SHORTNESS OF BREATH: THREE TO SIX TIMES A WEEK
QUESTION_3 LAST FOUR WEEKS HOW OFTEN DID YOUR ASTHMA SYMPTOMS (WHEEZING, COUGHING, SHORTNESS OF BREATH, CHEST TIGHTNESS OR PAIN) WAKE YOU UP AT NIGHT OR EARLIER THAN USUAL IN THE MORNING: NOT AT ALL

## 2023-02-07 NOTE — PROGRESS NOTES
Anahy is a 42 year old who is being evaluated via a billable video visit.      How would you like to obtain your AVS? Bogdanhart  If the video visit is dropped, the invitation should be resent by: Text to cell phone: [phone]  Will anyone else be joining your video visit? No          Assessment & Plan     Encounter to establish care  Reviewed medical/social/family history and health maintenance.  Patient formally seen through [facility].  Fortunately most of her records are available from her specialist appointments and she will work on sending in copies of her other test results.    History of partial thyroidectomy  Previously on levothyroxine 75 mg.  She did find this initially helpful but is wondering if there is any possibility of side effects from the medication that are contributing to her inflammatory issues.  We did discuss that we could try an alternative formulary and I would recommend she get a recheck TSH in 2 months to make sure we are still adequately treating her thyroid symptoms.  I would like her to follow-up with endocrinology with a history of thyroid cancer to determine if any ongoing monitoring is indicated.  - thyroid (ARMOUR) 65 MG tablet; Take 1 tablet (65 mg) by mouth daily  - Adult Endocrinology  Referral; Future  - **TSH with free T4 reflex FUTURE 2mo; Future    Malignant neoplasm of thyroid gland (H)  See above.  - thyroid (ARMOUR) 65 MG tablet; Take 1 tablet (65 mg) by mouth daily  - Adult Endocrinology  Referral; Future    Polyarthritis  Complicated case following her COVID infection.  There likely is fibromyalgia component, however I would not expect to see persistently elevated inflammatory markers.  She has been on chronic steroids which is not ideal for pain management. She also has fairly advanced arthritis in some of her joints that would be atypical for someone her age.  She was previously evaluated by rheumatologist and told they could do nothing for  her considering that she was not rheumatoid factor positive, however may be worthwhile evaluating for other causes like lupus.  - Adult Rheumatology  Referral; Future  - DX Hip/Pelvis/Spine; Future    Long COVID  Ultimately I think the most beneficial referral would be to get the patient into the long COVID clinic considering most of her symptoms started following her COVID infection.  Symptoms seem to be consistent with autoimmune pathology and have been ongoing for 14 months.  Ideally we would not keep her on long-term suppressive steroids and had concerns about bone health, glucose management, and fluid retention.  - Adult Post Covid Clinic Referral; Future    Abnormal immunology findings  Patient paid for outpatient immunology testing which did have some positive findings.  As I am unfamiliar with these test I think would be reasonable to refer her to an immunologist to discuss whether or not these positive results could be contributing to increased inflammatory markers and other symptoms.  - Immunology Referral; Future                 No follow-ups on file.    SASHA Arroyo CNP  [facility]    Corwin Higginbotham is a 42 year old, presenting for the following health issues:  COVID  (COVID Long Haul Symptoms)      History of Present Illness       Reason for visit:  Long COVID continued issues - primary moved    She eats 4 or more servings of fruits and vegetables daily.She consumes 0 sweetened beverage(s) daily.She exercises with enough effort to increase her heart rate 9 or less minutes per day.  She exercises with enough effort to increase her heart rate 3 or less days per week.   She is taking medications regularly.       Seeing a provider at [facility] who left, and is going to [place].  Hasn't onboarded yet.    Since had COVID had a plethora of issues for about 14 months.  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  Something is triggering full body inflammation.  Gained 60lbs in 6 weeks, but lack of appetitie.  CRP and Sed Rates continue to be high despite WBC counts leveling off  Saw hematologist and platelets were high, thought that   Wondering about lymphoma?  Enlarged lymph nodes since COVID.    Has been on steroids, but continues to have elevated Sed Rates    History of thyroid cancer and thyroidectomy.      Went to [facility] rheumatology.  Was told they cant do anything because rheumatoid factor is not elevated.      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 Had amniotic fluid injection to see if that would help while waiting to see a different ortho for second opinion, wanting to avoid surgery.    Neurologist thought CMC joint issue different from anything else in body.  Also has bilateral knee issues, getting \"chicken fat\" shots for 5 years, was a  growing up    Other joints that are also painful.       Cough, SOB, HTN, elevated  Fatigue Brain fog, elevated alk phos  Weight gain  GERD worse  Eye dryness,  Persistent swelling glands    Has seen multiple specialists including cardiology, hematology, neurology, rheumatology, allergist,   Fatigue is only because she feels she can't move.  Was referred to the fibromyalgia clinic    When starting synthroid and increased dose, was able to lose about 5lbs in a week and night sweats went away.      Kenalog shots weren't working after 2 months, was switched to oral prednisone.  Prednisone is not working, taking a 10mg dose.  Pushes to move through the pain, just feels like she can't function.    Paid for an outside Immune test.      Prior history of TBI in 2008, was treated at [facility].    On occasion does IV with vitamins and minerals.      May also try to get established with a Rheumatologist in [place].      Review of Systems   Constitutional, HEENT, cardiovascular, pulmonary, gi and gu systems are negative, except as otherwise noted.      Objective     "       Vitals:  No vitals were obtained today due to virtual visit.    Physical Exam   GENERAL: Healthy, alert and no distress  EYES: Eyes grossly normal to inspection.  No discharge or erythema, or obvious scleral/conjunctival abnormalities.  RESP: No audible wheeze, cough, or visible cyanosis.  No visible retractions or increased work of breathing.    SKIN: Visible skin clear. No significant rash, abnormal pigmentation or lesions.  NEURO: Cranial nerves grossly intact.  Mentation and speech appropriate for age.  PSYCH: Mentation appears normal, affect normal/bright, judgement and insight intact, normal speech and appearance well-groomed.                Video-Visit Details    Type of service:  Video Visit     Originating Location (pt. Location): Home    Distant Location (provider location):  Off-site  Platform used for Video Visit: [employer]

## 2023-02-08 NOTE — TELEPHONE ENCOUNTER
Osito Martinez has sent 4 separate [employer] messages with attachments for you to review.    Christel Valderrama RN, BSN  [facility]

## 2023-02-08 NOTE — TELEPHONE ENCOUNTER
Can you advise? Patient's comment/question copied below .Pharmacy selected     The dose of armor only comes in 30, 60 or 90 not in 65, would one of those work? Maybe the 90 to start?  If not, I can try another pharmacy but they are saying its disconinuted in the 65

## 2023-02-09 ENCOUNTER — TELEPHONE (OUTPATIENT)
Dept: FAMILY MEDICINE | Facility: CLINIC | Age: 43
End: 2023-02-09
Payer: COMMERCIAL

## 2023-02-09 RX ORDER — THYROID 60 MG/1
60 TABLET ORAL DAILY
Qty: 90 TABLET | Refills: 0 | Status: SHIPPED | OUTPATIENT
Start: 2023-02-09 | End: 2023-04-06

## 2023-02-09 NOTE — TELEPHONE ENCOUNTER
She has called again about this . Currently taking synthroid 75  See message about dosing below

## 2023-02-09 NOTE — TELEPHONE ENCOUNTER
Pt called   This is about the Sabinal dosing  Se mychart dated 2/7 . This issue was sent to [facility] late yesterday so I told pt we will get back to her with the recommendation. She has enough until Monday     Christel Valderrama RN, BSN  [facility]

## 2023-02-13 NOTE — TELEPHONE ENCOUNTER
"Writer responded via [employer].    Per other 2/7/23 "[employer]har[employer] encounter:  \"Sent in 60mg, this would be the closest conversion to 75mcg of Synthroid\"    DILEEP HamiltonN, RN-BC  [facility]    "

## 2023-02-26 ENCOUNTER — MYC MEDICAL ADVICE (OUTPATIENT)
Dept: FAMILY MEDICINE | Facility: CLINIC | Age: 43
End: 2023-02-26
Payer: COMMERCIAL

## 2023-02-28 ENCOUNTER — VIRTUAL VISIT (OUTPATIENT)
Dept: FAMILY MEDICINE | Facility: CLINIC | Age: 43
End: 2023-02-28
Payer: COMMERCIAL

## 2023-02-28 DIAGNOSIS — Z13.1 SCREENING FOR DIABETES MELLITUS: ICD-10-CM

## 2023-02-28 DIAGNOSIS — G93.32 CHRONIC FATIGUE SYNDROME: ICD-10-CM

## 2023-02-28 DIAGNOSIS — U09.9 LONG COVID: Primary | ICD-10-CM

## 2023-02-28 DIAGNOSIS — M79.7 FIBROMYALGIA: ICD-10-CM

## 2023-02-28 PROCEDURE — 99215 OFFICE O/P EST HI 40 MIN: CPT | Mod: VID | Performed by: NURSE PRACTITIONER

## 2023-02-28 RX ORDER — PREDNISONE 5 MG/1
5 TABLET ORAL DAILY
Qty: 60 TABLET | Refills: 0 | Status: SHIPPED | OUTPATIENT
Start: 2023-02-28 | End: 2023-05-01

## 2023-02-28 SDOH — SOCIAL STABILITY: SOCIAL NETWORK: I HAVE TROUBLE DOING ALL OF THE ACTIVITIES WITH FRIENDS THAT I WANT TO DO: SOMETIMES

## 2023-02-28 SDOH — SOCIAL STABILITY: SOCIAL NETWORK: I HAVE TROUBLE DOING ALL OF THE FAMILY ACTIVITIES THAT I WANT TO DO: SOMETIMES

## 2023-02-28 SDOH — SOCIAL STABILITY: SOCIAL NETWORK: I HAVE TROUBLE DOING ALL OF MY USUAL WORK (INCLUDE WORK AT HOME): SOMETIMES

## 2023-02-28 SDOH — SOCIAL STABILITY: SOCIAL NETWORK

## 2023-02-28 SDOH — SOCIAL STABILITY: SOCIAL NETWORK: I HAVE TROUBLE DOING ALL OF MY REGULAR LEISURE ACTIVITIES WITH OTHERS: SOMETIMES

## 2023-02-28 SDOH — SOCIAL STABILITY: SOCIAL NETWORK: PROMIS ABILITY TO PARTICIPATE IN SOCIAL ROLES & ACTIVITIES T-SCORE: 44.8

## 2023-02-28 ASSESSMENT — ENCOUNTER SYMPTOMS
NECK MASS: 1
HEMOPTYSIS: 0
MUSCLE WEAKNESS: 1
ARTHRALGIAS: 1
NAUSEA: 1
NIGHT SWEATS: 1
TROUBLE SWALLOWING: 1
SHORTNESS OF BREATH: 1
POLYPHAGIA: 0
HALLUCINATIONS: 0
JOINT SWELLING: 1
SKIN CHANGES: 0
DISTURBANCES IN COORDINATION: 1
EYE IRRITATION: 1
NECK PAIN: 1
WHEEZING: 1
WEAKNESS: 1
CHILLS: 1
VOMITING: 0
COUGH DISTURBING SLEEP: 1
DIARRHEA: 0
DECREASED CONCENTRATION: 1
DECREASED APPETITE: 1
EYE PAIN: 0
HEARTBURN: 1
HEADACHES: 1
DYSPNEA ON EXERTION: 1
BLOOD IN STOOL: 0
SPEECH CHANGE: 0
BACK PAIN: 1
FEVER: 0
INSOMNIA: 0
NUMBNESS: 1
FATIGUE: 1
BLOATING: 1
RECTAL PAIN: 0
NERVOUS/ANXIOUS: 1
SEIZURES: 0
SINUS PAIN: 1
WEIGHT LOSS: 0
JAUNDICE: 0
SMELL DISTURBANCE: 0
PANIC: 1
EYE REDNESS: 0
COUGH: 1
HOARSE VOICE: 1
MYALGIAS: 1
SWOLLEN GLANDS: 1
SINUS CONGESTION: 1
POOR WOUND HEALING: 0
BOWEL INCONTINENCE: 0
ALTERED TEMPERATURE REGULATION: 1
TREMORS: 0
INCREASED ENERGY: 1
SORE THROAT: 1
MUSCLE CRAMPS: 1
TASTE DISTURBANCE: 0
ABDOMINAL PAIN: 1
LOSS OF CONSCIOUSNESS: 0
DOUBLE VISION: 1
PARALYSIS: 0
TINGLING: 0
MEMORY LOSS: 1
SPUTUM PRODUCTION: 1
WEIGHT GAIN: 1
POLYDIPSIA: 0
DIZZINESS: 0
DEPRESSION: 0
NAIL CHANGES: 1
CONSTIPATION: 0
EYE WATERING: 0
STIFFNESS: 1
POSTURAL DYSPNEA: 1
BRUISES/BLEEDS EASILY: 1
SNORES LOUDLY: 0

## 2023-02-28 ASSESSMENT — PATIENT HEALTH QUESTIONNAIRE - PHQ9
10. IF YOU CHECKED OFF ANY PROBLEMS, HOW DIFFICULT HAVE THESE PROBLEMS MADE IT FOR YOU TO DO YOUR WORK, TAKE CARE OF THINGS AT HOME, OR GET ALONG WITH OTHER PEOPLE: NOT DIFFICULT AT ALL
SUM OF ALL RESPONSES TO PHQ QUESTIONS 1-9: 0
10. IF YOU CHECKED OFF ANY PROBLEMS, HOW DIFFICULT HAVE THESE PROBLEMS MADE IT FOR YOU TO DO YOUR WORK, TAKE CARE OF THINGS AT HOME, OR GET ALONG WITH OTHER PEOPLE: NOT DIFFICULT AT ALL
SUM OF ALL RESPONSES TO PHQ QUESTIONS 1-9: 0

## 2023-02-28 ASSESSMENT — ANXIETY QUESTIONNAIRES
IF YOU CHECKED OFF ANY PROBLEMS ON THIS QUESTIONNAIRE, HOW DIFFICULT HAVE THESE PROBLEMS MADE IT FOR YOU TO DO YOUR WORK, TAKE CARE OF THINGS AT HOME, OR GET ALONG WITH OTHER PEOPLE: NOT DIFFICULT AT ALL
7. FEELING AFRAID AS IF SOMETHING AWFUL MIGHT HAPPEN: NOT AT ALL
GAD7 TOTAL SCORE: 0
6. BECOMING EASILY ANNOYED OR IRRITABLE: NOT AT ALL
7. FEELING AFRAID AS IF SOMETHING AWFUL MIGHT HAPPEN: NOT AT ALL
GAD7 TOTAL SCORE: 0
2. NOT BEING ABLE TO STOP OR CONTROL WORRYING: NOT AT ALL
4. TROUBLE RELAXING: NOT AT ALL
5. BEING SO RESTLESS THAT IT IS HARD TO SIT STILL: NOT AT ALL
1. FEELING NERVOUS, ANXIOUS, OR ON EDGE: NOT AT ALL
8. IF YOU CHECKED OFF ANY PROBLEMS, HOW DIFFICULT HAVE THESE MADE IT FOR YOU TO DO YOUR WORK, TAKE CARE OF THINGS AT HOME, OR GET ALONG WITH OTHER PEOPLE?: NOT DIFFICULT AT ALL
GAD7 TOTAL SCORE: 0
3. WORRYING TOO MUCH ABOUT DIFFERENT THINGS: NOT AT ALL

## 2023-02-28 NOTE — PROGRESS NOTES
Anahy is a 42 year old who is being evaluated via a billable video visit.      How would you like to obtain your AVS? MyChart  If the video visit is dropped, the invitation should be resent by: Text to cell phone: [phone]  Will anyone else be joining your video visit? No          Assessment & Plan     Long COVID  Chronic fatigue syndrome  Fibromyalgia  Ideally, she would not continue long-term steroid therapy.  I am willing to prescribe low-dose daily therapy x 60 tablets and we should work towards continuing to taper off this medication.  [facility] how we can treat her chronic inflammation and pain as this is out of my scope of practice.  She is seeing various other specialists and does have an appointment with Rheumatology, but unfortunately couldn't get in for 6 months. We can assess her swollen lymph nodes at her upcomming visit with me in March and I will consider repeating an ultrasound, but we should avoid unnecessary radiation exposure.  I will defer to ENT regarding CT scan.  I agree with hematology that her enlarged lymph nodes are likely reactive from inflammation.  I reviewed her outside labs which are reassuring and overall her ESR is trending down..  - predniSONE (DELTASONE) 5 MG tablet; Take 1 tablet (5 mg) by mouth daily    Screening for diabetes mellitus  Will check A1C with chronic steroids and history of gestational diabetes.    - Hemoglobin A1c; Future    Review of external notes as documented elsewhere in note  Ordering of each unique test  Prescription drug management  40 minutes spent on the date of the encounter doing chart review, history and exam, documentation and further activities per the note           No follow-ups on file.    SASHA Arroyo [facility]    Subjective   Anahy is a 42 year old, presenting for the following health issues:  RECHECK (Continued symptoms, refill  prednisone)      History of Present Illness       Reason for visit:  Long Covid Mylgia days steriods suck    She eats 4 or more servings of fruits and vegetables daily.She consumes 0 sweetened beverage(s) daily.She exercises with enough effort to increase her heart rate 20 to 29 minutes per day.  She exercises with enough effort to increase her heart rate 3 or less days per week.   She is taking medications regularly.    Today's PHQ-9         PHQ-9 Total Score: 0    PHQ-9 Q9 Thoughts of better off dead/self-harm past 2 weeks :   Not at all    How difficult have these problems made it for you to do your work, take care of things at home, or get along with other people: Not difficult at all       [facility].  Also has [facility] to [facility].  Noticing neck is more swollen.  Saw hematologist, asked about lymphoma.  Hematologist does not think they are swollen enough to do a biopsy.  ENT also did not want to do any other biopsy with thyroid being removed in August.    On the days she doesn't take the prednisone, feels terrible.  Not 100% with the 10mg dose, but at least functional.  The days she doesn't take it, not working.Having fatigue, pain, myalgias, swelling is worse.  Feels winded easier, feeling stiffer with moving.        Not noticing a difference with the [place].          Review of Systems   Constitutional, HEENT, cardiovascular, pulmonary, gi and gu systems are negative, except as otherwise noted.      Objective    Vitals - Patient Reported  Systolic (Patient Reported): 131  Diastolic (Patient Reported): 83  Weight (Patient Reported): 79.8 kg (176 lb)  SpO2 (Patient Reported): 97  Pain Score: Severe Pain (6)        Physical Exam   GENERAL: Healthy, alert and no distress  EYES: Eyes grossly normal to inspection.  No discharge or erythema, or obvious scleral/conjunctival abnormalities.  RESP: No audible wheeze, cough, or visible cyanosis.  No visible retractions or increased work of  breathing.    SKIN: Visible skin clear. No significant rash, abnormal pigmentation or lesions.  NEURO: Cranial nerves grossly intact.  Mentation and speech appropriate for age.  PSYCH: Mentation appears normal, affect normal/bright, judgement and insight intact, normal speech and appearance well-groomed.                Video-Visit Details    Type of service:  Video Visit     Originating Location (pt. Location): Home    Distant Location (provider location):  Off-site  Platform used for Video Visit: Augusto

## 2023-03-01 ENCOUNTER — VIRTUAL VISIT (OUTPATIENT)
Dept: PHYSICAL MEDICINE AND REHAB | Facility: CLINIC | Age: 43
End: 2023-03-01
Attending: NURSE PRACTITIONER
Payer: COMMERCIAL

## 2023-03-01 DIAGNOSIS — U09.9 LONG COVID: ICD-10-CM

## 2023-03-01 DIAGNOSIS — S06.0X9A CONCUSSION WITH LOSS OF CONSCIOUSNESS, INITIAL ENCOUNTER: Primary | ICD-10-CM

## 2023-03-01 DIAGNOSIS — G43.009 MIGRAINE WITHOUT AURA AND WITHOUT STATUS MIGRAINOSUS, NOT INTRACTABLE: ICD-10-CM

## 2023-03-01 PROCEDURE — 99205 OFFICE O/P NEW HI 60 MIN: CPT | Mod: VID | Performed by: PHYSICAL MEDICINE & REHABILITATION

## 2023-03-01 RX ORDER — MODAFINIL 100 MG/1
100 TABLET ORAL DAILY
Qty: 30 TABLET | Refills: 3 | Status: SHIPPED | OUTPATIENT
Start: 2023-03-01 | End: 2023-04-06
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
## 2023-03-01 NOTE — NURSING NOTE
Is the patient currently in the state of [place]? YES    Visit mode:VIDEO    If the visit is dropped, the patient can be reconnected by: VIDEO VISIT: Text to cell phone: [phone]    Will anyone else be joining the visit? NO      How would you like to obtain your AVS? MyChart    Are changes needed to the allergy or medication list? NO    Reason for visit:     Pt. Has no comments or concerns for VF to relay to provider.     Patient expressed frustration during check-in and rooming process that she had completed e-check in and had participated in virtual visits before, disconnected the line before completing rooming process with VF.     Emerita Lucia on 3/1/2023 at 7:49 AM

## 2023-03-01 NOTE — TELEPHONE ENCOUNTER
See 2/28/23 virtual visit note.    No further action needed from triage.    DILEEP HamiltonN, RN-BC  [facility]

## 2023-03-02 ENCOUNTER — MYC MEDICAL ADVICE (OUTPATIENT)
Dept: FAMILY MEDICINE | Facility: CLINIC | Age: 43
End: 2023-03-02
Payer: COMMERCIAL

## 2023-03-02 DIAGNOSIS — D75.839 THROMBOCYTOSIS: ICD-10-CM

## 2023-03-02 DIAGNOSIS — R89.4 ABNORMAL IMMUNOLOGY FINDINGS: Primary | ICD-10-CM

## 2023-03-03 ENCOUNTER — HOSPITAL ENCOUNTER (OUTPATIENT)
Dept: PHYSICAL THERAPY | Facility: REHABILITATION | Age: 43
Discharge: HOME | End: 2023-03-03
Attending: PHYSICAL MEDICINE & REHABILITATION
Payer: COMMERCIAL

## 2023-03-03 DIAGNOSIS — U09.9 LONG COVID: Primary | ICD-10-CM

## 2023-03-03 PROCEDURE — 97162 PT EVAL MOD COMPLEX 30 MIN: CPT | Mod: GP | Performed by: PHYSICAL THERAPIST

## 2023-03-03 PROCEDURE — 97110 THERAPEUTIC EXERCISES: CPT | Mod: GP | Performed by: PHYSICAL THERAPIST

## 2023-03-03 NOTE — PROGRESS NOTES
Subjective: Had COVID in January of 2022 and since then has had continued pain and fatigue that she hasn't been able to reduce with anything other than steriods. Also gets relief with chiropractic care. Started a new medication for brain fog yesterday. She does exercises every day. Tries to walk at least once a week and feels exhausted. She is taking steroids every day to help with her pain. She will have some times where she has difficulty decreasing her heart rate after certain activities and her resting heart rate    Assessment: Anahy arrived to therapy 25 minutes late so the session was shorter. She presents to therapy with history of long COVID since January of 2022. She reports full body pain and fatigue that gets worse with activity. She also had difficulty with brain fog, which gets worse with the more activity she does. We spent time discussing the importance of energy conservation throughout her day in order to prevent excessive fatigue and brain fog. We also discussed the importance of regular stretching to help manage pain and body aches.    Therapeutic Exercise    Date 3/3/23   Exercise    [place] and arrow stretch education   LTR education                                               Ciro Huizar, PT, DPT, CMTPT/DN

## 2023-03-08 NOTE — TELEPHONE ENCOUNTER
Michelle: [facility] hematology referral pended    Did you send a referral to hematology with [facility] as well?  I previously saw hematology at [facility], and when I spoke with the [facility] clinic yesterday one of the items she would like before our next visit in 3 months is another follow-up with hematology as she is used to patients with my symptoms but they aren't actually showing the high CRP/Sed rate especially if on steroids.  She thought due to that the daily steriods (at no more than 10 so she thought trying 5 would be good) made sense until the reason for the high CRP/SED rates come apparent, so was hoping for hematology follow up (as thus then my platlets are high).  But I don't remember if you had sent for an [facility] hematology as its nice when [facility] providers can talk to each other quickly    NASRA Brito RN  [facility]

## 2023-03-09 NOTE — TELEPHONE ENCOUNTER
Pt notified of referral declined and e-consult recommended.    NASRA Brito RN  [facility]

## 2023-03-21 ENCOUNTER — MYC MEDICAL ADVICE (OUTPATIENT)
Dept: FAMILY MEDICINE | Facility: CLINIC | Age: 43
End: 2023-03-21

## 2023-03-24 ENCOUNTER — E-VISIT (OUTPATIENT)
Dept: FAMILY MEDICINE | Facility: CLINIC | Age: 43
End: 2023-03-24
Payer: COMMERCIAL

## 2023-03-24 DIAGNOSIS — R05.1 ACUTE COUGH: Primary | ICD-10-CM

## 2023-03-24 PROCEDURE — 99421 OL DIG E/M SVC 5-10 MIN: CPT | Performed by: NURSE PRACTITIONER

## 2023-03-24 RX ORDER — CODEINE PHOSPHATE AND GUAIFENESIN 10; 100 MG/5ML; MG/5ML
1-2 SOLUTION ORAL EVERY 4 HOURS PRN
Qty: 118 ML | Refills: 0 | Status: SHIPPED | OUTPATIENT
Start: 2023-03-24 | End: 2023-06-09

## 2023-03-24 NOTE — PATIENT INSTRUCTIONS
Dear Anahy Urena    After reviewing your responses, I've been able to diagnose you with likely an allergic cough. Cough due to allergy is caused by mucus from the back of your nose which can travel down the back of your throat and irritate your lungs. This causes swelling and irritation of air passages and causes a cough. Exposure to pollens or dust or cigarette smoke can worsen this.     To treat allergic cough, the main goal is to avoid the triggers if you know what they are and the can be avoided and to treat the nasal congestion that causes coughing. This can be done with allergy medications including antihistamine pills, nasal sprays, and decongestants, many of which are available over the counter.     I have sent Robitussin AC to the pharmacy you indicated.     If your symptoms worsen or are not improving in 4 days, please contact your primary care provider for an appointment or visit any of our convenient Walk-in Care or Urgent Care Centers to be seen which can be found on our website here.    Thanks again for choosing us as your health care partner,    SASHA Arroyo CNP

## 2023-03-27 NOTE — ADDENDUM NOTE
Encounter addended by: Ciro Huizar, PT on: 3/27/2023 10:21 AM   Actions taken: Episode resolved, Clinical Note Signed

## 2023-03-27 NOTE — PROGRESS NOTES
[facility]    Outpatient Physical Therapy Discharge Note  Patient: Anahy Urena  : 1980    Beginning/End Dates of Reporting Period:  3/3/23 to 3/3/23    Referring Provider: Maryann Cao MD    Therapy Diagnosis: Decreased activity tolerance, difficulty completing ADLs     Client Self Report: See note      Goals:  Goal Identifier HEP   Goal Description Anahy will be independent in their HEP in order to facilitate return to prior level of function.   Target Date 23   Date Met      Progress (detail required for progress note): In progress     Goal Identifier 6-MWT   Goal Description Anahy will demonstrate a decrease in pain and increase in function as measured by scoring >/= 1190 ft on the 6-MWT.   Target Date 23   Date Met      Progress (detail required for progress note): 1084 FT         Plan:  Discharge from therapy.    Discharge:    Reason for Discharge: Patient has failed to schedule further appointments.    Discharge Plan: Patient to continue home program.

## 2023-04-04 ENCOUNTER — LAB (OUTPATIENT)
Dept: LAB | Facility: CLINIC | Age: 43
End: 2023-04-04
Payer: COMMERCIAL

## 2023-04-04 DIAGNOSIS — E89.0 HISTORY OF PARTIAL THYROIDECTOMY: ICD-10-CM

## 2023-04-04 DIAGNOSIS — Z13.1 SCREENING FOR DIABETES MELLITUS: ICD-10-CM

## 2023-04-04 LAB
HBA1C MFR BLD: 5.3 % (ref 0–5.6)
TSH SERPL DL<=0.005 MIU/L-ACNC: 0.77 UIU/ML (ref 0.3–4.2)

## 2023-04-04 PROCEDURE — 36415 COLL VENOUS BLD VENIPUNCTURE: CPT

## 2023-04-04 PROCEDURE — 84443 ASSAY THYROID STIM HORMONE: CPT

## 2023-04-04 PROCEDURE — 83036 HEMOGLOBIN GLYCOSYLATED A1C: CPT

## 2023-04-06 ENCOUNTER — VIRTUAL VISIT (OUTPATIENT)
Dept: FAMILY MEDICINE | Facility: CLINIC | Age: 43
End: 2023-04-06
Payer: COMMERCIAL

## 2023-04-06 DIAGNOSIS — S06.0X9A CONCUSSION WITH LOSS OF CONSCIOUSNESS, INITIAL ENCOUNTER: ICD-10-CM

## 2023-04-06 DIAGNOSIS — U09.9 LONG COVID: Primary | ICD-10-CM

## 2023-04-06 DIAGNOSIS — M13.0 POLYARTHRITIS: ICD-10-CM

## 2023-04-06 DIAGNOSIS — E89.0 HISTORY OF PARTIAL THYROIDECTOMY: ICD-10-CM

## 2023-04-06 DIAGNOSIS — Z13.220 LIPID SCREENING: ICD-10-CM

## 2023-04-06 PROCEDURE — 99215 OFFICE O/P EST HI 40 MIN: CPT | Mod: VID | Performed by: NURSE PRACTITIONER

## 2023-04-06 RX ORDER — LEVOTHYROXINE SODIUM 75 UG/1
75 TABLET ORAL DAILY
Qty: 90 TABLET | Refills: 1 | Status: SHIPPED | OUTPATIENT
Start: 2023-04-06 | End: 2023-06-09 | Stop reason: DRUGHIGH

## 2023-04-06 RX ORDER — PREDNISONE 10 MG/1
10 TABLET ORAL DAILY
Qty: 14 TABLET | Refills: 0 | Status: SHIPPED | OUTPATIENT
Start: 2023-04-06 | End: 2023-06-09

## 2023-04-06 RX ORDER — PANTOPRAZOLE SODIUM 40 MG/1
TABLET, DELAYED RELEASE ORAL
COMMUNITY
Start: 2023-03-20 | End: 2023-05-22

## 2023-04-06 RX ORDER — FUROSEMIDE 20 MG
TABLET ORAL
COMMUNITY
Start: 2023-03-21 | End: 2023-06-09

## 2023-04-06 RX ORDER — MODAFINIL 200 MG/1
200 TABLET ORAL DAILY
Qty: 30 TABLET | Refills: 3 | Status: SHIPPED | OUTPATIENT
Start: 2023-04-06 | End: 2023-07-31

## 2023-04-06 RX ORDER — ACETAMINOPHEN AND CODEINE PHOSPHATE 120; 12 MG/5ML; MG/5ML
SOLUTION ORAL
COMMUNITY
Start: 2022-05-26 | End: 2023-06-01
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
## 2023-04-25 ENCOUNTER — HOSPITAL ENCOUNTER (OUTPATIENT)
Dept: OCCUPATIONAL THERAPY | Facility: REHABILITATION | Age: 43
Discharge: HOME | End: 2023-04-25
Payer: COMMERCIAL

## 2023-04-25 DIAGNOSIS — Z78.9 IMPAIRED INSTRUMENTAL ACTIVITIES OF DAILY LIVING: Primary | ICD-10-CM

## 2023-04-25 DIAGNOSIS — R41.840 INATTENTION: ICD-10-CM

## 2023-04-25 PROCEDURE — 97165 OT EVAL LOW COMPLEX 30 MIN: CPT | Mod: GO | Performed by: OCCUPATIONAL THERAPIST

## 2023-04-25 NOTE — PROGRESS NOTES
"   04/25/23 1100   Quick Adds   Type of Visit Initial Outpatient Occupational Therapy Evaluation   General Information   Start Of Care Date 04/25/23   Referring Physician Dr. Maryann Cao   Orders Evaluate and treat as indicated   Orders Date 03/01/23   Medical Diagnosis Long Covid   Onset of Illness/Injury or Date of Surgery 03/01/23  (date of referral to OT)   Precautions/Limitations Fall precautions   Surgical/Medical History Reviewed Yes   Additional Occupational Profile Info/Pertinent History of Current Problem Per neurology note in EMR on 3-1-23, \"Anahy Urena is a 42 year old female with a history of acute COVID in January 2022, who now presents for the first time in the long COVID clinic with long COVID syndrome of about 13 months duration.     Reviewing her chart thoroughly including the care everywhere it is clear that she had high inflammatory biomarkers even prior to the COVID infection.  This has worsened following the long COVID.\"                                       Patient had TBI in 2007 and states that Covid caused new issues as well as an exacerbation of previous TBI symptoms. Patient reports that her biggest concern is the brain fog. She further explains that she has difficulty with dual tasking, attention and word retrieval.   Role/Living Environment   Role/Living Environment Comments Patient lives with 8 and 10 year old daughter in a single family home. She is working full time as a CPA.   Pain   Patient currently in pain Yes   Pain location joint pain   Fall Risk Screen   Fall screen completed by OT   Have you fallen 2 or more times in the past year? Yes   Have you fallen and had an injury in the past year? Yes   Is patient a fall risk? Yes   Fall screen comments Patient declines additional PT   Abuse Screen (yes response referral indicated)   Feels Unsafe at Home or Work/School no   Feels Threatened by Someone no   Does Anyone Try to Keep You From Having Contact with Others or Doing " "Things Outside Your Home? no   Physical Signs of Abuse Present no   Patient needs abuse support services and resources No   Cognitive Status Examination   Cognitive Comment Patient reports that she had a TBI in 2007 and had neuropsych evaluation. She states that she was taken off of long term disability because her cogntive function was too high.   Visual Perception   Visual Perception Comments Patient reports no vision deficits but does have migraines 1-2 times a week and has \"black boxes and floaters\" with the migraine   Sensation   Sensation Comments Patient reports numbness and tingling in hands and feet.   Hand Strength   Hand Dominance Right   Transfer Skill   Level of [place]: Transfers independent   Bathing   Level of [place] - Bathing independent   Upper Body Dressing   Level of [place]: Dress Upper Body independent   Lower Body Dressing   Level of [place]: Dress Lower Body independent   Toileting   Level of [place]: Toilet independent   Grooming   Level of [place]: Grooming independent   Eating/Self-Feeding   Level of [place]: Eating independent   Instrumental Activities of Daily Living Assessment   IADL Assessment/Observations Patient reports that she is independent with all IADL's however at times she takes increased length of time to complete due to both physical and cognitive fatigue.   Planned Therapy Interventions   Planned Therapy Interventions Self care/Home management;Therapeutic activities    OT Goal 1   Goal Description Patient will demonstrate the ability to complete 2-3 tasks simultaneously with distractions, with 90% accuracy and little to no difficulty, for increased concentration and attention skills to improve her ability to participate in home tasks   Target Date 06/24/23    OT Goal 2   Goal Description Patient will verbalize understanding of memory compensation strategies and activities to increase cognitive function for improved memory and cognitive " skills for increased ADL/IADL independence.   Target Date 06/24/23    OT Goal 3   Goal Description Pt will demonstrate visual skills, reaction time, Anthony coordination, attention, and cognition WNL for increased ease of functional and community mobility tasks (navigating new environments, multi-tasking during IADL's) by scoring WNLs on 5/5 IADL tasks (e.g. Dynavision, Scan course, SDMT, trail-making, WCPA).   Target Date 06/24/23   Clinical Impression   Criteria for Skilled Therapeutic Interventions Met Yes, treatment indicated   OT Diagnosis change in cognition, impaired ADL and IADL function   Clinical Decision Making (Complexity) Low complexity   Therapy Frequency once a week   Predicted Duration of Therapy Intervention (days/wks) 12 weeks   Risks and Benefits of Treatment have been explained. Yes   Patient, Family & other staff in agreement with plan of care Yes   Clinical Impression Comments Patient has Long Covid symptoms and would benefit from OT to work on divided attention, memory changes and sound sensitivity. Patient has been to therapy in the past (TBI in 2007) and had success with symptom relief.   Education Assessment   Barriers To Learning No Barriers   Total Evaluation Time   OT Salvador Low Complexity Minutes (90568) 35

## 2023-05-01 DIAGNOSIS — M79.7 FIBROMYALGIA: ICD-10-CM

## 2023-05-01 DIAGNOSIS — G93.32 CHRONIC FATIGUE SYNDROME: ICD-10-CM

## 2023-05-01 DIAGNOSIS — U09.9 LONG COVID: ICD-10-CM

## 2023-05-01 RX ORDER — PREDNISONE 5 MG/1
TABLET ORAL
Qty: 60 TABLET | Refills: 0 | Status: SHIPPED | OUTPATIENT
Start: 2023-05-01 | End: 2023-06-01

## 2023-05-08 ENCOUNTER — MYC MEDICAL ADVICE (OUTPATIENT)
Dept: FAMILY MEDICINE | Facility: CLINIC | Age: 43
End: 2023-05-08
Payer: COMMERCIAL

## 2023-05-22 ENCOUNTER — LAB (OUTPATIENT)
Dept: LAB | Facility: CLINIC | Age: 43
End: 2023-05-22
Payer: COMMERCIAL

## 2023-05-22 ENCOUNTER — MYC REFILL (OUTPATIENT)
Dept: FAMILY MEDICINE | Facility: CLINIC | Age: 43
End: 2023-05-22

## 2023-05-22 DIAGNOSIS — K21.9 CHRONIC GERD: Primary | ICD-10-CM

## 2023-05-22 DIAGNOSIS — E89.0 HISTORY OF PARTIAL THYROIDECTOMY: ICD-10-CM

## 2023-05-22 LAB — TSH SERPL DL<=0.005 MIU/L-ACNC: 0.69 UIU/ML (ref 0.3–4.2)

## 2023-05-22 PROCEDURE — 84443 ASSAY THYROID STIM HORMONE: CPT

## 2023-05-22 PROCEDURE — 36415 COLL VENOUS BLD VENIPUNCTURE: CPT

## 2023-05-23 RX ORDER — PANTOPRAZOLE SODIUM 40 MG/1
40 TABLET, DELAYED RELEASE ORAL DAILY
Qty: 90 TABLET | Refills: 0 | Status: SHIPPED | OUTPATIENT
Start: 2023-05-23 | End: 2023-08-17

## 2023-05-23 NOTE — TELEPHONE ENCOUNTER
Michelle/Covering providers-please review, sign if agree and may close encounter.    Routing refill request to provider for review/approval because:  Medication is reported/historical    Patient's last visit was 4/6/23 with SUE Munoz CNP    Thank you!  DILEEP HamiltonN, RN-BC  [facility]

## 2023-05-24 SDOH — SOCIAL STABILITY: SOCIAL NETWORK: I HAVE TROUBLE DOING ALL OF MY REGULAR LEISURE ACTIVITIES WITH OTHERS: NEVER

## 2023-05-24 SDOH — SOCIAL STABILITY: SOCIAL NETWORK: I HAVE TROUBLE DOING ALL OF THE FAMILY ACTIVITIES THAT I WANT TO DO: RARELY

## 2023-05-24 SDOH — SOCIAL STABILITY: SOCIAL NETWORK: I HAVE TROUBLE DOING ALL OF THE ACTIVITIES WITH FRIENDS THAT I WANT TO DO: SOMETIMES

## 2023-05-24 SDOH — SOCIAL STABILITY: SOCIAL NETWORK

## 2023-05-24 SDOH — SOCIAL STABILITY: SOCIAL NETWORK: I HAVE TROUBLE DOING ALL OF MY USUAL WORK (INCLUDE WORK AT HOME): SOMETIMES

## 2023-05-24 SDOH — SOCIAL STABILITY: SOCIAL NETWORK: PROMIS ABILITY TO PARTICIPATE IN SOCIAL ROLES & ACTIVITIES T-SCORE: 50

## 2023-05-24 ASSESSMENT — ENCOUNTER SYMPTOMS
CHILLS: 1
FATIGUE: 1
BLOOD IN STOOL: 0
JAUNDICE: 0
EYE PAIN: 0
EYE IRRITATION: 1
POSTURAL DYSPNEA: 0
DIARRHEA: 0
ALTERED TEMPERATURE REGULATION: 1
SINUS CONGESTION: 1
STIFFNESS: 1
SPUTUM PRODUCTION: 1
BOWEL INCONTINENCE: 0
INCREASED ENERGY: 1
BLOATING: 1
POLYDIPSIA: 0
HEMOPTYSIS: 0
LEG PAIN: 0
NAUSEA: 1
NUMBNESS: 1
HEADACHES: 1
JOINT SWELLING: 1
SYNCOPE: 0
NAIL CHANGES: 1
SPEECH CHANGE: 0
POOR WOUND HEALING: 0
DOUBLE VISION: 0
TASTE DISTURBANCE: 0
MYALGIAS: 1
SKIN CHANGES: 0
RECTAL PAIN: 0
MUSCLE WEAKNESS: 1
HYPOTENSION: 0
SMELL DISTURBANCE: 0
ARTHRALGIAS: 1
DIZZINESS: 1
WEIGHT GAIN: 1
SWOLLEN GLANDS: 1
DISTURBANCES IN COORDINATION: 1
HYPERTENSION: 1
PALPITATIONS: 1
NECK PAIN: 1
HOARSE VOICE: 1
DECREASED APPETITE: 1
LOSS OF CONSCIOUSNESS: 0
HALLUCINATIONS: 0
HEARTBURN: 1
EYE WATERING: 0
ABDOMINAL PAIN: 1
TREMORS: 0
EXERCISE INTOLERANCE: 1
SLEEP DISTURBANCES DUE TO BREATHING: 0
HOT FLASHES: 0
EYE REDNESS: 0
VOMITING: 0
TROUBLE SWALLOWING: 1
SHORTNESS OF BREATH: 1
DECREASED LIBIDO: 0
NIGHT SWEATS: 1
LIGHT-HEADEDNESS: 1
COUGH DISTURBING SLEEP: 1
POLYPHAGIA: 0
WEAKNESS: 1
WEIGHT LOSS: 0
MEMORY LOSS: 1
COUGH: 1
FEVER: 0
SORE THROAT: 1
NECK MASS: 1
SNORES LOUDLY: 0
SEIZURES: 0
DYSPNEA ON EXERTION: 1
ORTHOPNEA: 0
MUSCLE CRAMPS: 1
SINUS PAIN: 1
PARALYSIS: 0
TINGLING: 1
BRUISES/BLEEDS EASILY: 1
BACK PAIN: 1
CONSTIPATION: 0
WHEEZING: 1

## 2023-05-24 ASSESSMENT — ANXIETY QUESTIONNAIRES
8. IF YOU CHECKED OFF ANY PROBLEMS, HOW DIFFICULT HAVE THESE MADE IT FOR YOU TO DO YOUR WORK, TAKE CARE OF THINGS AT HOME, OR GET ALONG WITH OTHER PEOPLE?: NOT DIFFICULT AT ALL
6. BECOMING EASILY ANNOYED OR IRRITABLE: NOT AT ALL
GAD7 TOTAL SCORE: 2
4. TROUBLE RELAXING: SEVERAL DAYS
3. WORRYING TOO MUCH ABOUT DIFFERENT THINGS: NOT AT ALL
GAD7 TOTAL SCORE: 2
5. BEING SO RESTLESS THAT IT IS HARD TO SIT STILL: NOT AT ALL
GAD7 TOTAL SCORE: 2
7. FEELING AFRAID AS IF SOMETHING AWFUL MIGHT HAPPEN: NOT AT ALL
2. NOT BEING ABLE TO STOP OR CONTROL WORRYING: NOT AT ALL
IF YOU CHECKED OFF ANY PROBLEMS ON THIS QUESTIONNAIRE, HOW DIFFICULT HAVE THESE PROBLEMS MADE IT FOR YOU TO DO YOUR WORK, TAKE CARE OF THINGS AT HOME, OR GET ALONG WITH OTHER PEOPLE: NOT DIFFICULT AT ALL
7. FEELING AFRAID AS IF SOMETHING AWFUL MIGHT HAPPEN: NOT AT ALL
1. FEELING NERVOUS, ANXIOUS, OR ON EDGE: SEVERAL DAYS

## 2023-05-24 ASSESSMENT — PATIENT HEALTH QUESTIONNAIRE - PHQ9
SUM OF ALL RESPONSES TO PHQ QUESTIONS 1-9: 0
10. IF YOU CHECKED OFF ANY PROBLEMS, HOW DIFFICULT HAVE THESE PROBLEMS MADE IT FOR YOU TO DO YOUR WORK, TAKE CARE OF THINGS AT HOME, OR GET ALONG WITH OTHER PEOPLE: NOT DIFFICULT AT ALL
SUM OF ALL RESPONSES TO PHQ QUESTIONS 1-9: 0

## 2023-05-30 ENCOUNTER — TELEPHONE (OUTPATIENT)
Dept: FAMILY MEDICINE | Facility: CLINIC | Age: 43
End: 2023-05-30
Payer: COMMERCIAL

## 2023-05-30 NOTE — TELEPHONE ENCOUNTER
Reason for Call:  Appointment Request    Patient requesting this type of appt: Video appointment to discuss inflammation and BP    Requested provider: Kelsie Griffith    Reason patient unable to be scheduled: Not within requested timeframe    When does patient want to be seen/preferred time: 1-2 weeks    Comments: Patient is frustrated that her appointment with Kelsie Griffith has needed to be rescheduled twice. She prefers to see Kelsie and would like a call back to discuss being fit onto her schedule as soon as she returns.    Could we send this information to you in [place] or would you prefer to receive a phone call?:   Patient would prefer a phone call   Okay to leave a detailed message?: Yes at Cell number on file:    Telephone Information:   Mobile [phone]       Call taken on 5/30/2023 at 9:14 AM by Hugo Griffin

## 2023-05-31 ENCOUNTER — VIRTUAL VISIT (OUTPATIENT)
Dept: PHYSICAL MEDICINE AND REHAB | Facility: CLINIC | Age: 43
End: 2023-05-31
Payer: COMMERCIAL

## 2023-05-31 ENCOUNTER — TELEPHONE (OUTPATIENT)
Dept: PHYSICAL MEDICINE AND REHAB | Facility: CLINIC | Age: 43
End: 2023-05-31

## 2023-05-31 DIAGNOSIS — M79.7 FIBROMYALGIA: ICD-10-CM

## 2023-05-31 DIAGNOSIS — S06.0X9A CONCUSSION WITH LOSS OF CONSCIOUSNESS, INITIAL ENCOUNTER: Primary | ICD-10-CM

## 2023-05-31 DIAGNOSIS — U09.9 LONG COVID: ICD-10-CM

## 2023-05-31 DIAGNOSIS — G93.32 CHRONIC FATIGUE SYNDROME: ICD-10-CM

## 2023-05-31 PROCEDURE — 99214 OFFICE O/P EST MOD 30 MIN: CPT | Mod: VID | Performed by: PHYSICAL MEDICINE & REHABILITATION

## 2023-05-31 RX ORDER — AMANTADINE HYDROCHLORIDE 100 MG/1
100 CAPSULE, GELATIN COATED ORAL 2 TIMES DAILY
Qty: 60 CAPSULE | Refills: 1 | Status: SHIPPED | OUTPATIENT
Start: 2023-05-31 | End: 2023-08-17 | Stop reason: DRUGHIGH

## 2023-05-31 ASSESSMENT — ENCOUNTER SYMPTOMS
COUGH: 1
SHORTNESS OF BREATH: 1
FEVER: 0
WHEEZING: 1
JOINT SWELLING: 1
HALLUCINATIONS: 0
NECK PAIN: 1
BACK PAIN: 1
POLYPHAGIA: 0
PALPITATIONS: 1
NUMBNESS: 1
SORE THROAT: 1
VOMITING: 0
CHILLS: 1
CONSTIPATION: 0
NAUSEA: 1
MYALGIAS: 1
SINUS PAIN: 1
BRUISES/BLEEDS EASILY: 1
EYE REDNESS: 0
TROUBLE SWALLOWING: 1
TREMORS: 0
HEARTBURN: 1
DIARRHEA: 0
ABDOMINAL PAIN: 1
FATIGUE: 1
SEIZURES: 0
RECTAL PAIN: 0
LIGHT-HEADEDNESS: 1
WEAKNESS: 1
DIZZINESS: 1
EYE PAIN: 0
ARTHRALGIAS: 1
POLYDIPSIA: 0
HEADACHES: 1

## 2023-05-31 NOTE — LETTER
5/31/2023       RE: Anahy Urena  [address] [address]       Dear Colleague,    Thank you for referring your patient, Anahy Urena, to the [facility] PHYSICAL MEDICINE AND REHABILITATION CLINIC [place] at [facility]. Please see a copy of my visit note below.    Assessment and plan   Anahy Urena is a 43 year old female with past medical history of traumatic brain injury/concussion, depression anxiety who presents with fatigue dominant long COVID syndrome for ongoing recommendations for management.    Fatigue/PEM:   Fatigue continues to be a primary symptom for Anahy impacting all aspects of her life.  She is very frustrated with the lack of her health, additional needed surgery for ovarian cyst. She is also on steroids, at 5 mg daily.   Discussed amantadine as an option for the next 3 months.: discussed with the patient side effect profile and drug interaction with modafinil.  There is 1 interaction noted between modafinil and amantadine which is noted to be increased irritability nervousness, seizures and cardiac arrhythmias.  I discussed with the patient the various side effects possible with the interaction, she would like to start the trial of taking the amantadine.  I have given her a prescription for the next 3 months when I will reassess.  She was asked to hold off the medication if she notices any of the side effects appearing.  She assures me that her mother is around and will be very helpful in monitoring her    Brain fog;   She notes that she has a good response to modafinil and that her primary care physician increase the dose to 200 mg.  Will recommend continue for now.    Mental health   She notes that there are moments of anxiety   She does have lorazepam and clonazepam both of which help. She had serious dizziness with Neurontin.  We discussed conservative options and other possibilities.  As she is showing  improvement, would recommend no change for now.     Maryann Cao MD, [facility]   Department of Rehabilitation       HPI   Current Symptoms:  Brain fog: modafinil helps her, and her PCP did increase the dose to 200 mg which is helping.   She has additional fatigue which is very limiting. She has PEM which impacts her function.   She is very frustrated   She did also go to [facility] and she was frustrated about her lack of options. She was also diagnosed with ovarian cyst, and she will be undergoing resection of the mass. She has had some weight gain adding 60 lbs.   She is frustrated with the weight gain. She states that she is not hungry.   She is unable to go to trips or to the zoo with her daughters.     She did take the steroids at 5 mg daily for the last 3 months, she did see some response.     Her last ESR was 24 3 months ago and high. She would benefit from a repeat test.     Anxiety               5/24/2023     9:05 AM   PHQ Assesment Total Score(s)   PHQ-9 Score 0         5/24/2023     9:05 AM   YONY-7 Results   YONY 7 TOTAL SCORE 2 (minimal anxiety)   YONY-7 Total Score 2         5/24/2023     9:05 AM   PTSD Screen Score   Have you ever experienced this kind of event? Yes   PTSD Screen (Score of 3 or more suggests positive screen) 0         5/24/2023     9:13 AM   PROMIS-29   PROMIS Physical Function T-Score 33 (moderate dysfunction)   PROMIS Anxiety T-Score 48 (within normal limits)   PROMIS Depression T-Score 41 (within normal limits)   PROMIS Fatigue T-Score 57 (mild)   PROMIS Sleep Disturbance T-Score 52 (within normal limits)   PROMIS Ability to Participate in Social Roles & Activities T-Score 50 (within normal limits)   PROMIS Pain Interference T-Score 61 (moderate)   PROMIS Pain Intensity 6         Past Medical History:   Diagnosis Date    Anemia     Anxiety     Arthritis 1/15/2022    RA is negative    Asthma     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    Endometriosis     Hypertension  5/15/2022    Major depression, single episode 2/7/2023    PCOS (polycystic ovarian syndrome)     Thyroid disease 8/15/2022    cancer and follow-up       Past Surgical History:   Procedure Laterality Date    ABDOMEN SURGERY  2001, 2020    laparoscopy on ovaries    ANKLE SURGERY      BIOPSY  neck 1999    BRACHIAL PLEXUS EXPLORATION      ENT SURGERY  1999    neck tumor    EYE SURGERY  2015    GYN SURGERY      HEAD & NECK SURGERY  1999    LAPAROSCOPIC ENDOMETRIOSIS FULGURATION      ORTHOPEDIC SURGERY  2019, 2022    [facility] HYSTEROSCOPY,W/ENDO BX N/A 11/30/2020    Procedure: HYSTEROSCOPY WITH DILATION AND CURETTAGE, CERVICAL POLYPECTOMY AND PLACEMENT OF MIRENA INTRAUTERINE DEVICE;  Surgeon: Jossy Rodriguez MD;  Location: [facility];  Service: Gynecology    TEAR DUCT SURGERY         No family history on file.    Social History     Tobacco Use    Smoking status: Never    Smokeless tobacco: Never   Vaping Use    Vaping status: Never Used   Substance Use Topics    Alcohol use: Yes    Drug use: Never         Current Outpatient Medications:     albuterol (PROAIR HFA;PROVENTIL HFA;VENTOLIN HFA) 90 mcg/actuation inhaler, Inhale 2 puffs into the lungs every 6 hours as needed , Disp: , Rfl:     furosemide (LASIX) 20 MG tablet, , Disp: , Rfl:     guaiFENesin-codeine (ROBITUSSIN AC) 100-10 MG/5ML solution, Take 5-10 mLs by mouth every 4 hours as needed for cough, Disp: 118 mL, Rfl: 0    levothyroxine (SYNTHROID/LEVOTHROID) 75 MCG tablet, Take 1 tablet (75 mcg) by mouth daily, Disp: 90 tablet, Rfl: 1    loratadine-pseudoePHEDrine (CLARITIN-D 24 HOUR)  MG 24 hr tablet, Take 1 tablet by mouth daily, Disp: , Rfl:     LORazepam (ATIVAN) 1 MG tablet, Take 1 mg by mouth daily as needed for anxiety , Disp: , Rfl:     modafinil (PROVIGIL) 200 MG tablet, Take 1 tablet (200 mg) by mouth daily, Disp: 30 tablet, Rfl: 3    norethin-eth estradiol-fe (GILDESS 24 FE) 1-20 MG-MCG(24) tablet, Take 1 tablet by mouth daily, Disp: , Rfl:     " norethindrone (VA) 0.35 MG tablet, 1 tablet Orally Once a day for 28 days, Disp: , Rfl:     pantoprazole (PROTONIX) 40 MG EC tablet, Take 1 tablet (40 mg) by mouth daily Further refills per PCP, Disp: 90 tablet, Rfl: 0    predniSONE (DELTASONE) 10 MG tablet, Take 1 tablet (10 mg) by mouth daily, Disp: 14 tablet, Rfl: 0    predniSONE (DELTASONE) 5 MG tablet, Take 1 Tablet (5 mg) by mouth once daily., Disp: 60 tablet, Rfl: 0      Review of Systems   Constitutional: Positive for chills and fatigue. Negative for fever.   HENT: Positive for ear pain, sinus pain, sore throat, tinnitus and trouble swallowing. Negative for ear discharge, hearing loss, mouth sores and nosebleeds.    Eyes: Negative for pain and redness.   Respiratory: Positive for cough, shortness of breath and wheezing.    Cardiovascular: Positive for palpitations. Negative for chest pain.   Gastrointestinal: Positive for abdominal pain, heartburn and nausea. Negative for constipation, diarrhea, rectal pain and vomiting.   Endocrine: Negative for polydipsia and polyphagia.   Genitourinary: Negative for dyspareunia, genital sores and vaginal discharge.   Musculoskeletal: Positive for arthralgias, back pain, joint swelling, myalgias and neck pain.   Skin: Negative for rash.   Neurological: Positive for dizziness, weakness, light-headedness, numbness and headaches. Negative for tremors and seizures.   Hematological: Bruises/bleeds easily.   Psychiatric/Behavioral: Negative for hallucinations.           Objective    Vitals - Patient Reported  Weight (Patient Reported): 79.4 kg (175 lb)  Height (Patient Reported): 165.1 cm (5' 5\")  BMI (Based on Pt Reported Ht/Wt): 29.12  Pain Score: Extreme Pain (9)  Pain Loc:  (aches and pains.)          Again, thank you for allowing me to participate in the care of your patient.      Sincerely,    Maryann Cao MD      "

## 2023-05-31 NOTE — PROGRESS NOTES
Assessment and plan   Anahy Urena is a 43 year old female with past medical history of traumatic brain injury/concussion, depression anxiety who presents with fatigue dominant long COVID syndrome for ongoing recommendations for management.    Fatigue/PEM:   Fatigue continues to be a primary symptom for Anahy impacting all aspects of her life.  She is very frustrated with the lack of her health, additional needed surgery for ovarian cyst. She is also on steroids, at 5 mg daily.   Discussed amantadine as an option for the next 3 months.: discussed with the patient side effect profile and drug interaction with modafinil.  There is 1 interaction noted between modafinil and amantadine which is noted to be increased irritability nervousness, seizures and cardiac arrhythmias.  I discussed with the patient the various side effects possible with the interaction, she would like to start the trial of taking the amantadine.  I have given her a prescription for the next 3 months when I will reassess.  She was asked to hold off the medication if she notices any of the side effects appearing.  She assures me that her mother is around and will be very helpful in monitoring her    Brain fog;   She notes that she has a good response to modafinil and that her primary care physician increase the dose to 200 mg.  Will recommend continue for now.    Mental health   She notes that there are moments of anxiety   She does have lorazepam and clonazepam both of which help. She had serious dizziness with Neurontin.  We discussed conservative options and other possibilities.  As she is showing improvement, would recommend no change for now.     Maryann Cao MD, [facility]   Department of Rehabilitation       HPI   Current Symptoms:  Brain fog: modafinil helps her, and her PCP did increase the dose to 200 mg which is helping.   She has additional fatigue which is very limiting. She has PEM which impacts her function.   She is very frustrated    She did also go to [facility] and she was frustrated about her lack of options. She was also diagnosed with ovarian cyst, and she will be undergoing resection of the mass. She has had some weight gain adding 60 lbs.   She is frustrated with the weight gain. She states that she is not hungry.   She is unable to go to trips or to the zoo with her daughters.     She did take the steroids at 5 mg daily for the last 3 months, she did see some response.     Her last ESR was 24 3 months ago and high. She would benefit from a repeat test.     Anxiety               5/24/2023     9:05 AM   PHQ Assesment Total Score(s)   PHQ-9 Score 0         5/24/2023     9:05 AM   YONY-7 Results   YONY 7 TOTAL SCORE 2 (minimal anxiety)   YONY-7 Total Score 2         5/24/2023     9:05 AM   PTSD Screen Score   Have you ever experienced this kind of event? Yes   PTSD Screen (Score of 3 or more suggests positive screen) 0         5/24/2023     9:13 AM   PROMIS-29   PROMIS Physical Function T-Score 33 (moderate dysfunction)   PROMIS Anxiety T-Score 48 (within normal limits)   PROMIS Depression T-Score 41 (within normal limits)   PROMIS Fatigue T-Score 57 (mild)   PROMIS Sleep Disturbance T-Score 52 (within normal limits)   PROMIS Ability to Participate in Social Roles & Activities T-Score 50 (within normal limits)   PROMIS Pain Interference T-Score 61 (moderate)   PROMIS Pain Intensity 6         Past Medical History:   Diagnosis Date     Anemia      Anxiety      Arthritis 1/15/2022    RA is negative     Asthma      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     Endometriosis      Hypertension 5/15/2022     Major depression, single episode 2/7/2023     PCOS (polycystic ovarian syndrome)      Thyroid disease 8/15/2022    cancer and follow-up       Past Surgical History:   Procedure Laterality Date     ABDOMEN SURGERY  2001, 2020    laparoscopy on ovaries     ANKLE SURGERY       BIOPSY  neck 1999     BRACHIAL PLEXUS EXPLORATION       ENT  SURGERY  1999    neck tumor     EYE SURGERY  2015     GYN SURGERY       HEAD & NECK SURGERY  1999     LAPAROSCOPIC ENDOMETRIOSIS FULGURATION       ORTHOPEDIC SURGERY  2019, 2022     [facility] HYSTEROSCOPY,W/ENDO BX N/A 11/30/2020    Procedure: HYSTEROSCOPY WITH DILATION AND CURETTAGE, CERVICAL POLYPECTOMY AND PLACEMENT OF MIRENA INTRAUTERINE DEVICE;  Surgeon: Jossy Rodriguez MD;  Location: [facility];  Service: Gynecology     TEAR DUCT SURGERY         No family history on file.    Social History     Tobacco Use     Smoking status: Never     Smokeless tobacco: Never   Vaping Use     Vaping status: Never Used   Substance Use Topics     Alcohol use: Yes     Drug use: Never         Current Outpatient Medications:      albuterol (PROAIR HFA;PROVENTIL HFA;VENTOLIN HFA) 90 mcg/actuation inhaler, Inhale 2 puffs into the lungs every 6 hours as needed , Disp: , Rfl:      furosemide (LASIX) 20 MG tablet, , Disp: , Rfl:      guaiFENesin-codeine (ROBITUSSIN AC) 100-10 MG/5ML solution, Take 5-10 mLs by mouth every 4 hours as needed for cough, Disp: 118 mL, Rfl: 0     levothyroxine (SYNTHROID/LEVOTHROID) 75 MCG tablet, Take 1 tablet (75 mcg) by mouth daily, Disp: 90 tablet, Rfl: 1     loratadine-pseudoePHEDrine (CLARITIN-D 24 HOUR)  MG 24 hr tablet, Take 1 tablet by mouth daily, Disp: , Rfl:      LORazepam (ATIVAN) 1 MG tablet, Take 1 mg by mouth daily as needed for anxiety , Disp: , Rfl:      modafinil (PROVIGIL) 200 MG tablet, Take 1 tablet (200 mg) by mouth daily, Disp: 30 tablet, Rfl: 3     norethin-eth estradiol-fe (GILDESS 24 FE) 1-20 MG-MCG(24) tablet, Take 1 tablet by mouth daily, Disp: , Rfl:      norethindrone (VA) 0.35 MG tablet, 1 tablet Orally Once a day for 28 days, Disp: , Rfl:      pantoprazole (PROTONIX) 40 MG EC tablet, Take 1 tablet (40 mg) by mouth daily Further refills per PCP, Disp: 90 tablet, Rfl: 0     predniSONE (DELTASONE) 10 MG tablet, Take 1 tablet (10 mg) by mouth daily, Disp: 14  "tablet, Rfl: 0     predniSONE (DELTASONE) 5 MG tablet, Take 1 Tablet (5 mg) by mouth once daily., Disp: 60 tablet, Rfl: 0      Review of Systems   Constitutional: Positive for chills and fatigue. Negative for fever.   HENT: Positive for ear pain, sinus pain, sore throat, tinnitus and trouble swallowing. Negative for ear discharge, hearing loss, mouth sores and nosebleeds.    Eyes: Negative for pain and redness.   Respiratory: Positive for cough, shortness of breath and wheezing.    Cardiovascular: Positive for palpitations. Negative for chest pain.   Gastrointestinal: Positive for abdominal pain, heartburn and nausea. Negative for constipation, diarrhea, rectal pain and vomiting.   Endocrine: Negative for polydipsia and polyphagia.   Genitourinary: Negative for dyspareunia, genital sores and vaginal discharge.   Musculoskeletal: Positive for arthralgias, back pain, joint swelling, myalgias and neck pain.   Skin: Negative for rash.   Neurological: Positive for dizziness, weakness, light-headedness, numbness and headaches. Negative for tremors and seizures.   Hematological: Bruises/bleeds easily.   Psychiatric/Behavioral: Negative for hallucinations.            Objective    Vitals - Patient Reported  Weight (Patient Reported): 79.4 kg (175 lb)  Height (Patient Reported): 165.1 cm (5' 5\")  BMI (Based on Pt Reported Ht/Wt): 29.12  Pain Score: Extreme Pain (9)  Pain Loc:  (aches and pains.)      Video-Visit Details  Video Start Time: 11:38 AM  Video End Time:12:08 PM    Type of service:  Video Visit     Originating Location (pt. Location): Home    Distant Location (provider location):  Off-site  Platform used for Video Visit: Augusto"

## 2023-05-31 NOTE — TELEPHONE ENCOUNTER
Pt scheduled for 3 mo follow up with K[facility] de [facility] and rehab.  Pt will schedule labs on own.  Camille Jackman

## 2023-05-31 NOTE — NURSING NOTE
Is the patient currently in the state of [place]? YES    Visit mode:VIDEO    If the visit is dropped, the patient can be reconnected by: VIDEO VISIT: Text to cell phone: [phone]    Will anyone else be joining the visit? NO      How would you like to obtain your AVS? MyChart    Are changes needed to the allergy or medication list? YES: Pt stated she did the echeck in and doesn't want to review.    Reason for visit: Follow Up    Date of pt reported vitals: this am  Pain: no  Camille Jackman     Pt expressed frustration about the check in process we have with virtual appointments.  She is frustrated that she goes over everything with echeck in , VF, and then provider.  She did express frustration with the numerous reminders too.  She was offered pt relations phone number to express frustration and concerns, but refused to take that number down.

## 2023-06-01 RX ORDER — PREDNISONE 5 MG/1
TABLET ORAL
Qty: 60 TABLET | Refills: 0 | Status: SHIPPED | OUTPATIENT
Start: 2023-06-01 | End: 2023-06-09

## 2023-06-01 NOTE — TELEPHONE ENCOUNTER
Would like to discuss continuation of this medication with the patient.  Hopefull we can titrate off of it with the increased modafinil at some point.  Please have patient schedule follow up for any additional refills.

## 2023-06-02 NOTE — TELEPHONE ENCOUNTER
LVM, regarding med refill. Advice pt to call and schedule a f/u appt due to dr requested on discussion of med further use.

## 2023-06-09 ENCOUNTER — VIRTUAL VISIT (OUTPATIENT)
Dept: FAMILY MEDICINE | Facility: CLINIC | Age: 43
End: 2023-06-09
Payer: COMMERCIAL

## 2023-06-09 DIAGNOSIS — M79.10 MYALGIA: ICD-10-CM

## 2023-06-09 DIAGNOSIS — I10 PRIMARY HYPERTENSION: ICD-10-CM

## 2023-06-09 DIAGNOSIS — C73 MALIGNANT NEOPLASM OF THYROID GLAND (H): ICD-10-CM

## 2023-06-09 DIAGNOSIS — R05.1 ACUTE COUGH: ICD-10-CM

## 2023-06-09 DIAGNOSIS — D75.839 THROMBOCYTOSIS, UNSPECIFIED: ICD-10-CM

## 2023-06-09 DIAGNOSIS — G43.709 CHRONIC MIGRAINE WITHOUT AURA WITHOUT STATUS MIGRAINOSUS, NOT INTRACTABLE: ICD-10-CM

## 2023-06-09 DIAGNOSIS — U09.9 LONG COVID: Primary | ICD-10-CM

## 2023-06-09 DIAGNOSIS — G93.32 CHRONIC FATIGUE SYNDROME: ICD-10-CM

## 2023-06-09 DIAGNOSIS — M79.7 FIBROMYALGIA: ICD-10-CM

## 2023-06-09 PROCEDURE — 99214 OFFICE O/P EST MOD 30 MIN: CPT | Mod: 95 | Performed by: NURSE PRACTITIONER

## 2023-06-09 RX ORDER — LORAZEPAM 1 MG/1
1 TABLET ORAL DAILY PRN
Qty: 30 TABLET | Refills: 5 | Status: SHIPPED | OUTPATIENT
Start: 2023-06-09 | End: 2023-08-17

## 2023-06-09 RX ORDER — LEVOTHYROXINE SODIUM 100 UG/1
100 TABLET ORAL DAILY
Qty: 90 TABLET | Refills: 1 | Status: SHIPPED | OUTPATIENT
Start: 2023-06-09 | End: 2023-11-20

## 2023-06-09 RX ORDER — ELETRIPTAN HYDROBROMIDE 20 MG/1
20-40 TABLET, FILM COATED ORAL
Qty: 30 TABLET | Refills: 3 | Status: SHIPPED | OUTPATIENT
Start: 2023-06-09

## 2023-06-09 RX ORDER — PREDNISONE 5 MG/1
5 TABLET ORAL DAILY
Qty: 60 TABLET | Refills: 3 | Status: SHIPPED | OUTPATIENT
Start: 2023-06-09 | End: 2023-08-17

## 2023-06-09 RX ORDER — CODEINE PHOSPHATE AND GUAIFENESIN 10; 100 MG/5ML; MG/5ML
1-2 SOLUTION ORAL EVERY 4 HOURS PRN
Qty: 118 ML | Refills: 5 | Status: SHIPPED | OUTPATIENT
Start: 2023-06-09 | End: 2023-07-17

## 2023-06-09 RX ORDER — FUROSEMIDE 20 MG
TABLET ORAL
Qty: 270 TABLET | Refills: 3 | Status: SHIPPED | OUTPATIENT
Start: 2023-06-09 | End: 2024-05-15

## 2023-06-09 RX ORDER — OXYCODONE HYDROCHLORIDE 5 MG/1
TABLET ORAL
COMMUNITY
Start: 2023-06-08 | End: 2023-08-17

## 2023-06-09 NOTE — PROGRESS NOTES
Anahy is a 43 year old who is being evaluated via a billable video visit.      How would you like to obtain your AVS? MyChart  If the video visit is dropped, the invitation should be resent by: Text to cell phone: [phone]  Will anyone else be joining your video visit? No          Assessment & Plan     Acute cough  Medication refilled, continue to follow with [facility]  - guaiFENesin-codeine (ROBITUSSIN AC) 100-10 MG/5ML solution  Dispense: 118 mL; Refill: 5    Myalgia  Recheck labs.   - CRP, inflammation  - ESR: Erythrocyte sedimentation rate    Long COVID  - continue medication and follow up with [facility]   - predniSONE (DELTASONE) 5 MG tablet  Dispense: 60 tablet; Refill: 3    Primary hypertension  - medication refilled  - furosemide (LASIX) 20 MG tablet  Dispense: 270 tablet; Refill: 3  - Comprehensive metabolic panel (BMP + Alb, Alk Phos, ALT, AST, Total. Bili, TP)    Thrombocytosis, unspecified  - recheck labs   - CRP, inflammation  - ESR: Erythrocyte sedimentation rate  - CBC with platelets    Malignant neoplasm of thyroid gland (H)  - increase medication and then recheck labs in 6 weeks.   - levothyroxine (SYNTHROID/LEVOTHROID) 100 MCG tablet  Dispense: 90 tablet; Refill: 1  - TSH  - T4, free    Chronic fatigue syndrome  - on medication and refilled in addition to provigil, no refills needed at this time for provigil    - predniSONE (DELTASONE) 5 MG tablet  Dispense: 60 tablet; Refill: 3    Fibromyalgia  - refill of lorazepam and then stable on prednisone    - predniSONE (DELTASONE) 5 MG tablet  Dispense: 60 tablet; Refill: 3  - LORazepam (ATIVAN) 1 MG tablet  Dispense: 30 tablet; Refill: 5    Chronic migraine without aura without status migrainosus, not intractable  - medication refilled  - eletriptan (RELPAX) 20 MG tablet  Dispense: 30 tablet; Refill: 3    - Patient has in person appointment for pre op will recheck vitals and condition at that appointment.     - spent 30 minutes on  virtual visit with care coordination and counseling.   Kelsie Griffith NP  [facility] GABBY Higginbotham is a 43 year old, presenting for the following health issues:  Establish Care    History of Present Illness       Reason for visit:  Follow-up on long cocid with PCP including fatigue, brain fog, weight gain, lack of appetite, etc    She eats 4 or more servings of fruits and vegetables daily.She consumes 0 sweetened beverage(s) daily.She exercises with enough effort to increase her heart rate 9 or less minutes per day.  She exercises with enough effort to increase her heart rate 3 or less days per week.   She is taking medications regularly.     Wt Readings from Last 3 Encounters:   08/16/21 71.2 kg (157 lb)   08/13/21 73 kg (161 lb)   11/30/20 61.2 kg (135 lb)     TSH   Date Value Ref Range Status   05/22/2023 0.69 0.30 - 4.20 uIU/mL Final     - long covid clinic, on new medication that is helping following closely with them    - going to get tubes removed due to high risk and she is going to have exploratory lap for ovarian cyst vs mass on left side. This is painful and increasing in pain, follows with OB/GYN.    - Blood pressure is improved    - On long term prednisone 5-10 mg for chronic inflammation    - use percocet as needed for pain    - try increasing thyroid medication, recheck labs in 6 weeks to help with chronic fatigue      Review of Systems   Constitutional, HEENT, cardiovascular, pulmonary, gi and gu systems are negative, except as otherwise noted.      Objective    Vitals - Patient Reported  Systolic (Patient Reported): 129  Diastolic (Patient Reported): (!) 90  Blood pressure taken with manual cuff (will exclude from quality measure): Yes  Weight (Patient Reported): 78.5 kg (173 lb)  Pulse (Patient Reported): 104      Vitals:  No vitals were obtained today due to virtual visit.    Physical Exam   GENERAL: Healthy, alert and no distress  EYES: Eyes grossly  normal to inspection.  No discharge or erythema, or obvious scleral/conjunctival abnormalities.  HENT: Normal cephalic/atraumatic.  External ears, nose and mouth without ulcers or lesions.  No nasal drainage visible.  RESP: No audible wheeze, cough, or visible cyanosis.  No visible retractions or increased work of breathing.    SKIN: Visible skin clear. No significant rash, abnormal pigmentation or lesions.  NEURO: Cranial nerves grossly intact.  Mentation and speech appropriate for age.  PSYCH: Mentation appears normal, affect normal/bright, judgement and insight intact, normal speech and appearance well-groomed.            Video-Visit Details    Type of service:  Video Visit     Originating Location (pt. Location): Home    Distant Location (provider location):  On-site  Platform used for Video Visit: JodiWell

## 2023-06-14 RX ORDER — CLINDAMYCIN PHOSPHATE 900 MG/50ML
900 INJECTION, SOLUTION INTRAVENOUS SEE ADMIN INSTRUCTIONS
Status: CANCELLED | OUTPATIENT
Start: 2023-06-14

## 2023-06-14 RX ORDER — CLINDAMYCIN PHOSPHATE 900 MG/50ML
900 INJECTION, SOLUTION INTRAVENOUS
Status: CANCELLED | OUTPATIENT
Start: 2023-06-14

## 2023-06-20 ENCOUNTER — MYC MEDICAL ADVICE (OUTPATIENT)
Dept: FAMILY MEDICINE | Facility: CLINIC | Age: 43
End: 2023-06-20
Payer: COMMERCIAL

## 2023-06-20 NOTE — TELEPHONE ENCOUNTER
Please see MyChart message from Anahy and babare, e-visit may be needed.     Thank you,  Kassi Foreman R.N.

## 2023-06-23 NOTE — TELEPHONE ENCOUNTER
DX, Referring NOTES: HX of partial thyroidectomy, malignant neoplasm of thyroid gland; referred by Michelle Munoz    For Cancer Patients: Need the original operative and surgical pathology reports and all imaging reports/images related to the disease (includes all thyroid US, nuclear thyroid and total body scans, PET scans, chest CT reports since prior to the diagnosis ).   APPT DATE: 9/19/2023   NOTES (FOR ALL VISITS) STATUS DETAILS   OFFICE NOTES from referring provider Internal [facility]:  2/7/23 - PCC OV with Michelle Munoz NP   OFFICE NOTES from other specialist Care Everywhere / Internal [facility]:  8/17/23, 7/17/23 - PCC OV with Kelsie Griffith NP    [facility]:  8/16/23, 5/31/23 - PMR OV with Dr. Cao    [facility]:  12/2/22 - ENDO OV with Dr. Dai  12/2/22 - NEURO OV with Dr. Wong  11/7/22 - ENT OV with Dr. Ritchie  9/21/22 - ENDO OV with Dr. Adler    [facility]:  11/21/22, 8/11/22 - ONC OV with Dr. Yolnade [facility]:  3/4/20 - UC OV with Dr. Varela   ED NOTES Care Everywhere [place]:  8/29/22 - ED OV with Dr. Rodriguez  8/27/22 - ED OV with Dr. China [facility]:  8/29/22 - ED OV with Dr. Quiles   OPERATIVE REPORT  (thyroid, pituitary, adrenal, parathyroid)  (All op notes related to diagnoses) Care Everywhere [place]:  8/26/22 - OP Note for THYROIDECTOMY, LOBECTOMY with Dr. Adler   MEDICATION LIST Internal    IMAGING      MRI (BRAIN) Received [place]:  12/22/22 - MRI Brain   XR (Chest) Received [facility]:  6/1/22 - XR Chest    [facility]:  5/31/22 - XR Chest  12/5/19 - XR Chest  11/25/19 - XR Chest   CT (HEAD/NECK/CHEST/ABDOMEN) In process / Internal [facility]:  6/26/23 - CT Abd/Pelvis  5/18/22 - CTA Chest    [facility]:  8/30/22 - CT Neck  8/27/22 - CT Neck    [facility]:  7/14/22 - CT Head/Sinus    [facility]:  10/20/17 - CT Abd/Pelvis   ULTRASOUND (HEAD/NECK)  * Include FNAs Received / Internal [place]:  8/25/22 - US Thyroid  8/16/22 - US Neck FNA    [facility]:  8/4/22 - US  Thyroid/Parathyroid   LABS     DIABETES: HBGA1C, CREATININE, FASTING LIPIDS, MICROALBUMIN URINE, POTASSIUM, TSH, T4    THYROID: TSH, T4, CBC, THYRODLONULIN, TOTAL T3, FREE T4, CALCITONIN, CEA Care Everywhere / Internal [facility]:  7/17/23 - BMP  7/17/23 - CBC  7/17/23 - TSH, T4  4/4/23 - HBGA1C  8/13/21 - Potassium    [facility]:  6/26/23 - Urine Analysis  2/2/23 - CMP  2/2/23 - Cortisol  8/16/22 - Vitamin D  7/6/22 - T3  6/23/22 - Iron    [place]:  8/25/22 - Thyroperoxidase  8/16/22 - Creatinine   PATHOLOGY REPORTS WITH CASE NUMBER  *Surgical path reports for endocrine organs (ovaries, testes, pancreas, etc) Care Everywhere / Internal   [facility]:  7/20/23 - Ovary Biopsy (Case: [id number])    [facility]:  8/26/22 - Thyroid Biopsy (Case: [id number]-)  8/16/22 - Neck FNA (Case: [id number])     Records Requested  06/23/23    Facility  [facility]  Fax: [phone]  [place]  Fax: [phone]   [place]  Fax: [phone]   [facility]  Fax: [phone]    Outcome * 6/23/23 10:51 AM Faxed req to [facility], [place], and [place] for images to be pushed to [facility] PACs. - Jossy    * 6/23/23 3:07 PM Images received from [place] and [place] and attached to the patient in PACs. - Jossy    * 7/19/23 8:38 AM Images received from [facility] and attached to the patient in PACs. Faxed req to  for remaining images. - Jossy    * 8/9/23 7:47 AM Images received from  and attached to the patient in PACs.  Faxed req for additional CT - Jossy    * 8/23/23 8:48 AM CT received from  and attached to the patient in PACs. - Jossy

## 2023-06-26 ENCOUNTER — NURSE TRIAGE (OUTPATIENT)
Dept: FAMILY MEDICINE | Facility: CLINIC | Age: 43
End: 2023-06-26
Payer: COMMERCIAL

## 2023-06-26 DIAGNOSIS — N93.9 VAGINAL BLEEDING: Primary | ICD-10-CM

## 2023-06-26 NOTE — TELEPHONE ENCOUNTER
The patient can get labs done, she does not need to present to ADS at this time, she is likely having bloody drainage from possible cyst rupture.   She should use heating pad and can use NSAIDS as needed for pain.   Orders placed for stat CBC  Kelsie Griffith NP on 6/26/2023 at 2:59 PM

## 2023-06-26 NOTE — TELEPHONE ENCOUNTER
Anahy,   Yes you would do every other day x 1 week and then stop. We can put you back on when you are done with surgery  Let me know  Kelsie Griffith NP on 6/26/2023 at 12:01 PM

## 2023-06-26 NOTE — TELEPHONE ENCOUNTER
Nurse Triage SBAR    Is this a 2nd Level Triage? YES, LICENSED PRACTITIONER REVIEW IS REQUIRED    Situation: Patient has an ovarian cyst of her left ovary and having vaginal bleeding/clotting as well as pain since 06/24/2023. Patient declines ER visit and requesting labs to check to see if she is anemic and provider advise on being seen in clinic.     Background: Patient has history of ovarian cyst on left ovary that is scheduled to be removed in September. Patient has had pain for quite some time, but experienced increased in pain 9/10 at worst sine 06/24/2023. Patient states that vaginal bleeding incer    Assessment: Patient calls and states she is concerned about her vaginal blood loss and is requesting labs to check hemoglobin level and would like to see if she wants to be seen in clinic. Patient declines ER/ED visit. Patient states that she has been having heavy clotting since 06/24/2023 with some clots being the size of dime/quarter size up to large half dollar sized clots. Patient says wearing a tampon didn't help and she is now wearing large overnight pads and changing them approximately every hour. Patient states she does feel some nausea and dizziness, but she is not feeling weak, having vision changes or feeling like she needs to pass out. Patient states that her OBGYN is through [facility], but her OBGGYN is out and she would like recommendations from her primary. Patient states she recently established care with Kelsie Asher NP.     Protocol Recommended Disposition:   Go To ED/UCC Now (Or To Office With PCP Approval)    Recommendation: Please go to the ER if having any worsening of dizziness, pain or bleeding. Will reach out to primary care provider and care team to advise for 2nd level triage     Routed to provider    Does the patient meet one of the following criteria for ADS visit consideration? 16+ years old, with an [facility] PCP     TIP  Providers, please consider if this condition is  "appropriate for management at one of our Acute and Diagnostic Services sites.     If patient is a good candidate, please use dotphrase <dot>triageresponse and select Refer to ADS to document.    Reason for Disposition    Constant abdominal pain lasting > 2 hours    Additional Information    Negative: SEVERE vaginal bleeding (e.g., continuous red blood from vagina, or large blood clots) and very weak (can't stand)    Negative: Passed out (i.e., fainted, collapsed and was not responding)    Negative: Difficult to awaken or acting confused (e.g., disoriented, slurred speech)    Negative: Shock suspected (e.g., cold/pale/clammy skin, too weak to stand, low BP, rapid pulse)    Negative: Sounds like a life-threatening emergency to the triager    Negative: Pregnant 20 or more weeks (5 months or more)    Negative: Pregnant < 20 weeks (less than 5 months)    Negative: Postpartum (from 0 to 6 weeks after delivery)    Negative: Vaginal discharge is main symptom and bleeding is slight    Negative: SEVERE abdominal pain (e.g., excruciating)    Negative: SEVERE dizziness (e.g., unable to stand, requires support to walk, feels like passing out now)    Negative: SEVERE vaginal bleeding (e.g., soaking 2 pads or tampons per hour and present 2 or more hours; 1 menstrual cup every 2 hours)    Negative: MODERATE vaginal bleeding (i.e., soaking pad or tampon per hour and present > 6 hours; 1 menstrual cup every 6 hours)    Negative: Pale skin (pallor) of new-onset or worsening    Answer Assessment - Initial Assessment Questions  1. AMOUNT: \"Describe the bleeding that you are having.\"     - SPOTTING: spotting, or pinkish / brownish mucous discharge; does not fill panty liner or pad     - MILD:  less than 1 pad / hour; less than patient's usual menstrual bleeding    - MODERATE: 1-2 pads / hour; 1 menstrual cup every 6 hours; small-medium blood clots (e.g., pea, grape, small coin)    - SEVERE: soaking 2 or more pads/hour for 2 or more " "hours; 1 menstrual cup every 2 hours; bleeding not contained by pads or continuous red blood from vagina; large blood clots (e.g., golf ball, large coin)       Dime-quarter sized blood clots (3-4) together, some silver dollar size clots. Wearing pads overnight having to change every hour.  2. ONSET: \"When did the bleeding begin?\" \"Is it continuing now?\"      06/24/2023  3. MENSTRUAL PERIOD: \"When was the last normal menstrual period?\" \"How is this different than your period?\"      Clots and increased in bleeding  4. REGULARITY: \"How regular are your periods?\"      No, irregular due to cysts. No known pattern per patient.   5. ABDOMINAL PAIN: \"Do you have any pain?\" \"How bad is the pain?\"  (e.g., Scale 1-10; mild, moderate, or severe)    - MILD (1-3): doesn't interfere with normal activities, abdomen soft and not tender to touch     - MODERATE (4-7): interferes with normal activities or awakens from sleep, abdomen tender to touch     - SEVERE (8-10): excruciating pain, doubled over, unable to do any normal activities       9/10 at worst lasts less than 30 minutes. Regular pain at 5/6 constant.   6. PREGNANCY: \"Could you be pregnant?\" \"Are you sexually active?\" \"Did you recently give birth?\"      No  7. BREASTFEEDING: \"Are you breastfeeding?\"      No  8. HORMONES: \"Are you taking any hormone medications, prescription or OTC?\" (e.g., birth control pills, estrogen)      No  9. BLOOD THINNERS: \"Do you take any blood thinners?\" (e.g., Coumadin/warfarin, Pradaxa/dabigatran, aspirin)      No  10. CAUSE: \"What do you think is causing the bleeding?\" (e.g., recent gyn surgery, recent gyn procedure; known bleeding disorder, cervical cancer, polycystic ovarian disease, fibroids)          Ovarian cyst of left ovary  11. HEMODYNAMIC STATUS: \"Are you weak or feeling lightheaded?\" If Yes, ask: \"Can you stand and walk normally?\"         Dizziness since 06/24/2023. Wave of pain   12. OTHER SYMPTOMS: \"What other symptoms are you having " "with the bleeding?\" (e.g., passed tissue, vaginal discharge, fever, menstrual-type cramps)        Abdominal pain    Protocols used: VAGINAL BLEEDING - [id number]      "

## 2023-07-05 ENCOUNTER — MYC MEDICAL ADVICE (OUTPATIENT)
Dept: FAMILY MEDICINE | Facility: CLINIC | Age: 43
End: 2023-07-05
Payer: COMMERCIAL

## 2023-07-05 NOTE — TELEPHONE ENCOUNTER
Patient is calling regarding her message below.    Read patient the message from TC.    Patient states she is available tomorrow after 11:00a, all day Friday 7/7/23, 7/10, 7/11, 7/17/ and 7/18.    Please advise, thanks.

## 2023-07-09 ENCOUNTER — HEALTH MAINTENANCE LETTER (OUTPATIENT)
Age: 43
End: 2023-07-09

## 2023-07-10 RX ORDER — CLINDAMYCIN PHOSPHATE 900 MG/50ML
900 INJECTION, SOLUTION INTRAVENOUS
Status: CANCELLED | OUTPATIENT
Start: 2023-07-10

## 2023-07-17 ENCOUNTER — OFFICE VISIT (OUTPATIENT)
Dept: FAMILY MEDICINE | Facility: CLINIC | Age: 43
End: 2023-07-17
Payer: COMMERCIAL

## 2023-07-17 VITALS
BODY MASS INDEX: 29.02 KG/M2 | WEIGHT: 174.2 LBS | TEMPERATURE: 98 F | DIASTOLIC BLOOD PRESSURE: 86 MMHG | OXYGEN SATURATION: 100 % | SYSTOLIC BLOOD PRESSURE: 135 MMHG | RESPIRATION RATE: 18 BRPM | HEIGHT: 65 IN | HEART RATE: 108 BPM

## 2023-07-17 DIAGNOSIS — B97.7 HPV (HUMAN PAPILLOMA VIRUS) INFECTION: ICD-10-CM

## 2023-07-17 DIAGNOSIS — N93.9 VAGINAL BLEEDING: ICD-10-CM

## 2023-07-17 DIAGNOSIS — Z01.818 PREOP GENERAL PHYSICAL EXAM: Primary | ICD-10-CM

## 2023-07-17 DIAGNOSIS — U09.9 LONG COVID: ICD-10-CM

## 2023-07-17 DIAGNOSIS — C73 MALIGNANT NEOPLASM OF THYROID GLAND (H): ICD-10-CM

## 2023-07-17 DIAGNOSIS — E28.2 POLYCYSTIC OVARIES: ICD-10-CM

## 2023-07-17 DIAGNOSIS — E87.6 HYPOKALEMIA: ICD-10-CM

## 2023-07-17 DIAGNOSIS — I10 PRIMARY HYPERTENSION: ICD-10-CM

## 2023-07-17 DIAGNOSIS — D75.839 THROMBOCYTOSIS: ICD-10-CM

## 2023-07-17 LAB — ERYTHROCYTE [SEDIMENTATION RATE] IN BLOOD BY WESTERGREN METHOD: 13 MM/HR (ref 0–20)

## 2023-07-17 PROCEDURE — 36415 COLL VENOUS BLD VENIPUNCTURE: CPT | Performed by: NURSE PRACTITIONER

## 2023-07-17 PROCEDURE — 85027 COMPLETE CBC AUTOMATED: CPT | Performed by: NURSE PRACTITIONER

## 2023-07-17 PROCEDURE — 84439 ASSAY OF FREE THYROXINE: CPT | Performed by: NURSE PRACTITIONER

## 2023-07-17 PROCEDURE — 93000 ELECTROCARDIOGRAM COMPLETE: CPT | Performed by: NURSE PRACTITIONER

## 2023-07-17 PROCEDURE — 84443 ASSAY THYROID STIM HORMONE: CPT | Performed by: NURSE PRACTITIONER

## 2023-07-17 PROCEDURE — 86140 C-REACTIVE PROTEIN: CPT | Performed by: NURSE PRACTITIONER

## 2023-07-17 PROCEDURE — 80048 BASIC METABOLIC PNL TOTAL CA: CPT | Performed by: NURSE PRACTITIONER

## 2023-07-17 PROCEDURE — 99214 OFFICE O/P EST MOD 30 MIN: CPT | Performed by: NURSE PRACTITIONER

## 2023-07-17 PROCEDURE — 85652 RBC SED RATE AUTOMATED: CPT | Performed by: NURSE PRACTITIONER

## 2023-07-17 RX ORDER — LOTEPREDNOL ETABONATE 3.8 MG/G
GEL OPHTHALMIC
COMMUNITY
Start: 2023-07-11 | End: 2023-07-17

## 2023-07-17 RX ORDER — DULOXETIN HYDROCHLORIDE 60 MG/1
CAPSULE, DELAYED RELEASE ORAL
COMMUNITY
Start: 2022-04-22 | End: 2023-07-17

## 2023-07-17 RX ORDER — BENZONATATE 100 MG/1
100 CAPSULE ORAL
COMMUNITY
Start: 2021-07-27 | End: 2023-07-17

## 2023-07-17 RX ORDER — HYDROXYZINE PAMOATE 50 MG/1
CAPSULE ORAL
COMMUNITY
Start: 2023-06-08 | End: 2023-07-17

## 2023-07-17 RX ORDER — AMLODIPINE BESYLATE 5 MG/1
5 TABLET ORAL
COMMUNITY
Start: 2022-06-13 | End: 2023-07-17

## 2023-07-17 RX ORDER — CLOBETASOL PROPIONATE 0.05 G/100ML
SHAMPOO TOPICAL
COMMUNITY
Start: 2022-02-22

## 2023-07-17 RX ORDER — LOTEPREDNOL ETABONATE 3.8 MG/G
1 GEL OPHTHALMIC
COMMUNITY
Start: 2023-07-11

## 2023-07-17 ASSESSMENT — PAIN SCALES - GENERAL: PAINLEVEL: NO PAIN (0)
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
## 2023-07-17 NOTE — PATIENT INSTRUCTIONS
For informational purposes only. Not to replace the advice of your health care provider. Copyright   2003,  [facility]. All rights reserved. Clinically reviewed by Jeanne Sims MD. [employer] [id number] - REV .  Preparing for Your Surgery  Getting started  A nurse will call you to review your health history and instructions. They will give you an arrival time based on your scheduled surgery time. Please be ready to share:  Your doctor's clinic name and phone number  Your medical, surgical, and anesthesia history  A list of allergies and sensitivities  A list of medicines, including herbal treatments and over-the-counter drugs  Whether the patient has a legal guardian (ask how to send us the papers in advance)  Please tell us if you're pregnant--or if there's any chance you might be pregnant. Some surgeries may injure a fetus (unborn baby), so they require a pregnancy test. Surgeries that are safe for a fetus don't always need a test, and you can choose whether to have one.   If you have a child who's having surgery, please ask for a copy of Preparing for Your Child's Surgery.    Preparing for surgery  Within 10 to 30 days of surgery: Have a pre-op exam (sometimes called an H&P, or History and Physical). This can be done at a clinic or pre-operative center.  If you're having a , you may not need this exam. Talk to your care team.  At your pre-op exam, talk to your care team about all medicines you take. If you need to stop any medicines before surgery, ask when to start taking them again.  We do this for your safety. Many medicines can make you bleed too much during surgery. Some change how well surgery (anesthesia) drugs work.  Call your insurance company to let them know you're having surgery. (If you don't have insurance, call [phone].)  Call your clinic if there's any change in your health. This includes signs of a cold or flu (sore throat, runny nose, cough, rash, fever). It  also includes a scrape or scratch near the surgery site.  If you have questions on the day of surgery, call your hospital or surgery center.  Eating and drinking guidelines  For your safety: Unless your surgeon tells you otherwise, follow the guidelines below.  Eat and drink as usual until 8 hours before you arrive for surgery. After that, no food or milk.  Drink clear liquids until 2 hours before you arrive. These are liquids you can see through, like water, Gatorade, and Propel Water. They also include plain black coffee and tea (no cream or milk), candy, and breath mints. You can spit out gum when you arrive.  If you drink alcohol: Stop drinking it the night before surgery.  If your care team tells you to take medicine on the morning of surgery, it's okay to take it with a sip of water.  Preventing infection  Shower or bathe the night before and morning of your surgery. Follow the instructions your clinic gave you. (If no instructions, use regular soap.)  Don't shave or clip hair near your surgery site. We'll remove the hair if needed.  Don't smoke or vape the morning of surgery. You may chew nicotine gum up to 2 hours before surgery. A nicotine patch is okay.  Note: Some surgeries require you to completely quit smoking and nicotine. Check with your surgeon.  Your care team will make every effort to keep you safe from infection. We will:  Clean our hands often with soap and water (or an alcohol-based hand rub).  Clean the skin at your surgery site with a special soap that kills germs.  Give you a special gown to keep you warm. (Cold raises the risk of infection.)  Wear special hair covers, masks, gowns and gloves during surgery.  Give antibiotic medicine, if prescribed. Not all surgeries need antibiotics.  What to bring on the day of surgery  Photo ID and insurance card  Copy of your health care directive, if you have one  Glasses and hearing aids (bring cases)  You can't wear contacts during surgery  Inhaler and  eye drops, if you use them (tell us about these when you arrive)  CPAP machine or breathing device, if you use them  A few personal items, if spending the night  If you have . . .  A pacemaker, ICD (cardiac defibrillator) or other implant: Bring the ID card.  An implanted stimulator: Bring the remote control.  A legal guardian: Bring a copy of the certified (court-stamped) guardianship papers.  Please remove any jewelry, including body piercings. Leave jewelry and other valuables at home.  If you're going home the day of surgery  You must have a responsible adult drive you home. They should stay with you overnight as well.  If you don't have someone to stay with you, and you aren't safe to go home alone, we may keep you overnight. Insurance often won't pay for this.  After surgery  If it's hard to control your pain or you need more pain medicine, please call your surgeon's office.  Questions?   If you have any questions for your care team, list them here: _________________________________________________________________________________________________________________________________________________________________________ ____________________________________ ____________________________________ ____________________________________    How to Take Your Medication Before Surgery  - Take all of your medications before surgery as usual

## 2023-07-18 ENCOUNTER — MYC MEDICAL ADVICE (OUTPATIENT)
Dept: FAMILY MEDICINE | Facility: CLINIC | Age: 43
End: 2023-07-18
Payer: COMMERCIAL

## 2023-07-18 DIAGNOSIS — J30.2 SEASONAL ALLERGIC RHINITIS, UNSPECIFIED TRIGGER: Primary | ICD-10-CM

## 2023-07-18 LAB
ANION GAP SERPL CALCULATED.3IONS-SCNC: 15 MMOL/L (ref 7–15)
BUN SERPL-MCNC: 11.6 MG/DL (ref 6–20)
CALCIUM SERPL-MCNC: 8.9 MG/DL (ref 8.6–10)
CHLORIDE SERPL-SCNC: 103 MMOL/L (ref 98–107)
CREAT SERPL-MCNC: 0.75 MG/DL (ref 0.51–0.95)
CRP SERPL-MCNC: 10.1 MG/L
DEPRECATED HCO3 PLAS-SCNC: 20 MMOL/L (ref 22–29)
ERYTHROCYTE [DISTWIDTH] IN BLOOD BY AUTOMATED COUNT: 12.9 % (ref 10–15)
GFR SERPL CREATININE-BSD FRML MDRD: >90 ML/MIN/1.73M2
GLUCOSE SERPL-MCNC: 84 MG/DL (ref 70–99)
HCT VFR BLD AUTO: 40.9 % (ref 35–47)
HGB BLD-MCNC: 13.5 G/DL (ref 11.7–15.7)
MCH RBC QN AUTO: 29.1 PG (ref 26.5–33)
MCHC RBC AUTO-ENTMCNC: 33 G/DL (ref 31.5–36.5)
MCV RBC AUTO: 88 FL (ref 78–100)
PLATELET # BLD AUTO: 325 10E3/UL (ref 150–450)
POTASSIUM SERPL-SCNC: 4.1 MMOL/L (ref 3.4–5.3)
RBC # BLD AUTO: 4.64 10E6/UL (ref 3.8–5.2)
SODIUM SERPL-SCNC: 138 MMOL/L (ref 136–145)
T4 FREE SERPL-MCNC: 1.65 NG/DL (ref 0.9–1.7)
TSH SERPL DL<=0.005 MIU/L-ACNC: 0.13 UIU/ML (ref 0.3–4.2)
WBC # BLD AUTO: 9.7 10E3/UL (ref 4–11)

## 2023-07-18 NOTE — TELEPHONE ENCOUNTER
Routing refill request to provider for review/approval because:  Drug not on the [facility] refill protocol     Belia WILLETT RN

## 2023-07-20 ENCOUNTER — ANESTHESIA (OUTPATIENT)
Dept: SURGERY | Facility: AMBULATORY SURGERY CENTER | Age: 43
End: 2023-07-20
Payer: COMMERCIAL

## 2023-07-20 ENCOUNTER — HOSPITAL ENCOUNTER (OUTPATIENT)
Facility: AMBULATORY SURGERY CENTER | Age: 43
Discharge: HOME | End: 2023-07-20
Attending: OBSTETRICS & GYNECOLOGY
Payer: COMMERCIAL

## 2023-07-20 ENCOUNTER — ANESTHESIA EVENT (OUTPATIENT)
Dept: SURGERY | Facility: AMBULATORY SURGERY CENTER | Age: 43
End: 2023-07-20
Payer: COMMERCIAL

## 2023-07-20 VITALS
SYSTOLIC BLOOD PRESSURE: 120 MMHG | TEMPERATURE: 97.4 F | BODY MASS INDEX: 29.02 KG/M2 | HEART RATE: 76 BPM | OXYGEN SATURATION: 100 % | HEIGHT: 65 IN | DIASTOLIC BLOOD PRESSURE: 77 MMHG | RESPIRATION RATE: 14 BRPM | WEIGHT: 174.2 LBS

## 2023-07-20 DIAGNOSIS — Z30.2 ENCOUNTER FOR FEMALE STERILIZATION PROCEDURE: ICD-10-CM

## 2023-07-20 DIAGNOSIS — N80.9 ENDOMETRIOSIS: ICD-10-CM

## 2023-07-20 DIAGNOSIS — Z98.890 H/O LAPAROSCOPY: Primary | ICD-10-CM

## 2023-07-20 LAB
HCG UR QL: NEGATIVE
INTERNAL QC OK POCT: NORMAL
POCT KIT EXPIRATION DATE: NORMAL
POCT KIT LOT NUMBER: NORMAL

## 2023-07-20 RX ORDER — DEXAMETHASONE SODIUM PHOSPHATE 4 MG/ML
INJECTION, SOLUTION INTRA-ARTICULAR; INTRALESIONAL; INTRAMUSCULAR; INTRAVENOUS; SOFT TISSUE PRN
Status: DISCONTINUED | OUTPATIENT
Start: 2023-07-20 | End: 2023-07-20

## 2023-07-20 RX ORDER — ONDANSETRON 4 MG/1
4 TABLET, ORALLY DISINTEGRATING ORAL EVERY 30 MIN PRN
Status: DISCONTINUED | OUTPATIENT
Start: 2023-07-20 | End: 2023-07-21 | Stop reason: HOSPADM

## 2023-07-20 RX ORDER — SODIUM CHLORIDE, SODIUM LACTATE, POTASSIUM CHLORIDE, CALCIUM CHLORIDE 600; 310; 30; 20 MG/100ML; MG/100ML; MG/100ML; MG/100ML
INJECTION, SOLUTION INTRAVENOUS CONTINUOUS
Status: DISCONTINUED | OUTPATIENT
Start: 2023-07-20 | End: 2023-07-21 | Stop reason: HOSPADM

## 2023-07-20 RX ORDER — OXYCODONE HYDROCHLORIDE 5 MG/1
5 TABLET ORAL
Status: COMPLETED | OUTPATIENT
Start: 2023-07-20 | End: 2023-07-20

## 2023-07-20 RX ORDER — LIDOCAINE 40 MG/G
CREAM TOPICAL
Status: DISCONTINUED | OUTPATIENT
Start: 2023-07-20 | End: 2023-07-21 | Stop reason: HOSPADM

## 2023-07-20 RX ORDER — MAGNESIUM SULFATE HEPTAHYDRATE 40 MG/ML
4 INJECTION, SOLUTION INTRAVENOUS ONCE
Status: COMPLETED | OUTPATIENT
Start: 2023-07-20 | End: 2023-07-20

## 2023-07-20 RX ORDER — OXYCODONE HYDROCHLORIDE 10 MG/1
10 TABLET ORAL
Status: DISCONTINUED | OUTPATIENT
Start: 2023-07-20 | End: 2023-07-21 | Stop reason: HOSPADM

## 2023-07-20 RX ORDER — ACETAMINOPHEN 325 MG/1
975 TABLET ORAL ONCE
Status: COMPLETED | OUTPATIENT
Start: 2023-07-20 | End: 2023-07-20

## 2023-07-20 RX ORDER — HYDROMORPHONE HCL IN WATER/PF 6 MG/30 ML
0.4 PATIENT CONTROLLED ANALGESIA SYRINGE INTRAVENOUS EVERY 5 MIN PRN
Status: DISCONTINUED | OUTPATIENT
Start: 2023-07-20 | End: 2023-07-21 | Stop reason: HOSPADM

## 2023-07-20 RX ORDER — KETAMINE HYDROCHLORIDE 10 MG/ML
INJECTION INTRAMUSCULAR; INTRAVENOUS PRN
Status: DISCONTINUED | OUTPATIENT
Start: 2023-07-20 | End: 2023-07-20

## 2023-07-20 RX ORDER — FENTANYL CITRATE 0.05 MG/ML
25 INJECTION, SOLUTION INTRAMUSCULAR; INTRAVENOUS
Status: DISCONTINUED | OUTPATIENT
Start: 2023-07-20 | End: 2023-07-21 | Stop reason: HOSPADM

## 2023-07-20 RX ORDER — FENTANYL CITRATE 0.05 MG/ML
25 INJECTION, SOLUTION INTRAMUSCULAR; INTRAVENOUS EVERY 5 MIN PRN
Status: DISCONTINUED | OUTPATIENT
Start: 2023-07-20 | End: 2023-07-21 | Stop reason: HOSPADM

## 2023-07-20 RX ORDER — ONDANSETRON 2 MG/ML
4 INJECTION INTRAMUSCULAR; INTRAVENOUS EVERY 30 MIN PRN
Status: DISCONTINUED | OUTPATIENT
Start: 2023-07-20 | End: 2023-07-21 | Stop reason: HOSPADM

## 2023-07-20 RX ORDER — HYDROMORPHONE HCL IN WATER/PF 6 MG/30 ML
0.2 PATIENT CONTROLLED ANALGESIA SYRINGE INTRAVENOUS EVERY 5 MIN PRN
Status: DISCONTINUED | OUTPATIENT
Start: 2023-07-20 | End: 2023-07-21 | Stop reason: HOSPADM

## 2023-07-20 RX ORDER — GLYCOPYRROLATE 0.2 MG/ML
INJECTION, SOLUTION INTRAMUSCULAR; INTRAVENOUS PRN
Status: DISCONTINUED | OUTPATIENT
Start: 2023-07-20 | End: 2023-07-20

## 2023-07-20 RX ORDER — NEOSTIGMINE METHYLSULFATE 1 MG/ML
VIAL (ML) INJECTION PRN
Status: DISCONTINUED | OUTPATIENT
Start: 2023-07-20 | End: 2023-07-20

## 2023-07-20 RX ORDER — OXYCODONE HYDROCHLORIDE 5 MG/1
5 TABLET ORAL EVERY 6 HOURS PRN
Qty: 10 TABLET | Refills: 0 | Status: SHIPPED | OUTPATIENT
Start: 2023-07-20 | End: 2023-07-23

## 2023-07-20 RX ORDER — ONDANSETRON 2 MG/ML
INJECTION INTRAMUSCULAR; INTRAVENOUS PRN
Status: DISCONTINUED | OUTPATIENT
Start: 2023-07-20 | End: 2023-07-20

## 2023-07-20 RX ORDER — ACETAMINOPHEN 325 MG/1
975 TABLET ORAL ONCE
Status: DISCONTINUED | OUTPATIENT
Start: 2023-07-20 | End: 2023-07-21 | Stop reason: HOSPADM

## 2023-07-20 RX ORDER — LORATADINE PSEUDOEPHEDRINE SULFATE 10; 240 MG/1; MG/1
1 TABLET, EXTENDED RELEASE ORAL DAILY
Qty: 30 TABLET | Refills: 11 | Status: SHIPPED | OUTPATIENT
Start: 2023-07-20 | End: 2024-01-17

## 2023-07-20 RX ORDER — DIPHENHYDRAMINE HYDROCHLORIDE 50 MG/ML
12.5 INJECTION INTRAMUSCULAR; INTRAVENOUS ONCE
Status: COMPLETED | OUTPATIENT
Start: 2023-07-20 | End: 2023-07-20

## 2023-07-20 RX ORDER — PROPOFOL 10 MG/ML
INJECTION, EMULSION INTRAVENOUS PRN
Status: DISCONTINUED | OUTPATIENT
Start: 2023-07-20 | End: 2023-07-20

## 2023-07-20 RX ORDER — PROPOFOL 10 MG/ML
INJECTION, EMULSION INTRAVENOUS CONTINUOUS PRN
Status: DISCONTINUED | OUTPATIENT
Start: 2023-07-20 | End: 2023-07-20

## 2023-07-20 RX ORDER — FENTANYL CITRATE 50 UG/ML
INJECTION, SOLUTION INTRAMUSCULAR; INTRAVENOUS PRN
Status: DISCONTINUED | OUTPATIENT
Start: 2023-07-20 | End: 2023-07-20

## 2023-07-20 RX ORDER — OXYCODONE HYDROCHLORIDE 5 MG/1
5 TABLET ORAL
Status: DISCONTINUED | OUTPATIENT
Start: 2023-07-20 | End: 2023-07-21 | Stop reason: HOSPADM

## 2023-07-20 RX ORDER — CLINDAMYCIN PHOSPHATE 600 MG/50ML
600 INJECTION, SOLUTION INTRAVENOUS ONCE
Status: COMPLETED | OUTPATIENT
Start: 2023-07-20 | End: 2023-07-20

## 2023-07-20 RX ORDER — LIDOCAINE HYDROCHLORIDE 20 MG/ML
INJECTION, SOLUTION INFILTRATION; PERINEURAL PRN
Status: DISCONTINUED | OUTPATIENT
Start: 2023-07-20 | End: 2023-07-20

## 2023-07-20 RX ORDER — IBUPROFEN 800 MG/1
800 TABLET, FILM COATED ORAL ONCE
Status: DISCONTINUED | OUTPATIENT
Start: 2023-07-20 | End: 2023-07-21 | Stop reason: HOSPADM

## 2023-07-20 RX ORDER — BUPIVACAINE HYDROCHLORIDE 2.5 MG/ML
INJECTION, SOLUTION EPIDURAL; INFILTRATION; INTRACAUDAL PRN
Status: DISCONTINUED | OUTPATIENT
Start: 2023-07-20 | End: 2023-07-20 | Stop reason: HOSPADM

## 2023-07-20 RX ORDER — FENTANYL CITRATE 0.05 MG/ML
50 INJECTION, SOLUTION INTRAMUSCULAR; INTRAVENOUS EVERY 5 MIN PRN
Status: DISCONTINUED | OUTPATIENT
Start: 2023-07-20 | End: 2023-07-21 | Stop reason: HOSPADM

## 2023-07-20 RX ADMIN — DIPHENHYDRAMINE HYDROCHLORIDE 12.5 MG: 50 INJECTION INTRAMUSCULAR; INTRAVENOUS at 12:37

## 2023-07-20 RX ADMIN — Medication 0.5 MG: at 10:13

## 2023-07-20 RX ADMIN — DEXAMETHASONE SODIUM PHOSPHATE 10 MG: 4 INJECTION, SOLUTION INTRA-ARTICULAR; INTRALESIONAL; INTRAMUSCULAR; INTRAVENOUS; SOFT TISSUE at 10:22

## 2023-07-20 RX ADMIN — PROPOFOL 180 MCG/KG/MIN: 10 INJECTION, EMULSION INTRAVENOUS at 10:16

## 2023-07-20 RX ADMIN — FENTANYL CITRATE 25 MCG: 0.05 INJECTION, SOLUTION INTRAMUSCULAR; INTRAVENOUS at 11:41

## 2023-07-20 RX ADMIN — FENTANYL CITRATE 25 MCG: 0.05 INJECTION, SOLUTION INTRAMUSCULAR; INTRAVENOUS at 12:05

## 2023-07-20 RX ADMIN — GLYCOPYRROLATE 0.6 MG: 0.2 INJECTION, SOLUTION INTRAMUSCULAR; INTRAVENOUS at 11:02

## 2023-07-20 RX ADMIN — OXYCODONE HYDROCHLORIDE 5 MG: 5 TABLET ORAL at 13:05

## 2023-07-20 RX ADMIN — PROPOFOL 200 MG: 10 INJECTION, EMULSION INTRAVENOUS at 10:13

## 2023-07-20 RX ADMIN — CLINDAMYCIN PHOSPHATE 600 MG: 600 INJECTION, SOLUTION INTRAVENOUS at 09:16

## 2023-07-20 RX ADMIN — FENTANYL CITRATE 25 MCG: 0.05 INJECTION, SOLUTION INTRAMUSCULAR; INTRAVENOUS at 12:18

## 2023-07-20 RX ADMIN — SODIUM CHLORIDE, SODIUM LACTATE, POTASSIUM CHLORIDE, CALCIUM CHLORIDE: 600; 310; 30; 20 INJECTION, SOLUTION INTRAVENOUS at 09:16

## 2023-07-20 RX ADMIN — LIDOCAINE HYDROCHLORIDE 3 ML: 20 INJECTION, SOLUTION INFILTRATION; PERINEURAL at 10:13

## 2023-07-20 RX ADMIN — KETAMINE HYDROCHLORIDE 20 MG: 10 INJECTION INTRAMUSCULAR; INTRAVENOUS at 10:13

## 2023-07-20 RX ADMIN — MAGNESIUM SULFATE HEPTAHYDRATE 4 G: 40 INJECTION, SOLUTION INTRAVENOUS at 10:07

## 2023-07-20 RX ADMIN — GLYCOPYRROLATE 0.2 MG: 0.2 INJECTION, SOLUTION INTRAMUSCULAR; INTRAVENOUS at 10:13

## 2023-07-20 RX ADMIN — ONDANSETRON 4 MG: 2 INJECTION INTRAMUSCULAR; INTRAVENOUS at 11:02

## 2023-07-20 RX ADMIN — FENTANYL CITRATE 100 MCG: 50 INJECTION, SOLUTION INTRAMUSCULAR; INTRAVENOUS at 10:13

## 2023-07-20 RX ADMIN — Medication 4 MG: at 11:02

## 2023-07-20 RX ADMIN — FENTANYL CITRATE 25 MCG: 0.05 INJECTION, SOLUTION INTRAMUSCULAR; INTRAVENOUS at 11:53

## 2023-07-20 RX ADMIN — GLYCOPYRROLATE 0.2 MG: 0.2 INJECTION, SOLUTION INTRAMUSCULAR; INTRAVENOUS at 10:41

## 2023-07-20 RX ADMIN — Medication 25 MG: at 10:13

## 2023-07-20 RX ADMIN — ACETAMINOPHEN 975 MG: 325 TABLET ORAL at 09:05

## 2023-07-20 NOTE — ANESTHESIA CARE TRANSFER NOTE
Patient: Anahy Urena    Procedure: Procedure(s):  LAPAROSCOPY WITH LEFT SALPINGO OOPHORECTOMY RIGHT SALPINGECTOMY,HYSTEROSCOPY, WITH DILATION AND CURETTAGE, ENDOMETRIAL ABLATION, REPAIR OF CERVICAL LACERATION       Diagnosis: Endometriosis [N80.9]  Encounter for female sterilization procedure [Z30.2]  Diagnosis Additional Information: No value filed.    Anesthesia Type:   General     Note:    Oropharynx: oropharynx clear of all foreign objects and spontaneously breathing  Level of Consciousness: drowsy  Oxygen Supplementation: face mask  Level of Supplemental Oxygen (L/min / FiO2): 6  Independent Airway: airway patency satisfactory and stable  Dentition: dentition unchanged  Vital Signs Stable: post-procedure vital signs reviewed and stable  Report to RN Given: handoff report given  Patient transferred to: PACU    Handoff Report: Identifed the Patient, Identified the Reponsible Provider, Reviewed the pertinent medical history, Discussed the surgical course, Reviewed Intra-OP anesthesia mangement and issues during anesthesia, Set expectations for post-procedure period and Allowed opportunity for questions and acknowledgement of understanding      Vitals:  Vitals Value Taken Time   /56 07/20/23 1116   Temp 97.4  F (36.3  C) 07/20/23 1116   Pulse 64 07/20/23 1119   Resp 16 07/20/23 1116   SpO2 100 % 07/20/23 1119   Vitals shown include unvalidated device data.    Electronically Signed By: SASHA Anne CRNA  July 20, 2023  11:22 AM

## 2023-07-20 NOTE — ANESTHESIA PREPROCEDURE EVALUATION
"Anesthesia Pre-Procedure Evaluation    Patient: Anahy Urena   MRN: [id number] : 1980        Procedure : Procedure(s):  LAPAROSCOPY WITH LEFT SALPINGO OOPHORECTOMY RIGHT SALPINGECTOMY,HYSTEROSCOPY, WITH DILATION AND CURETTAGE, ENDOMETRIAL ABLATION  HYSTEROSCOPY, WITH DILATION AND CURETTAGE, ENDOMETRIAL ABLATION          Past Medical History:   Diagnosis Date     Anemia      Anxiety      Arthritis 1/15/2022    RA is negative     Asthma      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     Endometriosis      Hypertension 5/15/2022     Major depression, single episode 2023     PCOS (polycystic ovarian syndrome)      Thyroid disease 8/15/2022    cancer and follow-up      Past Surgical History:   Procedure Laterality Date     ABDOMEN SURGERY  2020    laparoscopy on ovaries     ANKLE SURGERY       BIOPSY  neck      BRACHIAL PLEXUS EXPLORATION       ENT SURGERY      neck tumor     EYE SURGERY       GYN SURGERY       HEAD & NECK SURGERY       LAPAROSCOPIC ENDOMETRIOSIS FULGURATION       ORTHOPEDIC SURGERY  2022     OK HYSTEROSCOPY,W/ENDO BX N/A 2020    Procedure: HYSTEROSCOPY WITH DILATION AND CURETTAGE, CERVICAL POLYPECTOMY AND PLACEMENT OF MIRENA INTRAUTERINE DEVICE;  Surgeon: Jossy Rodriguez MD;  Location: [facility];  Service: Gynecology     TEAR DUCT SURGERY        Allergies   Allergen Reactions     Ketorolac Hives     Other reaction(s): *Unknown, Other (see comments)     Amoxicillin Other (See Comments)     Per patient, she took amoxicillin chronically and it is no longer effective     Ceftriaxone Itching     Hydrocodone-Acetaminophen      Other reaction(s): Unknown     Hydroxyzine Other (See Comments)     Per pt \" I turn into a zombie\"         Ketorolac Tromethamine Other (See Comments)     Letrozole Other (See Comments)     Asthma attack per pt     Meloxicam Other (See Comments)     Tizanidine Other (See Comments)     Vicodin [Hydrocodone-Acetaminophen] " Hives      Social History     Tobacco Use     Smoking status: Never     Smokeless tobacco: Never   Substance Use Topics     Alcohol use: Yes     Comment: Socially      Wt Readings from Last 1 Encounters:   07/20/23 79 kg (174 lb 3.2 oz)        Anesthesia Evaluation   Pt has had prior anesthetic. Type: General.    History of anesthetic complications (very challening pain control after last few procedures)       ROS/MED HX  ENT/Pulmonary:  - neg pulmonary ROS   (+) Intermittent, asthma     Neurologic: Comment: Chronic fatigue - neg neurologic ROS     Cardiovascular:  - neg cardiovascular ROS   (+) hypertension-----    METS/Exercise Tolerance: >4 METS    Hematologic:  - neg hematologic  ROS   (+) anemia,     Musculoskeletal:  - neg musculoskeletal ROS     GI/Hepatic:  - neg GI/hepatic ROS   (+) GERD,     Renal/Genitourinary:  - neg Renal ROS     Endo:  - neg endo ROS   (+) thyroid problem, hypothyroidism postsurgical,     Psychiatric/Substance Use:  - neg psychiatric ROS   (+) psychiatric history anxiety and depression     Infectious Disease: Comment: Long covid - neg infectious disease ROS     Malignancy:  - neg malignancy ROS     Other:  - neg other ROS          Physical Exam    Airway        Mallampati: II   TM distance: > 3 FB   Neck ROM: full   Mouth opening: > 3 cm    Respiratory Devices and Support         Dental       (+) Minor Abnormalities - some fillings, tiny chips      Cardiovascular   cardiovascular exam normal          Pulmonary   pulmonary exam normal                OUTSIDE LABS:  CBC:   Lab Results   Component Value Date    WBC 9.7 07/17/2023    WBC 7.3 08/16/2021    HGB 13.5 07/17/2023    HGB 11.8 08/16/2021    HCT 40.9 07/17/2023    HCT 35.6 08/16/2021     07/17/2023     08/16/2021     BMP:   Lab Results   Component Value Date     07/17/2023     08/16/2021    POTASSIUM 4.1 07/17/2023    POTASSIUM 3.7 08/16/2021    CHLORIDE 103 07/17/2023    CHLORIDE 109 (H) 08/16/2021    CO2  20 (L) 07/17/2023    CO2 21 (L) 08/16/2021    BUN 11.6 07/17/2023    BUN 10 08/16/2021    CR 0.75 07/17/2023    CR 0.79 08/16/2021    GLC 84 07/17/2023    GLC 94 08/16/2021     COAGS: No results found for: PTT, INR, FIBR  POC:   Lab Results   Component Value Date    HCG Negative 07/20/2023    HCGS Negative 08/11/2021     HEPATIC: No results found for: ALBUMIN, PROTTOTAL, ALT, AST, GGT, ALKPHOS, BILITOTAL, BILIDIRECT, TRA  OTHER:   Lab Results   Component Value Date    LACT 0.8 08/11/2021    A1C 5.3 04/04/2023    CHINYERE 8.9 07/17/2023    MAG 2.2 08/11/2021    TSH 0.13 (L) 07/17/2023    T4 1.65 07/17/2023    CRP 0.8 (H) 08/16/2021    SED 13 07/17/2023       Anesthesia Plan    ASA Status:  3   NPO Status:  NPO Appropriate    Anesthesia Type: General.     - Airway: ETT   Induction: Propofol, Intravenous.   Maintenance: TIVA.        Consents    Anesthesia Plan(s) and associated risks, benefits, and realistic alternatives discussed. Questions answered and patient/representative(s) expressed understanding.    - Discussed:     - Discussed with:  Patient         Postoperative Care    Pain management: Multi-modal analgesia.   PONV prophylaxis: Ondansetron (or other 5HT-3), Dexamethasone or Solumedrol     Comments:    Other Comments: Reviewed anesthetic options and risks, including risk of dental trauma. Patient agrees to proceed.     PO tylenol  IV magnesium, dilaudid  Patient refuses toradol              Eugene French MD

## 2023-07-20 NOTE — OP NOTE
Laparoscopy    Name: Anahy Urena     MRN: [id number]      DATE OF SERVICE: 7/20/2023     PREOPERATIVE DIAGNOSIS: Chronic pelvic pain, endometriosis, menorrhagia    POSTOPERATIVE DIAGNOSIS: Same    PROCEDURES: laparoscopy left salpingo-oophorectomy, right salpingectomy  Hysteroscopy with D&C and endometrial ablation  Repair of small cervical laceration        ANESTHESIA: general     ESTIMATED BLOOD LOSS: 15    HISTORY OF PRESENT ILLNESS:   Anahy Urena is a 43 year old  year old female with history of chronic pelvic pain, endometriosis, left lower quadrant pain.  In addition she had menorrhagia.  We reviewed as an outpatient basis multiple imaging multiple attempts for medical management and the conclusion planned surgical management with laparoscopy left salpingo-oophorectomy and right salpingectomy.  She understood that even if the left did not have pathology she desired it to be removed as her pain source was always on the left side.  We reviewed endometrial ablation as treatment for menorrhagia as well.         PROCEDURE:  She was met preoperatively, where we discussed the procedure and the risks associated with the procedure. She understood these to be, but not limited to injury to adjacent organs including bladder, bowel, ureter, infection and bleeding. Patient signed consent and was brought back to the operating room in stable condition.     Patient underwent induction of a general anesthetic, she was carefully prepped and draped for the procedure. Timeout was performed. Bladder was drained with a Bergeron catheter, uterine manipulator placed into the uterus.     Attention was turned to the abdomen. An incision above the umbilicus. A Veress needle was introduced with a 2 pop technique, saline drop test confirmed adequate placement. Opening pressure was 3-4. Insufflation was now done until 15mm of pressure were established. A 5 nonbladed trocar was placed above the umbilicus. Two more port sights were place  in the right and left lower quadrants under direct visualization.  On the right this was a 5 mm nonbladed trocar.  On the left it was a 10 mm nonbladed trocar.  Trandelenburg assistance was then used.     The procedure began with identifying the anatomy.  Normal appearance to the uterus and adnexa was noted.   The IP ligament on the left was identified.  The ureter was away from the operative field.  The IP ligament was coagulated x2 and cut with a 5 LigaSure device.  Continued advancement to the ovarian ligament was performed and the left tube and ovary then were placed in the cul-de-sac.  The right fallopian tube was now removed in its entirety.  Inspection of the rest of the pelvis appeared without evidence of endometriosis.  Normal liver edge.  Normal bowel otherwise.    A 10 Endobag was placed in the left lower quadrant and the specimens were removed without difficulty.  They were sent separately to pathology.  The Raymundo-Daija fascial closure device was used to close the fascia at the left lower quadrant site.    Stasis was assured    The trocars were then removed and the gas was removed from the abdomen. Skin was closed with monocryl suture. Steri-Strips applied.    Tension was turned to the lower field.  Dilation of the cervix continued.  The single-tooth tenaculum caused a tear on the cervix but was easily identified.   Next a diagnostic hysteroscope was placed in the uterus.  Good visualization was seen of the uterine cavity.  There was abundant amount of fluffy tissue which was cleared out with the MyoSure lite device.     Curettage was then performed of all 4 quadrants of the uterus and specimens were prepared for pathology.     Next the NovaSure endometrial ablation was placed into the cavity and it passed cavity assessments after measurements were obtained.  The device was enabled and NovaSure endometrial ablation occurred for a total time of 1 minute 6 seconds.     At this junction replacement of  the hysteroscope showed a nice charring effect to the endometrium.  No active bleeding.     The cervix tear was evaluated and decision to put a figure-of-eight suture at the anterior lip of the cervix was made.  This was done with 3-0 Vicryl    Hemostasis assured     instruments were removed from vagina. No active bleeding was noted. Sponge and needle counts were correct X 2. She was taken to recovery in stable condition.     Jossy Rodriguez MD

## 2023-07-20 NOTE — PROGRESS NOTES
Per Dr French, patient was able to self administer 2 mg of Ativan from her own prescription bottle at 9:15 am for anxiety.    Suraj Almazan RN on 7/20/2023 at 9:15  AM

## 2023-07-20 NOTE — DISCHARGE INSTRUCTIONS
You received 975 mg of acetaminophen (Tylenol) at 9:05 am. Please do not take an additional dose of Tylenol until after 3:05 PM.    Do not exceed 4,000 mg of acetaminophen in a 24 hour period, keep in mind that acetaminophen can also be found in many over-the-counter cold medications as well as narcotics that may be given for pain.    You administered 2 mg of Ativan from your personal prescription bottle at 9:15 am.    If you have any questions or concerns regarding your procedure, please contact Dr. Rodriguez, her office number is [phone].

## 2023-07-20 NOTE — ANESTHESIA PROCEDURE NOTES
Airway       Patient location during procedure: OR       Procedure Start/Stop Times: 7/20/2023 10:16 AM  Staff -        Anesthesiologist:  Eugene French MD       CRNA: Ashley Arshad APRN CRNA       Performed By: CRNAIndications and Patient Condition       Indications for airway management: woo-procedural       Induction type:intravenous       Mask difficulty assessment: 1 - vent by mask    Final Airway Details       Final airway type: endotracheal airway       Successful airway: ETT - single and Oral  Endotracheal Airway Details        ETT size (mm): 7.0       Cuffed: yes       Successful intubation technique: direct laryngoscopy       DL Blade Type: Delacruz 2       Grade View of Cords: 1       Adjucts: stylet       Position: Left       Measured from: gums/teeth       Secured at (cm): 22       Bite block used: None    Post intubation assessment        Placement verified by: capnometry, equal breath sounds and chest rise        Number of attempts at approach: 1       Secured with: silk tape       Ease of procedure: easy       Dentition: Intact and Unchanged       Dental guard used and removed.    Medication(s) Administered   Medication Administration Time: 7/20/2023 10:16 AM

## 2023-07-20 NOTE — ANESTHESIA POSTPROCEDURE EVALUATION
Patient: Anahy Urena    Procedure: Procedure(s):  LAPAROSCOPY WITH LEFT SALPINGO OOPHORECTOMY RIGHT SALPINGECTOMY,HYSTEROSCOPY, WITH DILATION AND CURETTAGE, ENDOMETRIAL ABLATION, REPAIR OF CERVICAL LACERATION       Anesthesia Type:  General    Note:  Disposition: Outpatient   Postop Pain Control: Uneventful            Sign Out: Well controlled pain   PONV: No   Neuro/Psych: Uneventful            Sign Out: Acceptable/Baseline neuro status   Airway/Respiratory: Uneventful            Sign Out: Acceptable/Baseline resp. status   CV/Hemodynamics: Uneventful            Sign Out: Acceptable CV status; No obvious hypovolemia; No obvious fluid overload   Other NRE: NONE   DID A NON-ROUTINE EVENT OCCUR? No           Last vitals:  Vitals Value Taken Time   /68 07/20/23 1230   Temp 97.4  F (36.3  C) 07/20/23 1116   Pulse 72 07/20/23 1239   Resp 14 07/20/23 1230   SpO2 98 % 07/20/23 1239   Vitals shown include unvalidated device data.    Electronically Signed By: Eugene French MD  July 20, 2023  1:30 PM

## 2023-07-20 NOTE — PROGRESS NOTES
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 Rodriguez's office regarding oxycodone prescription for this patient.  Paper Rx not signed.  Requested electronically sent to [facility].  Patient's significant other is aware of situation and will call pharmacy in a little while and then call Dr. Rodriguez's office if Rx not at pharmacy.  Jennfier Gallegos RN on 7/20/2023 at 2:28 PM

## 2023-07-20 NOTE — H&P
H&P reviewed and no changes identified.  Reviewed risks of procedure in detail. Will proceed.  Jossy Rodriguez MD

## 2023-07-21 ENCOUNTER — MYC MEDICAL ADVICE (OUTPATIENT)
Dept: FAMILY MEDICINE | Facility: CLINIC | Age: 43
End: 2023-07-21
Payer: COMMERCIAL

## 2023-07-21 ENCOUNTER — TELEPHONE (OUTPATIENT)
Dept: FAMILY MEDICINE | Facility: CLINIC | Age: 43
End: 2023-07-21
Payer: COMMERCIAL

## 2023-07-21 DIAGNOSIS — G89.18 ACUTE POST-OPERATIVE PAIN: Primary | ICD-10-CM

## 2023-07-21 RX ORDER — HYDROMORPHONE HYDROCHLORIDE 2 MG/1
2-4 TABLET ORAL EVERY 4 HOURS PRN
Qty: 30 TABLET | Refills: 0 | Status: SHIPPED | OUTPATIENT
Start: 2023-07-21 | End: 2023-08-17

## 2023-07-21 NOTE — TELEPHONE ENCOUNTER
Patient calling.  Had same day gyn surgery 7/20/23.      1.  Was sent home on Tylenol, Advil, and Oxycodone 5mg #8 tabs.  Still in a lot of pain.  GYN provider wanted patient to follow up with primary care provider for her pain due to prior medical history.  Patient asking for a plan of care for pain.    2.  Since patient is a single mom and taking prescription pain medication, she reports she cannot be alone.  Her boyfriend (Alejandro Cardona) is staying with her to help with her care.  His employer ([employer]) needs a note for him to be off work.  Patient states she can get the note from her [employer] account and they will get the note to his employer.    Please advise, thanks.

## 2023-07-21 NOTE — LETTER
7/21/2023        To whom this concerns,       I am the PCP for Anahy Urena, and I am writing this letter in regards to Alejandro Cardona who is her significant other. She under went a surgical procedure on 7/20/2023 and Alejandro needs to be excused from work at this time to care for Ms. Urena.     Please let me know if you have any further questions.       Kelsie Griffith DNP, APRN, FNP        __________________________________  [facility]  [id number] [place] [id number]  Phone: [phone]

## 2023-07-21 NOTE — LETTER
7/21/2023      To whom this concerns,       I am the PCP for Ms. Anahy Urena, and she recently on 7/20/2023 had a surgical procedure done and she will need 24 hour monitoring by another adult. Her significant other Alejandro Cardona is excused from work at [employer] from 7/20/23-7/25/2023 to care for her.     Please let me know if you have any further questions        Sincerely,    Kelsie Griffith, DNP, APRN, FNP  __________________________________  [facility]  [address] [place] [id number]  Phone: [phone]

## 2023-07-24 ENCOUNTER — TELEPHONE (OUTPATIENT)
Dept: FAMILY MEDICINE | Facility: CLINIC | Age: 43
End: 2023-07-24
Payer: COMMERCIAL

## 2023-07-24 DIAGNOSIS — Z98.890 POST-OPERATIVE NAUSEA AND VOMITING: Primary | ICD-10-CM

## 2023-07-24 DIAGNOSIS — R11.2 POST-OPERATIVE NAUSEA AND VOMITING: Primary | ICD-10-CM

## 2023-07-24 RX ORDER — ONDANSETRON 4 MG/1
4 TABLET, FILM COATED ORAL EVERY 8 HOURS PRN
Qty: 60 TABLET | Refills: 1 | Status: SHIPPED | OUTPATIENT
Start: 2023-07-24 | End: 2023-08-17

## 2023-07-25 NOTE — TELEPHONE ENCOUNTER
Medication refilled and the quantity increased, will send message to patient   Kelsie Griffith NP on 7/24/2023 at 8:55 PM

## 2023-07-30 DIAGNOSIS — S06.0X9A CONCUSSION WITH LOSS OF CONSCIOUSNESS, INITIAL ENCOUNTER: ICD-10-CM

## 2023-07-31 RX ORDER — MODAFINIL 200 MG/1
200 TABLET ORAL DAILY
Qty: 30 TABLET | Refills: 3 | Status: SHIPPED | OUTPATIENT
Start: 2023-07-31 | End: 2023-08-17 | Stop reason: DRUGHIGH

## 2023-08-04 ENCOUNTER — E-VISIT (OUTPATIENT)
Dept: URGENT CARE | Facility: CLINIC | Age: 43
End: 2023-08-04
Payer: COMMERCIAL

## 2023-08-04 DIAGNOSIS — A08.4 VIRAL GASTROENTERITIS: Primary | ICD-10-CM

## 2023-08-04 PROCEDURE — 99207 PR NON-BILLABLE SERV PER CHARTING: CPT | Performed by: PHYSICIAN ASSISTANT

## 2023-08-04 RX ORDER — ONDANSETRON 4 MG/1
4 TABLET, ORALLY DISINTEGRATING ORAL EVERY 8 HOURS PRN
Qty: 10 TABLET | Refills: 0 | Status: SHIPPED | OUTPATIENT
Start: 2023-08-04

## 2023-08-04 NOTE — PATIENT INSTRUCTIONS
Anahy Urena,    Your symptoms are consistent with viral gastroenteritis.  It is most important that you maintain hydration- water, sports drinks, diluted fruit juice and broths are all good options. Avoid foods with high sugar content since they may make symptoms worse. I did send in a prescription for Zofran, an antinausea medication, to help you keep fluids down.     If your symptoms worsen, you develop a high fever, severe weakness or fainting, increased abdominal pain, blood in stool or vomit, go to the emergency room for further evaluation. If you do not have an improvement after 3 days of symptoms, go to urgent care for further evaluation.    Thanks for choosing us as a partner in your care,    Jane Car PA-C  [facility]

## 2023-08-09 ENCOUNTER — MYC MEDICAL ADVICE (OUTPATIENT)
Dept: FAMILY MEDICINE | Facility: CLINIC | Age: 43
End: 2023-08-09
Payer: COMMERCIAL

## 2023-08-09 ASSESSMENT — ENCOUNTER SYMPTOMS
FEVER: 1
LEG PAIN: 0
TINGLING: 1
DISTURBANCES IN COORDINATION: 1
HEADACHES: 1
SHORTNESS OF BREATH: 1
EXERCISE INTOLERANCE: 0
SINUS PAIN: 0
EYE PAIN: 0
HOT FLASHES: 0
DYSURIA: 0
JOINT SWELLING: 1
POSTURAL DYSPNEA: 0
MEMORY LOSS: 1
COUGH DISTURBING SLEEP: 1
WHEEZING: 0
SORE THROAT: 0
POLYPHAGIA: 0
NAIL CHANGES: 0
HYPERTENSION: 1
LIGHT-HEADEDNESS: 0
SKIN CHANGES: 0
TROUBLE SWALLOWING: 1
NAUSEA: 1
HEMATURIA: 0
DOUBLE VISION: 0
POOR WOUND HEALING: 0
DECREASED LIBIDO: 0
EYE REDNESS: 0
BACK PAIN: 1
COUGH: 1
SPUTUM PRODUCTION: 1
WEAKNESS: 0
HYPOTENSION: 0
NECK PAIN: 1
ABDOMINAL PAIN: 1
CHILLS: 1
CONSTIPATION: 1
RECTAL PAIN: 0
HEMOPTYSIS: 0
MYALGIAS: 1
HEARTBURN: 1
STIFFNESS: 1
LOSS OF CONSCIOUSNESS: 1
ALTERED TEMPERATURE REGULATION: 0
DIFFICULTY URINATING: 0
SINUS CONGESTION: 1
EYE WATERING: 0
MUSCLE WEAKNESS: 1
TREMORS: 0
FATIGUE: 1
BLOOD IN STOOL: 0
WEIGHT LOSS: 1
WEIGHT GAIN: 0
SLEEP DISTURBANCES DUE TO BREATHING: 0
SEIZURES: 0
SNORES LOUDLY: 0
DYSPNEA ON EXERTION: 0
POLYDIPSIA: 0
DECREASED APPETITE: 1
VOMITING: 1
SMELL DISTURBANCE: 0
NUMBNESS: 0
BOWEL INCONTINENCE: 0
PALPITATIONS: 1
SYNCOPE: 0
INCREASED ENERGY: 0
MUSCLE CRAMPS: 1
ARTHRALGIAS: 1
NECK MASS: 0
SPEECH CHANGE: 0
ORTHOPNEA: 0
BLOATING: 1
NIGHT SWEATS: 1
DIARRHEA: 1
HOARSE VOICE: 1
HALLUCINATIONS: 0
DIZZINESS: 1
PARALYSIS: 0
FLANK PAIN: 0
TASTE DISTURBANCE: 1
JAUNDICE: 0

## 2023-08-09 ASSESSMENT — PATIENT HEALTH QUESTIONNAIRE - PHQ9
10. IF YOU CHECKED OFF ANY PROBLEMS, HOW DIFFICULT HAVE THESE PROBLEMS MADE IT FOR YOU TO DO YOUR WORK, TAKE CARE OF THINGS AT HOME, OR GET ALONG WITH OTHER PEOPLE: SOMEWHAT DIFFICULT
SUM OF ALL RESPONSES TO PHQ QUESTIONS 1-9: 2
SUM OF ALL RESPONSES TO PHQ QUESTIONS 1-9: 2

## 2023-08-09 ASSESSMENT — ANXIETY QUESTIONNAIRES
GAD7 TOTAL SCORE: 4
4. TROUBLE RELAXING: SEVERAL DAYS
6. BECOMING EASILY ANNOYED OR IRRITABLE: SEVERAL DAYS
GAD7 TOTAL SCORE: 4
IF YOU CHECKED OFF ANY PROBLEMS ON THIS QUESTIONNAIRE, HOW DIFFICULT HAVE THESE PROBLEMS MADE IT FOR YOU TO DO YOUR WORK, TAKE CARE OF THINGS AT HOME, OR GET ALONG WITH OTHER PEOPLE: NOT DIFFICULT AT ALL
2. NOT BEING ABLE TO STOP OR CONTROL WORRYING: NOT AT ALL
1. FEELING NERVOUS, ANXIOUS, OR ON EDGE: SEVERAL DAYS
3. WORRYING TOO MUCH ABOUT DIFFERENT THINGS: NOT AT ALL
7. FEELING AFRAID AS IF SOMETHING AWFUL MIGHT HAPPEN: NOT AT ALL
5. BEING SO RESTLESS THAT IT IS HARD TO SIT STILL: SEVERAL DAYS

## 2023-08-11 ASSESSMENT — ASTHMA QUESTIONNAIRES: ACT_TOTALSCORE: 22

## 2023-08-16 ENCOUNTER — VIRTUAL VISIT (OUTPATIENT)
Dept: PHYSICAL MEDICINE AND REHAB | Facility: CLINIC | Age: 43
End: 2023-08-16
Payer: COMMERCIAL

## 2023-08-16 DIAGNOSIS — S06.0X9A CONCUSSION WITH LOSS OF CONSCIOUSNESS, INITIAL ENCOUNTER: Primary | ICD-10-CM

## 2023-08-16 DIAGNOSIS — U09.9 LONG COVID: ICD-10-CM

## 2023-08-16 PROCEDURE — 99213 OFFICE O/P EST LOW 20 MIN: CPT | Mod: VID | Performed by: PHYSICAL MEDICINE & REHABILITATION

## 2023-08-16 RX ORDER — MODAFINIL 100 MG/1
100 TABLET ORAL DAILY
Qty: 30 TABLET | Refills: 3 | Status: SHIPPED | OUTPATIENT
Start: 2023-08-16 | End: 2023-09-06

## 2023-08-16 RX ORDER — AMANTADINE HYDROCHLORIDE 100 MG/1
100 CAPSULE, GELATIN COATED ORAL DAILY
Qty: 30 CAPSULE | Refills: 3 | Status: SHIPPED | OUTPATIENT
Start: 2023-08-16 | End: 2023-08-25

## 2023-08-16 ASSESSMENT — ENCOUNTER SYMPTOMS
POLYDIPSIA: 0
LIGHT-HEADEDNESS: 0
FEVER: 1
SORE THROAT: 0
BACK PAIN: 1
EYE REDNESS: 0
NUMBNESS: 0
SHORTNESS OF BREATH: 1
FLANK PAIN: 0
DYSURIA: 0
ARTHRALGIAS: 1
NECK PAIN: 1
CONSTIPATION: 1
WEAKNESS: 0
SINUS PAIN: 0
SEIZURES: 0
POLYPHAGIA: 0
HEADACHES: 1
TROUBLE SWALLOWING: 1
TREMORS: 0
HEARTBURN: 1
FATIGUE: 1
COUGH: 1
VOMITING: 1
MYALGIAS: 1
DIZZINESS: 1
EYE PAIN: 0
PALPITATIONS: 1
ABDOMINAL PAIN: 1
NAUSEA: 1
DIARRHEA: 1
HEMATURIA: 0
HALLUCINATIONS: 0
RECTAL PAIN: 0
DIFFICULTY URINATING: 0
JOINT SWELLING: 1
WHEEZING: 0
CHILLS: 1

## 2023-08-16 ASSESSMENT — PAIN SCALES - GENERAL: PAINLEVEL: MILD PAIN (3)

## 2023-08-16 NOTE — PROGRESS NOTES
Anahy is a 43 year old who is being evaluated via a billable video visit.      How would you like to obtain your AVS? [facility]        Assessment and plan   Anahy Urena is a 43 year old female with history of thyroid tumor, s/p left salpingo-oophorectomy, right salpingo nephrectomy hysterectomy with dilation and curettage and endometrial ablation on 7-, and acute COVID infection in January 2022 leading to long COVID syndrome.  Her symptoms are mostly in the area of fatigue and brain fog.  Her objective findings include increased levels of ESR and CRP levels which are being closely monitored.    Inflammation:   She is on prednisone 5 mg, and continues to have higher levels of CRP and ESR albeit tapering down. She feels she is going in the right direction.   Agree with the amantadine at a lower dose of 100 mg once a day given the anxiety as a side effect. She notes that the BID dose was very helpful for fatigue.      Prednisone as per PCP and may benefit from slowly weaning off      Reviewed the mental health and her scales look very reasonable      Brain fog:  Agree with modafinil at a reduced dose of 100 mg once a day. Prescription given. Amantadine in the history of concussion with Long COVID has been found to be helpful   Prescription given to the patient.      Follow-up in the clinic in 3 months  Maryann Cao MD, [facility]   Department of Rehabilitation           HPI   History of COVID-19 infection:   Date of first symptoms: 4 January 2022  Initial symptoms were fever, sweats, she does not remember most of the 4 days. She has had a 60 pound unexplained weight gain since that time. In the interim also was diagnosed with thyroid cancer and underwent partial thyroidectomy.      Diagnosis: clinical and home kit  Hospitalization: No  Treatment: no treatment   Vaccine   She had vaccine plus booster. Pfizer         [facility] of thyroid tumor.   Current Symptoms:  She is on one tab of amantadine, 2 tabs is very  helpful for fatigue. She is on 5 mg prednisone, given by PCP. She notes she has not been unable to see.   Her labs of CRP and ESR have been high.   She grades her fatigue is high. She has been taking benadryl. Grades it at 2-3/10. She notes that she takes the change right after the amantadine. Since starting the amantadine, she is able to get dressed, take showers and play with her kids.   She is also modafinil at 200 mg, she has not taken it for 3 weeks.   She continues to have brain fog, though improving.      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   Brain fog is impacting her work efficiency.               8/9/2023     4:19 PM   PHQ Assesment Total Score(s)   PHQ-9 Score 2         8/9/2023     4:19 PM   YONY-7 Results   YONY 7 TOTAL SCORE 4 (minimal anxiety)   YONY-7 Total Score 4         8/9/2023     4:19 PM   PTSD Screen Score   Have you ever experienced this kind of event? Yes   PTSD Screen (Score of 3 or more suggests positive screen) 0         8/9/2023     4:31 PM   PROMIS-29   PROMIS Physical Function T-Score 48 (within normal limits)   PROMIS Anxiety T-Score 40 (within normal limits)   PROMIS Depression T-Score 41 (within normal limits)   PROMIS Fatigue T-Score 57 (mild)   PROMIS Sleep Disturbance T-Score 69 (moderate)   PROMIS Ability to Participate in Social Roles & Activities T-Score 45 (within normal limits)   PROMIS Pain Interference T-Score 42 (within normal limits)   PROMIS Pain Intensity 0           Past Medical History:   Diagnosis Date     Anemia      Anxiety      Arthritis 1/15/2022    RA is negative     Asthma      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     Endometriosis      Hypertension 5/15/2022     Major depression, single episode 02/07/2023     PCOS (polycystic ovarian syndrome)      Thyroid disease 8/15/2022    cancer and follow-up       Past Surgical History:   Procedure Laterality Date     ABDOMEN SURGERY  2001, 2020    laparoscopy on ovaries     ANKLE  SURGERY       BIOPSY  neck 1999     BRACHIAL PLEXUS EXPLORATION       ENT SURGERY  1999    neck tumor     EYE SURGERY  2015     GYN SURGERY       HEAD & NECK SURGERY  1999     LAPAROSCOPIC ENDOMETRIOSIS FULGURATION       LAPAROSCOPIC SALPINGECTOMY N/A 7/20/2023    Procedure: LAPAROSCOPY WITH LEFT SALPINGO OOPHORECTOMY RIGHT SALPINGECTOMY,HYSTEROSCOPY, WITH DILATION AND CURETTAGE, ENDOMETRIAL ABLATION, REPAIR OF CERVICAL LACERATION;  Surgeon: Jossy Rodriguez MD;  Location: [facility] [place]     ORTHOPEDIC SURGERY  2019, 2022     OK HYSTEROSCOPY,W/ENDO BX N/A 11/30/2020    Procedure: HYSTEROSCOPY WITH DILATION AND CURETTAGE, CERVICAL POLYPECTOMY AND PLACEMENT OF MIRENA INTRAUTERINE DEVICE;  Surgeon: Jossy Rodriguez MD;  Location: [facility] [place];  Service: Gynecology     TEAR DUCT SURGERY         Family History   Problem Relation Age of Onset     Hypertension Mother      Asthma Mother      Genetic Disorder Mother         Bone deformation     Hyperlipidemia Father      Diabetes Maternal Grandfather      Mental Illness Paternal Grandfather         OCD     Diabetes Other         My moms brother     Breast Cancer Other         My dads sister     Anxiety Disorder Other      Breast Cancer Other         My moms sister     Other Cancer Other         She had ovarian, cervical, brain, lymphoma     Prostate Cancer Other         My dads brother     Depression Other         My dads sister     Depression Daughter      Anxiety Disorder Daughter      Asthma Daughter      Depression Other         My moms sister     Thyroid Disease Other         Also her daughter has thyroid issues     Anxiety Disorder Brother      Mental Illness Brother         Torettes Syndrome, OCD, ODDS     Genetic Disorder Brother         Bone deformation     Anxiety Disorder Daughter      Asthma Daughter      Asthma Niece      Asthma Nephew        Social History     Tobacco Use     Smoking status: Never     Smokeless tobacco: Never   Vaping Use      Vaping Use: Never used   Substance Use Topics     Alcohol use: Yes     Comment: Socially     Drug use: Never         Current Outpatient Medications:      amantadine (SYMMETREL) 100 MG capsule, Take 1 capsule (100 mg) by mouth daily, Disp: 30 capsule, Rfl: 3     modafinil (PROVIGIL) 100 MG tablet, Take 1 tablet (100 mg) by mouth daily, Disp: 30 tablet, Rfl: 3     albuterol (PROAIR HFA;PROVENTIL HFA;VENTOLIN HFA) 90 mcg/actuation inhaler, Inhale 2 puffs into the lungs every 6 hours as needed , Disp: , Rfl:      amantadine (SYMMETREL) 100 MG capsule, Take 1 capsule (100 mg) by mouth 2 times daily, Disp: 60 capsule, Rfl: 1     clobetasol propionate (CLOBEX) 0.05 % external shampoo, 02/22/2022 Clobetasol Shampoo 0.05 %      02/22/2022  active, Disp: , Rfl:      eletriptan (RELPAX) 20 MG tablet, Take 1-2 tablets (20-40 mg) by mouth at onset of headache for migraine May repeat in 2 hours. Max 4 tablets/24 hours., Disp: 30 tablet, Rfl: 3     furosemide (LASIX) 20 MG tablet, Take 2 tablets in the AM and one tablet in the PM, Disp: 270 tablet, Rfl: 3     HYDROmorphone (DILAUDID) 2 MG tablet, Take 1-2 tablets (2-4 mg) by mouth every 4 hours as needed for pain, Disp: 30 tablet, Rfl: 0     levothyroxine (SYNTHROID/LEVOTHROID) 100 MCG tablet, Take 1 tablet (100 mcg) by mouth daily, Disp: 90 tablet, Rfl: 1     loratadine-pseudoePHEDrine (CLARITIN-D 24 HOUR)  MG 24 hr tablet, Take 1 tablet by mouth daily, Disp: 30 tablet, Rfl: 11     LORazepam (ATIVAN) 1 MG tablet, Take 1 tablet (1 mg) by mouth daily as needed for anxiety, Disp: 30 tablet, Rfl: 5     Loteprednol Etabonate (LOTEMAX SM) 0.38 % GEL, Apply 1 drop to eye, Disp: , Rfl:      modafinil (PROVIGIL) 200 MG tablet, Take 1 tablet (200 mg) by mouth daily, Disp: 30 tablet, Rfl: 3     ondansetron (ZOFRAN ODT) 4 MG ODT tab, Take 1 tablet (4 mg) by mouth every 8 hours as needed for nausea, Disp: 10 tablet, Rfl: 0     ondansetron (ZOFRAN) 4 MG tablet, Take 1 tablet (4 mg) by  mouth every 8 hours as needed for nausea, Disp: 60 tablet, Rfl: 1     oxyCODONE (ROXICODONE) 5 MG tablet, , Disp: , Rfl:      pantoprazole (PROTONIX) 40 MG EC tablet, Take 1 tablet (40 mg) by mouth daily Further refills per PCP, Disp: 90 tablet, Rfl: 0     predniSONE (DELTASONE) 5 MG tablet, Take 1 tablet (5 mg) by mouth daily, Disp: 60 tablet, Rfl: 3      Review of Systems   Constitutional: Positive for chills, fatigue and fever.   HENT: Positive for tinnitus and trouble swallowing. Negative for ear discharge, ear pain, hearing loss, mouth sores, nosebleeds, sinus pain and sore throat.    Eyes: Negative for pain and redness.   Respiratory: Positive for cough and shortness of breath. Negative for wheezing.    Cardiovascular: Positive for palpitations. Negative for chest pain.   Gastrointestinal: Positive for abdominal pain, constipation, diarrhea, heartburn, nausea and vomiting. Negative for rectal pain.   Endocrine: Negative for polydipsia and polyphagia.   Genitourinary: Negative for difficulty urinating, dyspareunia, dysuria, flank pain, genital sores, hematuria, urgency and vaginal discharge.   Musculoskeletal: Positive for arthralgias, back pain, joint swelling, myalgias and neck pain.   Skin: Positive for rash.   Neurological: Positive for dizziness and headaches. Negative for tremors, seizures, weakness, light-headedness and numbness.   Psychiatric/Behavioral: Negative for hallucinations.            Objective           Vitals:  No vitals were obtained today due to virtual visit.        Video-Visit Details    Type of service:  Video Visit     Originating Location (pt. Location): Home    Distant Location (provider location):  Off-site  Platform used for Video Visit: Tune[employer]  Answers submitted by the patient for this visit:  Patient Health Questionnaire (Submitted on 8/9/2023)  If you checked off any problems, how difficult have these problems made it for you to do your work, take care of things at home, or get  along with other people?: Somewhat difficult  PHQ9 TOTAL SCORE: 2  YONY-7 (Submitted on 8/9/2023)  YONY 7 TOTAL SCORE: 4  Symptoms you have experienced in the last 30 days (Submitted on 8/9/2023)  General Symptoms: Yes  Skin Symptoms: Yes  HENT Symptoms: Yes  EYE SYMPTOMS: Yes  HEART SYMPTOMS: Yes  LUNG SYMPTOMS: Yes  INTESTINAL SYMPTOMS: Yes  URINARY SYMPTOMS: Yes  GYNECOLOGIC SYMPTOMS: Yes  BREAST SYMPTOMS: No  SKELETAL SYMPTOMS: Yes  BLOOD SYMPTOMS: No  NERVOUS SYSTEM SYMPTOMS: Yes  MENTAL HEALTH SYMPTOMS: No  Please answer the questions below to tell us what conditions you are experiencing: (Submitted on 8/9/2023)  Congestion: Yes   Voice hoarseness: Yes  Tooth pain: No  Gum tenderness: Yes  Bleeding gums: Yes  Change in taste: Yes  Change in sense of smell: No  Dry mouth: No  Hearing aid used: No  Neck lump: No  Please answer the questions below to tell us what conditions you are experiencing: (Submitted on 8/9/2023)  Loss of appetite: Yes  Weight loss: Yes  Weight gain: No  Night sweats: Yes  Increased stress: Yes  Feeling hot or cold when others believe the temperature is normal: No  Loss of height: No  Post-operative complications: Yes  Surgical site pain: Yes  Change in or Loss of Energy: No  Hyperactivity: No  Confusion: No   (Submitted on 8/9/2023)  Changes in hair: No  Changes in moles/birth marks: No  Itching: Yes  Changes in nails: No  Acne: No  Hair in places you don't want it: No  Change in facial hair: No  Warts: No  Non-healing sores: No  Scarring: No  Flaking of skin: No  Color changes of hands/feet in cold : No  Sun sensitivity: No  Skin thickening: No  Please answer the questions below to tell us what conditions you are experiencing: (Submitted on 8/9/2023)  Vision loss: No  Dry eyes: Yes  Watery eyes: No  Eye bulging: No  Double vision: No  Flashing of lights: No  Spots: No  Floaters: No  Crossed eyes: No  Tunnel Vision: No  Yellowing of eyes: No  Please answer the questions below to tell us what  condition you are experiencing: (Submitted on 8/9/2023)  Pain in legs with walking: No  Trouble breathing while lying down: No  Fingers or toes appear blue: No  High blood pressure: Yes  Low blood pressure: No  Fainting: No  Murmurs: No  Pacemaker: No  Varicose veins: No  Edema or swelling: No  Wake up at night with shortness of breath: No  Exercise intolerance: No  Please answer the questions below to tell us what condition you are experiencing: (Submitted on 8/9/2023)  Sputum or phlegm: Yes  Coughing up blood: No  Snoring: No  Difficulty breathing on exertion: No  Nighttime Cough: Yes  Difficulty breathing when lying flat: No  Please answer the questions below to tell us what conditions you are experiencing: (Submitted on 8/9/2023)  Bloating: Yes  Blood in stool: No  Black stools: No  Fecal incontinence: No  Yellowing of skin or eyes: No  Vomit with blood: No  Change in stools: No  Please answer the questions below to tell us what condition you are experiencing: (Submitted on 8/9/2023)  Trouble holding urine or incontinence: No  Increased frequency of urination: No  Decreased frequency of urination: No  Frequent nighttime urination: No  Please answer the questions below to tell us what condition you are experiencing: (Submitted on 8/9/2023)  Bleeding or spotting between periods: Yes  Heavy or painful periods: No  Irregular periods: No  Hot flashes: No  Vaginal dryness: No  Reduced libido: No  Difficulty with sexual arousal: No  Post-menopausal bleeding: No  Please answer the questions below to tell us what condition you are experiencing: (Submitted on 8/9/2023)  Bone pain: No  Muscle cramps: Yes  Muscle weakness: Yes  Joint stiffness: Yes  Bone fracture: No  Please answer the questions below to tell us what condition you are experiencing: (Submitted on 8/9/2023)  Trouble with coordination: Yes  Fainting or black-out spells: Yes  Memory loss: Yes  Speech problems: No  Tingling: Yes  Difficulty walking:  No  Paralysis: No

## 2023-08-16 NOTE — NURSING NOTE
Is the patient currently in the state of [place]? YES    Visit mode:VIDEO    If the visit is dropped, the patient can be reconnected by: VIDEO VISIT: Text to cell phone: [phone]    Will anyone else be joining the visit? NO      How would you like to obtain your AVS? MyChart    Are changes needed to the allergy or medication list? NO    Reason for visit: RECHECK (3 mo)    Ximena Cormier on 8/16/2023 at 12:18 PM

## 2023-08-16 NOTE — LETTER
8/16/2023       RE: Anahy Urena  [address] [facility] [address]     Dear Colleague,    Thank you for referring your patient, Anahy Urena, to the [facility] PHYSICAL MEDICINE AND REHABILITATION CLINIC [place] at [facility]. Please see a copy of my visit note below.    Anahy is a 43 year old who is being evaluated via a billable video visit.      How would you like to obtain your AVS? MyChart        Assessment and plan   Anahy Urena is a 43 year old female with history of thyroid tumor, s/p left salpingo-oophorectomy, right salpingo nephrectomy hysterectomy with dilation and curettage and endometrial ablation on 7-, and acute COVID infection in January 2022 leading to long COVID syndrome.  Her symptoms are mostly in the area of fatigue and brain fog.  Her objective findings include increased levels of ESR and CRP levels which are being closely monitored.    Inflammation:   She is on prednisone 5 mg, and continues to have higher levels of CRP and ESR albeit tapering down. She feels she is going in the right direction.   Agree with the amantadine at a lower dose of 100 mg once a day given the anxiety as a side effect. She notes that the BID dose was very helpful for fatigue.      Prednisone as per PCP and may benefit from slowly weaning off      Reviewed the mental health and her scales look very reasonable      Brain fog:  Agree with modafinil at a reduced dose of 100 mg once a day. Prescription given. Amantadine in the history of concussion with Long COVID has been found to be helpful   Prescription given to the patient.      Follow-up in the clinic in 3 months  Maryann Cao MD, A   Department of Rehabilitation           HPI   History of COVID-19 infection:   Date of first symptoms: 4 January 2022  Initial symptoms were fever, sweats, she does not remember most of the 4 days. She has had a 60 pound unexplained weight gain  since that time. In the interim also was diagnosed with thyroid cancer and underwent partial thyroidectomy.      Diagnosis: clinical and home kit  Hospitalization: No  Treatment: no treatment   Vaccine   She had vaccine plus booster. Pfizer         PMH of thyroid tumor.   Current Symptoms:  She is on one tab of amantadine, 2 tabs is very helpful for fatigue. She is on 5 mg prednisone, given by PCP. She notes she has not been unable to see.   Her labs of CRP and ESR have been high.   She grades her fatigue is high. She has been taking benadryl. Grades it at 2-3/10. She notes that she takes the change right after the amantadine. Since starting the amantadine, she is able to get dressed, take showers and play with her kids.   She is also modafinil at 200 mg, she has not taken it for 3 weeks.   She continues to have brain fog, though improving.      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   Brain fog is impacting her work efficiency.               8/9/2023     4:19 PM   PHQ Assesment Total Score(s)   PHQ-9 Score 2         8/9/2023     4:19 PM   YONY-7 Results   YONY 7 TOTAL SCORE 4 (minimal anxiety)   YONY-7 Total Score 4         8/9/2023     4:19 PM   PTSD Screen Score   Have you ever experienced this kind of event? Yes   PTSD Screen (Score of 3 or more suggests positive screen) 0         8/9/2023     4:31 PM   PROMIS-29   PROMIS Physical Function T-Score 48 (within normal limits)   PROMIS Anxiety T-Score 40 (within normal limits)   PROMIS Depression T-Score 41 (within normal limits)   PROMIS Fatigue T-Score 57 (mild)   PROMIS Sleep Disturbance T-Score 69 (moderate)   PROMIS Ability to Participate in Social Roles & Activities T-Score 45 (within normal limits)   PROMIS Pain Interference T-Score 42 (within normal limits)   PROMIS Pain Intensity 0           Past Medical History:   Diagnosis Date    Anemia     Anxiety     Arthritis 1/15/2022    RA is negative    Asthma     Cancer (H) 8/15/2022     thryoid lobe removed    Endometriosis     Hypertension 5/15/2022    Major depression, single episode 02/07/2023    PCOS (polycystic ovarian syndrome)     Thyroid disease 8/15/2022    cancer and follow-up       Past Surgical History:   Procedure Laterality Date    ABDOMEN SURGERY  2001, 2020    laparoscopy on ovaries    ANKLE SURGERY      BIOPSY  neck 1999    BRACHIAL PLEXUS EXPLORATION      ENT SURGERY  1999    neck tumor    EYE SURGERY  2015    GYN SURGERY      HEAD & NECK SURGERY  1999    LAPAROSCOPIC ENDOMETRIOSIS FULGURATION      LAPAROSCOPIC SALPINGECTOMY N/A 7/20/2023    Procedure: LAPAROSCOPY WITH LEFT SALPINGO OOPHORECTOMY RIGHT SALPINGECTOMY,HYSTEROSCOPY, WITH DILATION AND CURETTAGE, ENDOMETRIAL ABLATION, REPAIR OF CERVICAL LACERATION;  Surgeon: Jossy Rodriguez MD;  Location: [facility]    ORTHOPEDIC SURGERY  2019, 2022    AR HYSTEROSCOPY,W/ENDO BX N/A 11/30/2020    Procedure: HYSTEROSCOPY WITH DILATION AND CURETTAGE, CERVICAL POLYPECTOMY AND PLACEMENT OF MIRENA INTRAUTERINE DEVICE;  Surgeon: Jossy Rodriguez MD;  Location: [facility];  Service: Gynecology    TEAR DUCT SURGERY         Family History   Problem Relation Age of Onset    Hypertension Mother     Asthma Mother     Genetic Disorder Mother         Bone deformation    Hyperlipidemia Father     Diabetes Maternal Grandfather     Mental Illness Paternal Grandfather         OCD    Diabetes Other         My moms brother    Breast Cancer Other         My dads sister    Anxiety Disorder Other     Breast Cancer Other         My moms sister    Other Cancer Other         She had ovarian, cervical, brain, lymphoma    Prostate Cancer Other         My dads brother    Depression Other         My dads sister    Depression Daughter     Anxiety Disorder Daughter     Asthma Daughter     Depression Other         My moms sister    Thyroid Disease Other         Also her daughter has thyroid issues    Anxiety Disorder Brother     Mental  Illness Brother         Torettes Syndrome, OCD, ODDS    Genetic Disorder Brother         Bone deformation    Anxiety Disorder Daughter     Asthma Daughter     Asthma Niece     Asthma Nephew        Social History     Tobacco Use    Smoking status: Never    Smokeless tobacco: Never   Vaping Use    Vaping Use: Never used   Substance Use Topics    Alcohol use: Yes     Comment: Socially    Drug use: Never         Current Outpatient Medications:     amantadine (SYMMETREL) 100 MG capsule, Take 1 capsule (100 mg) by mouth daily, Disp: 30 capsule, Rfl: 3    modafinil (PROVIGIL) 100 MG tablet, Take 1 tablet (100 mg) by mouth daily, Disp: 30 tablet, Rfl: 3    albuterol (PROAIR HFA;PROVENTIL HFA;VENTOLIN HFA) 90 mcg/actuation inhaler, Inhale 2 puffs into the lungs every 6 hours as needed , Disp: , Rfl:     amantadine (SYMMETREL) 100 MG capsule, Take 1 capsule (100 mg) by mouth 2 times daily, Disp: 60 capsule, Rfl: 1    clobetasol propionate (CLOBEX) 0.05 % external shampoo, 02/22/2022 Clobetasol Shampoo 0.05 %      02/22/2022  active, Disp: , Rfl:     eletriptan (RELPAX) 20 MG tablet, Take 1-2 tablets (20-40 mg) by mouth at onset of headache for migraine May repeat in 2 hours. Max 4 tablets/24 hours., Disp: 30 tablet, Rfl: 3    furosemide (LASIX) 20 MG tablet, Take 2 tablets in the AM and one tablet in the PM, Disp: 270 tablet, Rfl: 3    HYDROmorphone (DILAUDID) 2 MG tablet, Take 1-2 tablets (2-4 mg) by mouth every 4 hours as needed for pain, Disp: 30 tablet, Rfl: 0    levothyroxine (SYNTHROID/LEVOTHROID) 100 MCG tablet, Take 1 tablet (100 mcg) by mouth daily, Disp: 90 tablet, Rfl: 1    loratadine-pseudoePHEDrine (CLARITIN-D 24 HOUR)  MG 24 hr tablet, Take 1 tablet by mouth daily, Disp: 30 tablet, Rfl: 11    LORazepam (ATIVAN) 1 MG tablet, Take 1 tablet (1 mg) by mouth daily as needed for anxiety, Disp: 30 tablet, Rfl: 5    Loteprednol Etabonate (LOTEMAX SM) 0.38 % GEL, Apply 1 drop to eye, Disp: , Rfl:     modafinil  (PROVIGIL) 200 MG tablet, Take 1 tablet (200 mg) by mouth daily, Disp: 30 tablet, Rfl: 3    ondansetron (ZOFRAN ODT) 4 MG ODT tab, Take 1 tablet (4 mg) by mouth every 8 hours as needed for nausea, Disp: 10 tablet, Rfl: 0    ondansetron (ZOFRAN) 4 MG tablet, Take 1 tablet (4 mg) by mouth every 8 hours as needed for nausea, Disp: 60 tablet, Rfl: 1    oxyCODONE (ROXICODONE) 5 MG tablet, , Disp: , Rfl:     pantoprazole (PROTONIX) 40 MG EC tablet, Take 1 tablet (40 mg) by mouth daily Further refills per PCP, Disp: 90 tablet, Rfl: 0    predniSONE (DELTASONE) 5 MG tablet, Take 1 tablet (5 mg) by mouth daily, Disp: 60 tablet, Rfl: 3      Review of Systems   Constitutional: Positive for chills, fatigue and fever.   HENT: Positive for tinnitus and trouble swallowing. Negative for ear discharge, ear pain, hearing loss, mouth sores, nosebleeds, sinus pain and sore throat.    Eyes: Negative for pain and redness.   Respiratory: Positive for cough and shortness of breath. Negative for wheezing.    Cardiovascular: Positive for palpitations. Negative for chest pain.   Gastrointestinal: Positive for abdominal pain, constipation, diarrhea, heartburn, nausea and vomiting. Negative for rectal pain.   Endocrine: Negative for polydipsia and polyphagia.   Genitourinary: Negative for difficulty urinating, dyspareunia, dysuria, flank pain, genital sores, hematuria, urgency and vaginal discharge.   Musculoskeletal: Positive for arthralgias, back pain, joint swelling, myalgias and neck pain.   Skin: Positive for rash.   Neurological: Positive for dizziness and headaches. Negative for tremors, seizures, weakness, light-headedness and numbness.   Psychiatric/Behavioral: Negative for hallucinations.           Objective           Vitals:  No vitals were obtained today due to virtual visit.        Video-Visit Details    Type of service:  Video Visit     Originating Location (pt. Location): Home    Distant Location (provider location):   Off-site  Platform used for Video Visit: [place]  Answers submitted by the patient for this visit:  Patient Health Questionnaire (Submitted on 8/9/2023)  If you checked off any problems, how difficult have these problems made it for you to do your work, take care of things at home, or get along with other people?: Somewhat difficult  PHQ9 TOTAL SCORE: 2  YONY-7 (Submitted on 8/9/2023)  YONY 7 TOTAL SCORE: 4  Symptoms you have experienced in the last 30 days (Submitted on 8/9/2023)  General Symptoms: Yes  Skin Symptoms: Yes  HENT Symptoms: Yes  EYE SYMPTOMS: Yes  HEART SYMPTOMS: Yes  LUNG SYMPTOMS: Yes  INTESTINAL SYMPTOMS: Yes  URINARY SYMPTOMS: Yes  GYNECOLOGIC SYMPTOMS: Yes  BREAST SYMPTOMS: No  SKELETAL SYMPTOMS: Yes  BLOOD SYMPTOMS: No  NERVOUS SYSTEM SYMPTOMS: Yes  MENTAL HEALTH SYMPTOMS: No  Please answer the questions below to tell us what conditions you are experiencing: (Submitted on 8/9/2023)  Congestion: Yes   Voice hoarseness: Yes  Tooth pain: No  Gum tenderness: Yes  Bleeding gums: Yes  Change in taste: Yes  Change in sense of smell: No  Dry mouth: No  Hearing aid used: No  Neck lump: No  Please answer the questions below to tell us what conditions you are experiencing: (Submitted on 8/9/2023)  Loss of appetite: Yes  Weight loss: Yes  Weight gain: No  Night sweats: Yes  Increased stress: Yes  Feeling hot or cold when others believe the temperature is normal: No  Loss of height: No  Post-operative complications: Yes  Surgical site pain: Yes  Change in or Loss of Energy: No  Hyperactivity: No  Confusion: No   (Submitted on 8/9/2023)  Changes in hair: No  Changes in moles/birth marks: No  Itching: Yes  Changes in nails: No  Acne: No  Hair in places you don't want it: No  Change in facial hair: No  Warts: No  Non-healing sores: No  Scarring: No  Flaking of skin: No  Color changes of hands/feet in cold : No  Sun sensitivity: No  Skin thickening: No  Please answer the questions below to tell us what conditions  you are experiencing: (Submitted on 8/9/2023)  Vision loss: No  Dry eyes: Yes  Watery eyes: No  Eye bulging: No  Double vision: No  Flashing of lights: No  Spots: No  Floaters: No  Crossed eyes: No  Tunnel Vision: No  Yellowing of eyes: No  Please answer the questions below to tell us what condition you are experiencing: (Submitted on 8/9/2023)  Pain in legs with walking: No  Trouble breathing while lying down: No  Fingers or toes appear blue: No  High blood pressure: Yes  Low blood pressure: No  Fainting: No  Murmurs: No  Pacemaker: No  Varicose veins: No  Edema or swelling: No  Wake up at night with shortness of breath: No  Exercise intolerance: No  Please answer the questions below to tell us what condition you are experiencing: (Submitted on 8/9/2023)  Sputum or phlegm: Yes  Coughing up blood: No  Snoring: No  Difficulty breathing on exertion: No  Nighttime Cough: Yes  Difficulty breathing when lying flat: No  Please answer the questions below to tell us what conditions you are experiencing: (Submitted on 8/9/2023)  Bloating: Yes  Blood in stool: No  Black stools: No  Fecal incontinence: No  Yellowing of skin or eyes: No  Vomit with blood: No  Change in stools: No  Please answer the questions below to tell us what condition you are experiencing: (Submitted on 8/9/2023)  Trouble holding urine or incontinence: No  Increased frequency of urination: No  Decreased frequency of urination: No  Frequent nighttime urination: No  Please answer the questions below to tell us what condition you are experiencing: (Submitted on 8/9/2023)  Bleeding or spotting between periods: Yes  Heavy or painful periods: No  Irregular periods: No  Hot flashes: No  Vaginal dryness: No  Reduced libido: No  Difficulty with sexual arousal: No  Post-menopausal bleeding: No  Please answer the questions below to tell us what condition you are experiencing: (Submitted on 8/9/2023)  Bone pain: No  Muscle cramps: Yes  Muscle weakness: Yes  Joint  stiffness: Yes  Bone fracture: No  Please answer the questions below to tell us what condition you are experiencing: (Submitted on 8/9/2023)  Trouble with coordination: Yes  Fainting or black-out spells: Yes  Memory loss: Yes  Speech problems: No  Tingling: Yes  Difficulty walking: No  Paralysis: No        Again, thank you for allowing me to participate in the care of your patient.      Sincerely,    Maryann Cao MD

## 2023-08-17 ENCOUNTER — VIRTUAL VISIT (OUTPATIENT)
Dept: FAMILY MEDICINE | Facility: CLINIC | Age: 43
End: 2023-08-17
Payer: COMMERCIAL

## 2023-08-17 DIAGNOSIS — E87.6 HYPOKALEMIA: ICD-10-CM

## 2023-08-17 DIAGNOSIS — R60.9 SWELLING: Primary | ICD-10-CM

## 2023-08-17 DIAGNOSIS — U09.9 LONG COVID: ICD-10-CM

## 2023-08-17 DIAGNOSIS — C73 MALIGNANT NEOPLASM OF THYROID GLAND (H): ICD-10-CM

## 2023-08-17 DIAGNOSIS — M79.7 FIBROMYALGIA: ICD-10-CM

## 2023-08-17 DIAGNOSIS — K21.9 CHRONIC GERD: ICD-10-CM

## 2023-08-17 DIAGNOSIS — G93.32 CHRONIC FATIGUE SYNDROME: ICD-10-CM

## 2023-08-17 DIAGNOSIS — D75.839 THROMBOCYTOSIS: ICD-10-CM

## 2023-08-17 PROCEDURE — 99213 OFFICE O/P EST LOW 20 MIN: CPT | Mod: VID | Performed by: NURSE PRACTITIONER

## 2023-08-17 RX ORDER — NORETHINDRONE ACETATE AND ETHINYL ESTRADIOL 1.5-30(21)
1 KIT ORAL DAILY
COMMUNITY
Start: 2023-08-15 | End: 2023-09-06

## 2023-08-17 RX ORDER — METOLAZONE 2.5 MG/1
2.5-5 TABLET ORAL DAILY PRN
Qty: 20 TABLET | Refills: 0 | Status: SHIPPED | OUTPATIENT
Start: 2023-08-17 | End: 2023-09-06

## 2023-08-17 RX ORDER — PREDNISONE 5 MG/1
2.5 TABLET ORAL DAILY
Qty: 7 TABLET | Refills: 0 | Status: SHIPPED | OUTPATIENT
Start: 2023-08-17 | End: 2023-08-25

## 2023-08-17 RX ORDER — PANTOPRAZOLE SODIUM 40 MG/1
40 TABLET, DELAYED RELEASE ORAL 2 TIMES DAILY
Qty: 180 TABLET | Refills: 3 | Status: SHIPPED | OUTPATIENT
Start: 2023-08-17 | End: 2024-08-28

## 2023-08-17 RX ORDER — LORAZEPAM 1 MG/1
1 TABLET ORAL 3 TIMES DAILY PRN
Qty: 90 TABLET | Refills: 5 | Status: SHIPPED | OUTPATIENT
Start: 2023-08-17 | End: 2024-01-17

## 2023-08-17 NOTE — PROGRESS NOTES
"Anahy is a 43 year old who is being evaluated via a billable video visit.      How would you like to obtain your AVS? MyChart  If the video visit is dropped, the invitation should be resent by: Text to cell phone: [phone]  Will anyone else be joining your video visit? No          Assessment & Plan     Fibromyalgia  Will refill ativan for patient.   - LORazepam (ATIVAN) 1 MG tablet  Dispense: 90 tablet; Refill: 5    Chronic fatigue syndrome  - continue to follow with long covid clinic for fatigue, patient is on provigil and is helpful    Long COVID  - Follows with long covid clinic    Swelling  - discussed using medication only as needed for increased edema, explained she needs to be cautious with this medication due to kidney injury and or electrolyte disturbances that can happen especially with her taking lasix as well.   - metolazone (ZAROXOLYN) 2.5 MG tablet  Dispense: 20 tablet; Refill: 0    Chronic GERD  - medication refilled  - pantoprazole (PROTONIX) 40 MG EC tablet  Dispense: 180 tablet; Refill: 3    Thrombocytosis  Check labs, follows also with hematology and considered due to long covid  - CBC with platelets  - CRP, inflammation  - ESR: Erythrocyte sedimentation rate    Hypokalemia  - check labs  - Basic metabolic panel  (Ca, Cl, CO2, Creat, Gluc, K, Na, BUN)    Malignant neoplasm of thyroid gland (H)  - check labs, adjust medication as needed  - T4, free  - TSH     BMI:   Estimated body mass index is 28.99 kg/m  as calculated from the following:    Height as of 7/20/23: 1.651 m (5' 5\").    Weight as of 7/20/23: 79 kg (174 lb 3.2 oz).   Weight management plan: Discussed healthy diet and exercise guidelines        Kelsie Griffith NP  [facility]    Corwin Higginbotham is a 43 year old, presenting for the following health issues:  No chief complaint on file.        8/17/2023    11:08 AM   Additional Questions   Roomed by Tommy Rogers   Accompanied by self       History of " Present Illness       Reason for visit:  Med change post surgery    She eats 4 or more servings of fruits and vegetables daily.She consumes 1 sweetened beverage(s) daily.She exercises with enough effort to increase her heart rate 9 or less minutes per day.  She exercises with enough effort to increase her heart rate 3 or less days per week.   She is taking medications regularly.     Her BP is going up and her anxiety was going up she tried to do the mondofil up to 200 but with her Bp she dropped back down to 100 mg, she is also taking 1 ativan as her anxiety is going up. She is doing okay post surgery she is still having some bleeding and is contact with her OB/GYN and they are going to place her on oral contraceptives. She feels she is more swollen that she usually is and the lasix she takes is not really helping much any more.   Review of Systems   Constitutional, HEENT, cardiovascular, pulmonary, gi and gu systems are negative, except as otherwise noted.      Objective             Physical Exam   GENERAL: Healthy, alert and no distress  EYES: Eyes grossly normal to inspection.  No discharge or erythema, or obvious scleral/conjunctival abnormalities.  RESP: No audible wheeze, cough, or visible cyanosis.  No visible retractions or increased work of breathing.    SKIN: Visible skin clear. No significant rash, abnormal pigmentation or lesions.  NEURO: Cranial nerves grossly intact.  Mentation and speech appropriate for age.  PSYCH: Mentation appears normal, affect normal/bright, judgement and insight intact, normal speech and appearance well-groomed.          Video-Visit Details    Type of service:  Video Visit     Originating Location (pt. Location): Home    Distant Location (provider location):  On-site  Platform used for Video Visit: [email or web address]

## 2023-08-21 ENCOUNTER — MYC MEDICAL ADVICE (OUTPATIENT)
Dept: FAMILY MEDICINE | Facility: CLINIC | Age: 43
End: 2023-08-21
Payer: COMMERCIAL

## 2023-08-21 DIAGNOSIS — N17.9 AKI (ACUTE KIDNEY INJURY) (H): ICD-10-CM

## 2023-08-21 DIAGNOSIS — E87.6 HYPOKALEMIA: Primary | ICD-10-CM

## 2023-08-24 ENCOUNTER — TELEPHONE (OUTPATIENT)
Dept: NURSING | Facility: CLINIC | Age: 43
End: 2023-08-24

## 2023-08-24 ENCOUNTER — TELEPHONE (OUTPATIENT)
Dept: FAMILY MEDICINE | Facility: CLINIC | Age: 43
End: 2023-08-24

## 2023-08-24 ENCOUNTER — LAB (OUTPATIENT)
Dept: LAB | Facility: CLINIC | Age: 43
End: 2023-08-24
Payer: COMMERCIAL

## 2023-08-24 DIAGNOSIS — D75.839 THROMBOCYTOSIS: ICD-10-CM

## 2023-08-24 DIAGNOSIS — E87.6 HYPOKALEMIA: ICD-10-CM

## 2023-08-24 DIAGNOSIS — C73 MALIGNANT NEOPLASM OF THYROID GLAND (H): ICD-10-CM

## 2023-08-24 LAB
ANION GAP SERPL CALCULATED.3IONS-SCNC: 17 MMOL/L (ref 7–15)
BUN SERPL-MCNC: 21.6 MG/DL (ref 6–20)
CALCIUM SERPL-MCNC: 9.2 MG/DL (ref 8.6–10)
CHLORIDE SERPL-SCNC: 91 MMOL/L (ref 98–107)
CREAT SERPL-MCNC: 1.7 MG/DL (ref 0.51–0.95)
CRP SERPL-MCNC: 20.6 MG/L
DEPRECATED HCO3 PLAS-SCNC: 26 MMOL/L (ref 22–29)
ERYTHROCYTE [DISTWIDTH] IN BLOOD BY AUTOMATED COUNT: 12.7 % (ref 10–15)
ERYTHROCYTE [SEDIMENTATION RATE] IN BLOOD BY WESTERGREN METHOD: 27 MM/HR (ref 0–20)
GFR SERPL CREATININE-BSD FRML MDRD: 38 ML/MIN/1.73M2
GLUCOSE SERPL-MCNC: 89 MG/DL (ref 70–99)
HCT VFR BLD AUTO: 40.2 % (ref 35–47)
HGB BLD-MCNC: 14 G/DL (ref 11.7–15.7)
MCH RBC QN AUTO: 29.1 PG (ref 26.5–33)
MCHC RBC AUTO-ENTMCNC: 34.8 G/DL (ref 31.5–36.5)
MCV RBC AUTO: 84 FL (ref 78–100)
PLATELET # BLD AUTO: 527 10E3/UL (ref 150–450)
POTASSIUM SERPL-SCNC: 2.5 MMOL/L (ref 3.4–5.3)
RBC # BLD AUTO: 4.81 10E6/UL (ref 3.8–5.2)
SODIUM SERPL-SCNC: 134 MMOL/L (ref 136–145)
T4 FREE SERPL-MCNC: 2.01 NG/DL (ref 0.9–1.7)
TSH SERPL DL<=0.005 MIU/L-ACNC: 1.74 UIU/ML (ref 0.3–4.2)
WBC # BLD AUTO: 8.7 10E3/UL (ref 4–11)

## 2023-08-24 PROCEDURE — 84443 ASSAY THYROID STIM HORMONE: CPT

## 2023-08-24 PROCEDURE — 36415 COLL VENOUS BLD VENIPUNCTURE: CPT

## 2023-08-24 PROCEDURE — 85652 RBC SED RATE AUTOMATED: CPT

## 2023-08-24 PROCEDURE — 80048 BASIC METABOLIC PNL TOTAL CA: CPT

## 2023-08-24 PROCEDURE — 85027 COMPLETE CBC AUTOMATED: CPT

## 2023-08-24 PROCEDURE — 86140 C-REACTIVE PROTEIN: CPT

## 2023-08-24 PROCEDURE — 84439 ASSAY OF FREE THYROXINE: CPT

## 2023-08-24 NOTE — TELEPHONE ENCOUNTER
Patient asks that provider please address before going on leave due to complex health history:    See MyChart message,  Pt also calls to say she  would either like to go back up to 5 mg on prednisone or back to 200 mg Amantadine. She feels too much was changed at once and is not doing well on the lowered dose of both.     Pt also reports she has not stopped spotting since ablation, was put on birth control which has not worked. Pt has called into OB and is awaiting response.     Pt reports she is doing her blood work today.     Judy Li RN [facility]

## 2023-08-25 ENCOUNTER — TELEPHONE (OUTPATIENT)
Dept: FAMILY MEDICINE | Facility: CLINIC | Age: 43
End: 2023-08-25

## 2023-08-25 DIAGNOSIS — M79.7 FIBROMYALGIA: ICD-10-CM

## 2023-08-25 DIAGNOSIS — E87.6 HYPOKALEMIA: Primary | ICD-10-CM

## 2023-08-25 DIAGNOSIS — S06.0X9A CONCUSSION WITH LOSS OF CONSCIOUSNESS, INITIAL ENCOUNTER: ICD-10-CM

## 2023-08-25 DIAGNOSIS — G93.32 CHRONIC FATIGUE SYNDROME: ICD-10-CM

## 2023-08-25 DIAGNOSIS — U09.9 LONG COVID: ICD-10-CM

## 2023-08-25 RX ORDER — AMANTADINE HYDROCHLORIDE 100 MG/1
100 CAPSULE, GELATIN COATED ORAL 2 TIMES DAILY
Qty: 60 CAPSULE | Refills: 3 | Status: SHIPPED | OUTPATIENT
Start: 2023-08-25 | End: 2023-10-23

## 2023-08-25 RX ORDER — POTASSIUM CHLORIDE 1.5 G/1.58G
20 POWDER, FOR SOLUTION ORAL 3 TIMES DAILY
Qty: 15 PACKET | Refills: 0 | Status: SHIPPED | OUTPATIENT
Start: 2023-08-25 | End: 2023-08-30

## 2023-08-25 RX ORDER — PREDNISONE 5 MG/1
5 TABLET ORAL DAILY
Qty: 30 TABLET | Refills: 3 | Status: SHIPPED | OUTPATIENT
Start: 2023-08-25 | End: 2023-09-06

## 2023-08-25 RX ORDER — POTASSIUM CHLORIDE 1500 MG/1
20 TABLET, EXTENDED RELEASE ORAL 3 TIMES DAILY
Qty: 15 TABLET | Refills: 0 | Status: SHIPPED | OUTPATIENT
Start: 2023-08-25 | End: 2023-08-30

## 2023-08-25 NOTE — TELEPHONE ENCOUNTER
Lab called.  They have a critical lab for the [facility] provider.    We do not take critical labs for them.    Explained to lab they need to call the [facility] answering service .  I would have paged for the lab to the lab but no one was listed on call for that clinic in [place],    Kristen Mejia RN   08/24/23 7:22 PM  [facility] Nurse Advisor

## 2023-08-25 NOTE — PROGRESS NOTES
The patient was contacted at 6:50 PM and informed of her low potassium results of 2.5 she was advised to proceed to the emergency room or the emergency room. She was reluctant and asked if there was another alternative. She has 10 mEq potassium pills at home. She was told to take 2 tablets tonight and 2 tablets tomorrow and have her lab values checked again tomorrow or tonight

## 2023-08-25 NOTE — TELEPHONE ENCOUNTER
PRIOR AUTHORIZATION DENIED    Medication: POTASSIUM CHLORIDE 20 MEQ PO PACK  Insurance Company: [employer] [place] - Phone [phone] Fax [phone]  Denial Date: 8/25/2023  Denial Rational:     Appeal Information:     Patient Notified: No

## 2023-08-28 ENCOUNTER — LAB (OUTPATIENT)
Dept: LAB | Facility: CLINIC | Age: 43
End: 2023-08-28
Payer: COMMERCIAL

## 2023-08-28 DIAGNOSIS — N17.9 AKI (ACUTE KIDNEY INJURY) (H): ICD-10-CM

## 2023-08-28 DIAGNOSIS — E87.6 HYPOKALEMIA: ICD-10-CM

## 2023-08-28 LAB
ANION GAP SERPL CALCULATED.3IONS-SCNC: 12 MMOL/L (ref 7–15)
BUN SERPL-MCNC: 10.3 MG/DL (ref 6–20)
CALCIUM SERPL-MCNC: 9.1 MG/DL (ref 8.6–10)
CHLORIDE SERPL-SCNC: 105 MMOL/L (ref 98–107)
CREAT SERPL-MCNC: 0.83 MG/DL (ref 0.51–0.95)
DEPRECATED HCO3 PLAS-SCNC: 24 MMOL/L (ref 22–29)
GFR SERPL CREATININE-BSD FRML MDRD: 89 ML/MIN/1.73M2
GLUCOSE SERPL-MCNC: 82 MG/DL (ref 70–99)
POTASSIUM SERPL-SCNC: 4.1 MMOL/L (ref 3.4–5.3)
SODIUM SERPL-SCNC: 141 MMOL/L (ref 136–145)

## 2023-08-28 PROCEDURE — 36415 COLL VENOUS BLD VENIPUNCTURE: CPT

## 2023-08-28 PROCEDURE — 80048 BASIC METABOLIC PNL TOTAL CA: CPT

## 2023-08-31 ENCOUNTER — LAB (OUTPATIENT)
Dept: LAB | Facility: CLINIC | Age: 43
End: 2023-08-31
Payer: COMMERCIAL

## 2023-08-31 DIAGNOSIS — N17.9 AKI (ACUTE KIDNEY INJURY) (H): ICD-10-CM

## 2023-08-31 DIAGNOSIS — E87.6 HYPOKALEMIA: ICD-10-CM

## 2023-08-31 LAB
ANION GAP SERPL CALCULATED.3IONS-SCNC: 11 MMOL/L (ref 7–15)
BUN SERPL-MCNC: 12.5 MG/DL (ref 6–20)
CALCIUM SERPL-MCNC: 9.1 MG/DL (ref 8.6–10)
CHLORIDE SERPL-SCNC: 103 MMOL/L (ref 98–107)
CREAT SERPL-MCNC: 0.95 MG/DL (ref 0.51–0.95)
DEPRECATED HCO3 PLAS-SCNC: 24 MMOL/L (ref 22–29)
GFR SERPL CREATININE-BSD FRML MDRD: 76 ML/MIN/1.73M2
GLUCOSE SERPL-MCNC: 65 MG/DL (ref 70–99)
POTASSIUM SERPL-SCNC: 4.3 MMOL/L (ref 3.4–5.3)
SODIUM SERPL-SCNC: 138 MMOL/L (ref 136–145)

## 2023-08-31 PROCEDURE — 36415 COLL VENOUS BLD VENIPUNCTURE: CPT

## 2023-08-31 PROCEDURE — 80048 BASIC METABOLIC PNL TOTAL CA: CPT

## 2023-09-06 ENCOUNTER — VIRTUAL VISIT (OUTPATIENT)
Dept: FAMILY MEDICINE | Facility: CLINIC | Age: 43
End: 2023-09-06
Payer: COMMERCIAL

## 2023-09-06 DIAGNOSIS — G93.32 CHRONIC FATIGUE SYNDROME: ICD-10-CM

## 2023-09-06 DIAGNOSIS — I10 PRIMARY HYPERTENSION: ICD-10-CM

## 2023-09-06 DIAGNOSIS — S06.0X9A CONCUSSION WITH LOSS OF CONSCIOUSNESS, INITIAL ENCOUNTER: ICD-10-CM

## 2023-09-06 DIAGNOSIS — A08.4 VIRAL GASTROENTERITIS: ICD-10-CM

## 2023-09-06 DIAGNOSIS — N17.9 AKI (ACUTE KIDNEY INJURY) (H): ICD-10-CM

## 2023-09-06 DIAGNOSIS — U09.9 LONG COVID: ICD-10-CM

## 2023-09-06 DIAGNOSIS — N94.6 DYSMENORRHEA: ICD-10-CM

## 2023-09-06 DIAGNOSIS — M79.7 FIBROMYALGIA: ICD-10-CM

## 2023-09-06 DIAGNOSIS — E87.6 HYPOKALEMIA: Primary | ICD-10-CM

## 2023-09-06 PROCEDURE — 99213 OFFICE O/P EST LOW 20 MIN: CPT | Mod: VID | Performed by: NURSE PRACTITIONER

## 2023-09-06 RX ORDER — PREDNISONE 5 MG/1
5 TABLET ORAL DAILY
Qty: 30 TABLET | Refills: 4 | Status: SHIPPED | OUTPATIENT
Start: 2023-09-06 | End: 2024-03-04

## 2023-09-06 RX ORDER — MODAFINIL 100 MG/1
TABLET ORAL
Qty: 30 TABLET | Refills: 3
Start: 2023-09-06 | End: 2024-01-17

## 2023-09-06 RX ORDER — NORETHINDRONE ACETATE AND ETHINYL ESTRADIOL 1.5; .03 MG/1; MG/1
1 TABLET ORAL DAILY
COMMUNITY
End: 2023-10-07

## 2023-09-06 NOTE — PROGRESS NOTES
Anahy is a 43 year old who is being evaluated via a billable video visit.      How would you like to obtain your AVS? MyChart  If the video visit is dropped, the invitation should be resent by: Text to cell phone: [phone]  Will anyone else be joining your video visit? No          Assessment & Plan     Hypokalemia  Lab appointment on Friday, stop potassium as of now.     JALIL (acute kidney injury) (H)  Check labs and then see if JALIL has improved    Viral gastroenteritis  - seems to have resolved, will check labs    Concussion with loss of consciousness, initial encounter  - will increase provigil on days she feels like she needs  - modafinil (PROVIGIL) 100 MG tablet  Dispense: 30 tablet; Refill: 3      Kelsie Griffith NP  [facility]   Anahy is a 43 year old, presenting for the following health issues:  Recheck Medication        8/17/2023    11:08 AM   Additional Questions   Roomed by Tommy Rogers   Accompanied by self       History of Present Illness       Reason for visit:  Follow up for ER visit    She eats 4 or more servings of fruits and vegetables daily.She consumes 0 sweetened beverage(s) daily.She exercises with enough effort to increase her heart rate 10 to 19 minutes per day.  She exercises with enough effort to increase her heart rate 3 or less days per week.   She is taking medications regularly.         She is here for follow up for viral gastroenteritis and JALIL and dehydration and hypokalemia, she only took one pill of zarolxyn and that she was dehydrated from the viral gastroenteritis and also the recent surgery. She did have labs done and her kidney function and potassium levels have improved.     She is wondering about take an extra modafinil if needed on tough days.     She is doing well with take ativan and amantadine, she feels her energy is better.       Review of Systems   Constitutional, HEENT, cardiovascular, pulmonary, gi and gu systems are  negative, except as otherwise noted.      Objective           Vitals:  No vitals were obtained today due to virtual visit.    Physical Exam   GENERAL: Healthy, alert and no distress  EYES: Eyes grossly normal to inspection.  No discharge or erythema, or obvious scleral/conjunctival abnormalities.  RESP: No audible wheeze, cough, or visible cyanosis.  No visible retractions or increased work of breathing.    SKIN: Visible skin clear. No significant rash, abnormal pigmentation or lesions.  NEURO: Cranial nerves grossly intact.  Mentation and speech appropriate for age.  PSYCH: Mentation appears normal, affect normal/bright, judgement and insight intact, normal speech and appearance well-groomed.                Video-Visit Details    Type of service:  Video Visit     Originating Location (pt. Location): Home    Distant Location (provider location):  Off-site  Platform used for Video Visit: [email or web address]

## 2023-09-08 ENCOUNTER — LAB (OUTPATIENT)
Dept: LAB | Facility: CLINIC | Age: 43
End: 2023-09-08
Payer: COMMERCIAL

## 2023-09-08 ENCOUNTER — MYC MEDICAL ADVICE (OUTPATIENT)
Dept: FAMILY MEDICINE | Facility: CLINIC | Age: 43
End: 2023-09-08

## 2023-09-08 DIAGNOSIS — C73 MALIGNANT NEOPLASM OF THYROID GLAND (H): Primary | ICD-10-CM

## 2023-09-08 DIAGNOSIS — N17.9 AKI (ACUTE KIDNEY INJURY) (H): ICD-10-CM

## 2023-09-08 DIAGNOSIS — E87.6 HYPOKALEMIA: ICD-10-CM

## 2023-09-08 LAB
ANION GAP SERPL CALCULATED.3IONS-SCNC: 12 MMOL/L (ref 7–15)
BUN SERPL-MCNC: 15.8 MG/DL (ref 6–20)
CALCIUM SERPL-MCNC: 9.1 MG/DL (ref 8.6–10)
CHLORIDE SERPL-SCNC: 103 MMOL/L (ref 98–107)
CREAT SERPL-MCNC: 0.91 MG/DL (ref 0.51–0.95)
DEPRECATED HCO3 PLAS-SCNC: 24 MMOL/L (ref 22–29)
EGFRCR SERPLBLD CKD-EPI 2021: 80 ML/MIN/1.73M2
GLUCOSE SERPL-MCNC: 80 MG/DL (ref 70–99)
POTASSIUM SERPL-SCNC: 3.4 MMOL/L (ref 3.4–5.3)
SODIUM SERPL-SCNC: 139 MMOL/L (ref 136–145)

## 2023-09-08 PROCEDURE — 80048 BASIC METABOLIC PNL TOTAL CA: CPT

## 2023-09-08 PROCEDURE — 36415 COLL VENOUS BLD VENIPUNCTURE: CPT

## 2023-09-08 NOTE — PROGRESS NOTES
Us neck order placed for one year follow up from surgery  Kelsie Griffith NP on 9/8/2023 at 4:22 PM

## 2023-09-13 ENCOUNTER — TELEPHONE (OUTPATIENT)
Dept: PHYSICAL MEDICINE AND REHAB | Facility: CLINIC | Age: 43
End: 2023-09-13
Payer: COMMERCIAL

## 2023-09-13 NOTE — TELEPHONE ENCOUNTER
Health Call Center    Phone Message    May a detailed message be left on voicemail: yes     Reason for Call: Medication Question or concern regarding medication   Prescription Clarification  Name of Medication: modafinil (PROVIGIL) 100 MG tablet   Prescribing Provider:   Maryann Cao MD        Pharmacy:   [facility] - [facility] [id number]      What on the order needs clarification? Pharmacy needs to know the dose of the medication and also is stating that there is another Rx for this medication for 200 MG Please call Pharmacy back.      Action Taken: Message routed to:  [facility] ([facility] PMR    Travel Screening: Not Applicable

## 2023-09-14 ENCOUNTER — HOSPITAL ENCOUNTER (OUTPATIENT)
Dept: ULTRASOUND IMAGING | Facility: CLINIC | Age: 43
Discharge: HOME | End: 2023-09-14
Attending: NURSE PRACTITIONER | Admitting: NURSE PRACTITIONER
Payer: COMMERCIAL

## 2023-09-14 DIAGNOSIS — C73 MALIGNANT NEOPLASM OF THYROID GLAND (H): ICD-10-CM

## 2023-09-14 PROCEDURE — 76536 US EXAM OF HEAD AND NECK: CPT

## 2023-09-14 NOTE — TELEPHONE ENCOUNTER
RN Chart review notes that patient saw Dr. Cao on 8/16/23, and decreased her dosage of Modafinil from 200mg one tab daily to 100mg one tab daily, and this prescription was sent in to [facility].  On 9/6/23, patient saw FP provider, Kelsie Griffith NP, who changed the dosage of the Modafinil from 100mg one tab daily, may take additional tablet daily if needed for brain fog/fatigue, but this did not get electronically sent to pharmacy.      Writer returned call to [facility], informed them of above findings, and they confirmed that patient picked up refill on 8/16/23 under Dr. Cao's prescription, and did not receive a script (paper or electronic) from the NP.  They did cancel the 200 mg prescription on file.    Jordana Ordaz RN on 9/14/2023 at 12:26 PM

## 2023-09-14 NOTE — TELEPHONE ENCOUNTER
[facility] Call Center    Phone Message    May a detailed message be left on voicemail: yes     Reason for Call: Other: Pharmacy is calling requesting a call back for clarification on this medication. Please call back.      Action Taken: Message routed to:  [facility] ([facility]    Travel Screening: Not Applicable

## 2023-09-19 ENCOUNTER — VIRTUAL VISIT (OUTPATIENT)
Dept: ENDOCRINOLOGY | Facility: CLINIC | Age: 43
End: 2023-09-19
Attending: NURSE PRACTITIONER
Payer: COMMERCIAL

## 2023-09-19 ENCOUNTER — PRE VISIT (OUTPATIENT)
Dept: ENDOCRINOLOGY | Facility: CLINIC | Age: 43
End: 2023-09-19

## 2023-09-19 VITALS — WEIGHT: 153 LBS | BODY MASS INDEX: 25.49 KG/M2 | HEIGHT: 65 IN

## 2023-09-19 DIAGNOSIS — E89.0 HISTORY OF PARTIAL THYROIDECTOMY: ICD-10-CM

## 2023-09-19 DIAGNOSIS — C73 PAPILLARY THYROID CARCINOMA (H): Primary | ICD-10-CM

## 2023-09-19 DIAGNOSIS — Z79.52 ON CORTICOSTEROID THERAPY: ICD-10-CM

## 2023-09-19 PROCEDURE — 99417 PROLNG OP E/M EACH 15 MIN: CPT

## 2023-09-19 PROCEDURE — 99205 OFFICE O/P NEW HI 60 MIN: CPT | Mod: VID

## 2023-09-19 ASSESSMENT — PAIN SCALES - GENERAL: PAINLEVEL: NO PAIN (0)

## 2023-09-19 NOTE — LETTER
9/19/2023       RE: Anahy Urena  [address] [address]     Dear Colleague,    Thank you for referring your patient, Anahy Urena, to the [facility] [place] [facility]. Please see a copy of my visit note below.    Endocrine Consult Video visit note-    Attending Assessment/Plan :     Papillary thyroid carcinoma 0.4 cm, left. S/p left lobectomy and isthmusetomy 8/2022.    Agree she has likely had all the treatment she will ever need.   Agree this doesn't typically require levothyroxine treatment.  She is already on LT4   Labs to include Tg for baseline  See me in one year with neck and thyroid US prior     History of partial thyroidectomy.  She is on lT4 100 mcg/day.  TFTs were normal prior to treatment but she says she feels better on treatment.   OK to target normal TSH   It is unclear why TFTS are being measured so frequently    On corticosteroid prednisone 5 mg/day. This is being given by her PCP.  I will stay out of it.     Due to the COVID 19 pandemic this visit was a video visit in order to help prevent spread of infection in this patient and the general population. The patient gave verbal consent for the visit today. I have independently reviewed and interpreted labs, imaging as indicated.     Distant Location (provider location):  Off-site  Mode of Communication:  Video Conference via Intellicyt  Chart review/prep time 1  [id number]  Visit Start time  1745   Visit Stop time 1826   _90_ minutes spent on the date of the encounter doing chart review, history and exam, documentation and further activities as noted above.    Samantha Rangel MD    Chief complaint:  Anahy is a 43 year old female seen in consultation at the request of Zainab BAEZ CNP for history of partial thyroidectomy  I have reviewed Care Everywhere including [facility], [facility], [facility], [place],  lab reports, imaging reports  and provider notes as indicated.      HISTORY OF PRESENT ILLNESS    Anahy has been receiving endocrine care with Dr Mack Dai at [facility]. She last saw him 12/2/22.   She says she is not planning to continue to follow at [facility].      She recalls that she was having tests for her symptoms which included weight gain, night sweats and other symptoms. Specifically she recalls that she Gained 60 # in < 6 weeks, eating < 1000 kcal/day, got up to 190#    The first thyroid US was performed due to tenderness noted on exam of the neck.    8/16/22 FNAB cytology suspicious suggestive or PTC  8/26/22 left thyroid lobectomy and isthmusectomy ([facility], Dr Keith Adler)  Papillary thyroid  carcinoma 0.4 cm.  Postoperatively she had multiple ED visits and CT scans of the neck.  Dr Adler;s 8/30/22 indicated he believed she had a postoperative seroma.    LT4 was started by her PCP though she did not have postsurgical hypothyroidism.  She is currently on LT4 100 mcg/day. She recalls that she was able to lose weight and the night sweats resolved when she started thyroid hormone. Other symptoms also improved    The MAR indicates the following    prednisone started 2/28/2023.  She also recalls kenalog at some point , they are treating the ESR and CRP   [place] thyorid started 2/7/23  LT4 started 4/6/23     Endocrine relevant labs are as follows:  5/16/16 AMH 2.15, testosterone 5  5/30/17 AMH 2.62  5/25/22 TSH 0.99  6/1/22 covid 19 molecular negative  7/6/22 [facility] aldosterone 6.8, renin activity 0.287m free T4 0.84, T3 90, metanephrine 18.8 pg/ml, normetanephrine 60.5 pg/ml  8/16/22 1102 cortisol 6 TSH 1.2, free T4 1.1, HgbA1c 5.3%, 25OHD 55.8, B12 1093  8/25/22 TPO 0.5 (< 9 international unit(s)/ml)  10/6/22 TSH 1.78  12/2/22 TSH 1.4 , free T4 1.5  12/12/22 1025 cortisol 4.4 mcg/dL, TSH 2.1, 25OHD 50, DHEAS 55 mcg/dl ferritin 36, ESR 37, SPEP no monoclonal, TTG IgA < 1.2  2/2/23 [facility] 1337 cortisol 8.4 mcg/dl , TSH 0.706 , T3  100, CRP 1.44 (< 0.5 mg/dl), Na 141, K 3.4  4/4/23 TSH 0.77  5/22/23 TSH 0.69  7/17/23 TSH 0.13, free T4 1.65  8/24/23 TSH 1.74, free T4 2.01  8/25/23 [facility]  137, K 2.6, Cl  97, CO2 26, creatinine 1.17, glucose 104    Relevant imaging is as follows: (as read by me as it pertains to endocrine relevant organs)  5/18/22 CTA chest subtle heterogeneity noted thyroid isthmus and left lobe   7/14/22 CT head  8/4/22 thyroid US:([facility]):    left lobe nodule 1.2  x 1.4 x 1.8 hypoechoic  Left isthmus 0.7 X 0.4 x 0.6   8/25/22 thyroid US ([place]):   Left isthmus nodule 0.7 x 0.4 x 0.8 cm  Left lobe nodule  1.3 x 1.3 x 2  Left upper nodule  0.4 x 0.5 x 0.3 cm   8/26/22 surgery left thyroid   8/27/22 CT neck soft tissue with contrast:   Right thyroid normal  Left thyroid bed and isthmus  ? Edema    8/30/22 CT neck with contrast ([facility]) right thyroid normal.  Left thyroid bed/isthmus region has hypodense enlargement   9/14/23 US neck ([facility])- only viewable on [facility]    REVIEW OF SYSTEMS  Feeling OK , not back to precovid state  Getting older   Long time to recharge  Sleep at night mostly OK  No sleep apnea   Night sweats pretty much gone now  Weight is currently 163 (peak had been 210 last summer)  A little swelling which she treats with lasix 60; BP increases if she doesn't take lasix   No periods since the uterine ablation in July 2023--she also had one ovary removed; prior to that she had flow x 40 days   Osteoarthritis in CMS joints and in back ; had knee injections x years for this;   10 system ROS otherwise as per the HPI or negative    Past Medical History  Past Medical History:   Diagnosis Date    Abnormal uterine bleeding 2020    Acne vulgaris     Anemia 1988    Anxiety     Arthritis 1/15/2022    RA is negative    Asthma     Concussion 05/2007    Eczema 1993    Endometriosis     GERD (gastroesophageal reflux disease)     H/O partial thyroidectomy     HTN (hypertension)     Hypertension 5/15/2022    Infection  due to 2019 novel coronavirus 01/04/2022    Long COVID     Major depression, single episode 02/07/2023    papillary thyroid carcinoma 08/16/2022    left 0.4 cm    PCOS (polycystic ovarian syndrome)     Pink eye disease of both eyes 10/24/2018    Rosacea     Thrombocytosis      8/2021 dog bite -     Medications  Current Outpatient Medications   Medication Sig Dispense Refill    albuterol (PROAIR HFA;PROVENTIL HFA;VENTOLIN HFA) 90 mcg/actuation inhaler Inhale 2 puffs into the lungs every 6 hours as needed       amantadine (SYMMETREL) 100 MG capsule Take 1 capsule (100 mg) by mouth 2 times daily 60 capsule 3    clobetasol propionate (CLOBEX) 0.05 % external shampoo 02/22/2022 Clobetasol Shampoo 0.05 %      02/22/2022  active      eletriptan (RELPAX) 20 MG tablet Take 1-2 tablets (20-40 mg) by mouth at onset of headache for migraine May repeat in 2 hours. Max 4 tablets/24 hours. 30 tablet 3    furosemide (LASIX) 20 MG tablet Take 2 tablets in the AM and one tablet in the  tablet 3    ENEIDA 1.5/30 1.5-30 MG-MCG tablet Take 1 tablet by mouth daily      levothyroxine (SYNTHROID/LEVOTHROID) 100 MCG tablet Take 1 tablet (100 mcg) by mouth daily 90 tablet 1    loratadine-pseudoePHEDrine (CLARITIN-D 24 HOUR)  MG 24 hr tablet Take 1 tablet by mouth daily 30 tablet 11    LORazepam (ATIVAN) 1 MG tablet Take 1 tablet (1 mg) by mouth 3 times daily as needed for anxiety or sleep 90 tablet 5    Loteprednol Etabonate (LOTEMAX SM) 0.38 % GEL Apply 1 drop to eye      modafinil (PROVIGIL) 100 MG tablet Take one tablet by mouth once a day, may take additional tablet daily if needed for brain fog/fatigue 30 tablet 3    ondansetron (ZOFRAN ODT) 4 MG ODT tab Take 1 tablet (4 mg) by mouth every 8 hours as needed for nausea 10 tablet 0    pantoprazole (PROTONIX) 40 MG EC tablet Take 1 tablet (40 mg) by mouth 2 times daily Further refills per  tablet 3    predniSONE (DELTASONE) 5 MG tablet Take 1 tablet (5 mg) by mouth daily  "30 tablet 4       Allergies  Allergies   Allergen Reactions    Ketorolac Hives     Other reaction(s): *Unknown, Other (see comments)    Letrozole Other (See Comments), Rash and Shortness Of Breath     Asthma attack per pt    Other Reaction(s): Wheezing (Reselect Reaction)    Amoxicillin Other (See Comments)     Per patient, she took amoxicillin chronically and it is no longer effective    Ceftriaxone Itching    Hydrocodone-Acetaminophen      Other reaction(s): Unknown    Hydroxyzine Other (See Comments)     Per pt \" I turn into a zombie\"        Ketorolac Tromethamine Other (See Comments)    Meloxicam Other (See Comments)    Tizanidine Other (See Comments)    Vicodin [Hydrocodone-Acetaminophen] Hives       Family History  family history includes Anxiety Disorder in her brother, daughter, daughter, and another family member; Asthma in her daughter, daughter, mother, nephew, and niece; Breast Cancer in some other family members; Depression in her daughter and other family members; Diabetes in her maternal grandfather and another family member; Genetic Disorder in her brother and mother; Hyperlipidemia in her father; Hypertension in her mother; Mental Illness in her brother and paternal grandfather; Other Cancer in an other family member; Prostate Cancer in an other family member; Thyroid Disease in an other family member.    Social History  Social History     Tobacco Use    Smoking status: Never    Smokeless tobacco: Never   Vaping Use    Vaping Use: Never used   Substance Use Topics    Alcohol use: Yes     Comment: Socially    Drug use: Never     PCP Daylin Griffith  Was  and she sprained her ankles and wrist quite a bit.   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  BP Readings from Last 1 Encounters:   07/20/23 120/77      Pulse Readings from Last 1 Encounters:   07/20/23 76      Resp Readings from Last 1 Encounters:   07/20/23 14      Temp " "Readings from Last 1 Encounters:   07/20/23 97.4  F (36.3  C) (Temporal)      SpO2 Readings from Last 1 Encounters:   07/20/23 100%      Wt Readings from Last 1 Encounters:   09/19/23 69.4 kg (153 lb)      Ht Readings from Last 1 Encounters:   09/19/23 1.651 m (5' 5\")     GENERAL: no distress; I can see from mid chest up  SKIN: Visible skin clear. No significant rash, abnormal pigmentation or lesions.  EYES: glasses; Eyes grossly normal to inspection.  No discharge or erythema, or obvious scleral/conjunctival abnormalities.  NECK: visible goiter is not present; no visible neck masses  RESP: No audible wheeze, cough, or increased work of breathing.    NEURO: Awake, alert, responds appropriately to questions.  Mentation and speech fluent.  PSYCH:affect normal and appearance well-groomed.      DATA REVIEW     Latest Ref Rng 4/4/2023  11:30 AM 5/22/2023  1:11 PM 7/17/2023  2:49 PM 8/24/2023  9:49 AM   ENDO THYROID LABS-[facility]        TSH 0.30 - 4.20 uIU/mL 0.77  0.69  0.13 (L)  1.74    FREE T4 0.90 - 1.70 ng/dL   1.65  2.01 (H)       Legend:  (L) Low  (H) High    Narrative & Impression   EXAM: US HEAD NECK SOFT TISSUE  LOCATION: [facility]  DATE: 9/14/2023     INDICATION:  Malignant neoplasm of thyroid gland (H)  COMPARISON: None.  TECHNIQUE: Routine.     FINDINGS:   Left thyroidectomy.     Normal appearance of the right thyroid gland measuring 3.9 x 1.4 x 1.7 cm. No thyroid nodule or worrisome cervical lymphadenopathy.            Virtual Visit Details    Type of service:  Video Visit       Again, thank you for allowing me to participate in the care of your patient.      Sincerely,    Samantha Rangel MD    "

## 2023-09-19 NOTE — NURSING NOTE
Is the patient currently in the state of [place]? YES    Visit mode:VIDEO    If the visit is dropped, the patient can be reconnected by: VIDEO VISIT: Text to cell phone:   Telephone Information:   Mobile [phone]       Will anyone else be joining the visit? NO  (If patient encounters technical issues they should call [phone]    How would you like to obtain your AVS? MyChart    Are changes needed to the allergy or medication list? No, Pt stated no changes to allergies, and Pt stated no med changes    Reason for visit: Consult    Thuy FLORENTINO

## 2023-09-19 NOTE — PATIENT INSTRUCTIONS
Send me copy of the thyroglobulin result - you can send as a mychart attachment.     See me in a year with thyroid and neck ultrasound prior to the appt  The study should be done at the [facility] on [address] [facility] [address]  ([phone] to schedule)    Get labs about 2-3 weeks prior to the next appt in one year     Information for patients on Levo-thyroxine      The thyroid hormone, Levo-thyroxine (L-T4) is one of the main hormones normally produced by the thyroid.  The drug is identical in chemical structure to the natural product.  Levothyroxine is used to treat hypothyroidism (underactive thyroid).  Sometimes it is used even when the thyroid is not underactive, to treat a goiter (enlarged thyroid) or a thyroid nodule.  For patients with a history of thyroid removal, L-T4 is used to replace the deficiency created by the surgery.    The purpose of giving L-T4 to treat hypothyroidism is to make up for the thyroid hormone previously produced by your thyroid before it failed.  Depending on the extent of your thyroid failure, you may require a higher or a lower dose of L-T4.  The goal is to make your blood level of TSH (a hormone made by the master gland, the pituitary, the gland which controls and judges the thyroid) normal.    This drug is usually well-tolerated and safe but it is important to take the proper dose for YOU.  This involves periodic measurements of TSH, usually within 1-2 months of a dose change.  You should be off biotin containing supplements for at least one week prior to thyroid blood tests.    You should alert your doctor if you have signs you are getting too much L-T4.  These include excessive nervousness, heart fluttering or skipping beats, diarrhea, or feeling too hot and/or sweaty.      There are many brand names for Levo-thyroxine (L-T4), including Synthroid, Levoxyl, Levothroid, Unithroid, Tirosint and others.  Changing from one brand name thyroid hormone product to another or to a generic product (all  generics are called levothyroxine) can cause changes in your thyroid hormone balance, even if the dose stays the same.  Since generic products can come from many different companies (such as [email or web address], [employer], [employer] and others), there may be less consistency among the different generic preparations.  The decision to change brands or to change to a generic product must be made with your physician or other caregiver.  Follow-up testing should be arranged to monitor for any needed adjustments.  Monitoring may need to be more frequent if you always receive a generic thyroid hormone product.    For proper thyroid hormone balance the dose of thyroid hormone in your pills must be precise and consistent.    Be sure to take the medication as prescribed on an empty stomach, and at least 4 hours apart from calcium supplements, iron and soy products.  Store the medication away from severe heat and humidity.    Using a pill tray can help you keep track of your medication.  Specifically, if you get a pill tray that has all days of the week (Sunday-Saturday) and also 4 boxes for each day (often labeled as breafkast, lunch, dinner and bedtime) you can use the box to fill your once/day  pills for a whole month.  If you miss a levothyroxine or vitamin D dose you can take 2 the next day.    You should be OFF any biotin containing supplements at least 7 days prior to thyroid lab draws.       Many insurance companies and other providers charge higher co-payments for brand name medications.  Since brand name L-T4 is not very expensive, be sure to ask if the actual cost of buying the medication is less than the co-payment.  In some instances the charge for a co-payment may be greater than buying the drug outright, especially if the prescription needs to be refilled every 30 days.

## 2023-09-19 NOTE — PROGRESS NOTES
Endocrine Consult Video visit note-    Attending Assessment/Plan :     Papillary thyroid carcinoma 0.4 cm, left. S/p left lobectomy and isthmusetomy 8/2022.    Agree she has likely had all the treatment she will ever need.   Agree this doesn't typically require levothyroxine treatment.  She is already on LT4   Labs to include Tg for baseline  See me in one year with neck and thyroid US prior     History of partial thyroidectomy.  She is on lT4 100 mcg/day.  TFTs were normal prior to treatment but she says she feels better on treatment.   OK to target normal TSH   It is unclear why TFTS are being measured so frequently    Addendum  10/4/23 Tg 1.2, BARRY < 0.4, TSH 0.13,   4/1/24 TSH 0.95  7/22/24 Tg 2.4, Barry < 0.4, TSH 0.95, free T4 1.57  8/7/24 mychart notice she is changing providers.  Pending orders cancelled by me in response to this notice.     On corticosteroid prednisone 5 mg/day. This is being given by her PCP.  I will stay out of it.     Due to the COVID 19 pandemic this visit was a video visit in order to help prevent spread of infection in this patient and the general population. The patient gave verbal consent for the visit today. I have independently reviewed and interpreted labs, imaging as indicated.     Distant Location (provider location):  Off-site  Mode of Communication:  Video Conference via [employer]  Chart review/prep time 1  [id number]  Visit Start time  1745   Visit Stop time 1826   _90_ minutes spent on the date of the encounter doing chart review, history and exam, documentation and further activities as noted above.    Samantha Rangel MD    Chief complaint:  Anahy is a 43 year old female seen in consultation at the request of Zainab BAEZ CNP for history of partial thyroidectomy  I have reviewed Care Everywhere including [facility], [facility], [facility], [place],  lab reports, imaging reports and provider notes as indicated.      HISTORY OF PRESENT ILLNESS    Anahy has been  receiving endocrine care with Dr Mack Dai at [facility]. She last saw him 12/2/22.   She says she is not planning to continue to follow at [facility].      She recalls that she was having tests for her symptoms which included weight gain, night sweats and other symptoms. Specifically she recalls that she Gained 60 # in < 6 weeks, eating < 1000 kcal/day, got up to 190#    The first thyroid US was performed due to tenderness noted on exam of the neck.    8/16/22 FNAB cytology suspicious suggestive or PTC  8/26/22 left thyroid lobectomy and isthmusectomy ([facility], Dr Keith Adler)  Papillary thyroid  carcinoma 0.4 cm.  Postoperatively she had multiple ED visits and CT scans of the neck.  Dr Adler;s 8/30/22 indicated he believed she had a postoperative seroma.    LT4 was started by her PCP though she did not have postsurgical hypothyroidism.  She is currently on LT4 100 mcg/day. She recalls that she was able to lose weight and the night sweats resolved when she started thyroid hormone. Other symptoms also improved    The MAR indicates the following    prednisone started 2/28/2023.  She also recalls kenalog at some point , they are treating the ESR and CRP   [place] thyorid started 2/7/23  LT4 started 4/6/23     Endocrine relevant labs are as follows:  5/16/16 AMH 2.15, testosterone 5  5/30/17 AMH 2.62  5/25/22 TSH 0.99  6/1/22 covid 19 molecular negative  7/6/22 [facility] aldosterone 6.8, renin activity 0.287m free T4 0.84, T3 90, metanephrine 18.8 pg/ml, normetanephrine 60.5 pg/ml  8/16/22 1102 cortisol 6 TSH 1.2, free T4 1.1, HgbA1c 5.3%, 25OHD 55.8, B12 1093  8/25/22 TPO 0.5 (< 9 international unit(s)/ml)  10/6/22 TSH 1.78  12/2/22 TSH 1.4 , free T4 1.5  12/12/22 1025 cortisol 4.4 mcg/dL, TSH 2.1, 25OHD 50, DHEAS 55 mcg/dl ferritin 36, ESR 37, SPEP no monoclonal, TTG IgA < 1.2  2/2/23 [facility] 1337 cortisol 8.4 mcg/dl , TSH 0.706 , T3 100, CRP 1.44 (< 0.5 mg/dl), Na 141, K 3.4  4/4/23 TSH 0.77  5/22/23 TSH 0.69  7/17/23  TSH 0.13, free T4 1.65  8/24/23 TSH 1.74, free T4 2.01  8/25/23 [facility]  137, K 2.6, Cl  97, CO2 26, creatinine 1.17, glucose 104    Relevant imaging is as follows: (as read by me as it pertains to endocrine relevant organs)  5/18/22 CTA chest subtle heterogeneity noted thyroid isthmus and left lobe   7/14/22 CT head  8/4/22 thyroid US:([facility]):    left lobe nodule 1.2  x 1.4 x 1.8 hypoechoic  Left isthmus 0.7 X 0.4 x 0.6   8/25/22 thyroid US ([facility]):   Left isthmus nodule 0.7 x 0.4 x 0.8 cm  Left lobe nodule  1.3 x 1.3 x 2  Left upper nodule  0.4 x 0.5 x 0.3 cm   8/26/22 surgery left thyroid   8/27/22 CT neck soft tissue with contrast:   Right thyroid normal  Left thyroid bed and isthmus  ? Edema    8/30/22 CT neck with contrast ([facility]) right thyroid normal.  Left thyroid bed/isthmus region has hypodense enlargement   [facility] only viewable on [facility]    REVIEW OF SYSTEMS  Feeling OK , not back to precovid state  Getting older   Long time to recharge  Sleep at night mostly OK  No sleep apnea   Night sweats pretty much gone now  Weight is currently 163 (peak had been 210 last summer)  A little swelling which she treats with lasix 60; BP increases if she doesn't take lasix   No periods since the uterine ablation in July 2023--she also had one ovary removed; prior to that she had flow x 40 days   Osteoarthritis in CMS joints and in back ; had knee injections x years for this;   10 system ROS otherwise as per the HPI or negative    Past Medical History  Past Medical History:   Diagnosis Date    Abnormal uterine bleeding 2020    Acne vulgaris     Anemia 1988    Anxiety     Arthritis 1/15/2022    RA is negative    Asthma     Concussion 05/2007    Eczema 1993    Endometriosis     GERD (gastroesophageal reflux disease)     H/O partial thyroidectomy     HTN (hypertension)     Hypertension 5/15/2022    Infection due to 2019 novel coronavirus 01/04/2022    Long COVID     Major depression, single  episode 02/07/2023    papillary thyroid carcinoma 08/16/2022    left 0.4 cm    PCOS (polycystic ovarian syndrome)     Pink eye disease of both eyes 10/24/2018    Rosacea     Thrombocytosis      8/2021 dog bite -     Medications  Current Outpatient Medications   Medication Sig Dispense Refill    albuterol (PROAIR HFA;PROVENTIL HFA;VENTOLIN HFA) 90 mcg/actuation inhaler Inhale 2 puffs into the lungs every 6 hours as needed       amantadine (SYMMETREL) 100 MG capsule Take 1 capsule (100 mg) by mouth 2 times daily 60 capsule 3    clobetasol propionate (CLOBEX) 0.05 % external shampoo 02/22/2022 Clobetasol Shampoo 0.05 %      02/22/2022  active      eletriptan (RELPAX) 20 MG tablet Take 1-2 tablets (20-40 mg) by mouth at onset of headache for migraine May repeat in 2 hours. Max 4 tablets/24 hours. 30 tablet 3    furosemide (LASIX) 20 MG tablet Take 2 tablets in the AM and one tablet in the  tablet 3    ENEIDA 1.5/30 1.5-30 MG-MCG tablet Take 1 tablet by mouth daily      levothyroxine (SYNTHROID/LEVOTHROID) 100 MCG tablet Take 1 tablet (100 mcg) by mouth daily 90 tablet 1    loratadine-pseudoePHEDrine (CLARITIN-D 24 HOUR)  MG 24 hr tablet Take 1 tablet by mouth daily 30 tablet 11    LORazepam (ATIVAN) 1 MG tablet Take 1 tablet (1 mg) by mouth 3 times daily as needed for anxiety or sleep 90 tablet 5    Loteprednol Etabonate (LOTEMAX SM) 0.38 % GEL Apply 1 drop to eye      modafinil (PROVIGIL) 100 MG tablet Take one tablet by mouth once a day, may take additional tablet daily if needed for brain fog/fatigue 30 tablet 3    ondansetron (ZOFRAN ODT) 4 MG ODT tab Take 1 tablet (4 mg) by mouth every 8 hours as needed for nausea 10 tablet 0    pantoprazole (PROTONIX) 40 MG EC tablet Take 1 tablet (40 mg) by mouth 2 times daily Further refills per  tablet 3    predniSONE (DELTASONE) 5 MG tablet Take 1 tablet (5 mg) by mouth daily 30 tablet 4       Allergies  Allergies   Allergen Reactions    Ketorolac Hives      "Other reaction(s): *Unknown, Other (see comments)    Letrozole Other (See Comments), Rash and Shortness Of Breath     Asthma attack per pt    Other Reaction(s): Wheezing (Reselect Reaction)    Amoxicillin Other (See Comments)     Per patient, she took amoxicillin chronically and it is no longer effective    Ceftriaxone Itching    Hydrocodone-Acetaminophen      Other reaction(s): Unknown    Hydroxyzine Other (See Comments)     Per pt \" I turn into a zombie\"        Ketorolac Tromethamine Other (See Comments)    Meloxicam Other (See Comments)    Tizanidine Other (See Comments)    Vicodin [Hydrocodone-Acetaminophen] Hives       Family History  family history includes Anxiety Disorder in her brother, daughter, daughter, and another family member; Asthma in her daughter, daughter, mother, nephew, and niece; Breast Cancer in some other family members; Depression in her daughter and other family members; Diabetes in her maternal grandfather and another family member; Genetic Disorder in her brother and mother; Hyperlipidemia in her father; Hypertension in her mother; Mental Illness in her brother and paternal grandfather; Other Cancer in an other family member; Prostate Cancer in an other family member; Thyroid Disease in an other family member.    Social History  Social History     Tobacco Use    Smoking status: Never    Smokeless tobacco: Never   Vaping Use    Vaping Use: Never used   Substance Use Topics    Alcohol use: Yes     Comment: Socially    Drug use: Never     PCP Daylin Griffith  Was  and she sprained her ankles and wrist quite a bit.   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  BP Readings from Last 1 Encounters:   07/20/23 120/77      Pulse Readings from Last 1 Encounters:   07/20/23 76      Resp Readings from Last 1 Encounters:   07/20/23 14      Temp Readings from Last 1 Encounters:   07/20/23 97.4  F (36.3  C) (Temporal)      SpO2 " "Readings from Last 1 Encounters:   07/20/23 100%      Wt Readings from Last 1 Encounters:   09/19/23 69.4 kg (153 lb)      Ht Readings from Last 1 Encounters:   09/19/23 1.651 m (5' 5\")     GENERAL: no distress; I can see from mid chest up  SKIN: Visible skin clear. No significant rash, abnormal pigmentation or lesions.  EYES: glasses; Eyes grossly normal to inspection.  No discharge or erythema, or obvious scleral/conjunctival abnormalities.  NECK: visible goiter is not present; no visible neck masses  RESP: No audible wheeze, cough, or increased work of breathing.    NEURO: Awake, alert, responds appropriately to questions.  Mentation and speech fluent.  PSYCH:affect normal and appearance well-groomed.      DATA REVIEW     Latest Ref Rng 4/4/2023  11:30 AM 5/22/2023  1:11 PM 7/17/2023  2:49 PM 8/24/2023  9:49 AM   ENDO THYROID LABS-[facility]        TSH 0.30 - 4.20 uIU/mL 0.77  0.69  0.13 (L)  1.74    FREE T4 0.90 - 1.70 ng/dL   1.65  2.01 (H)       Legend:  (L) Low  (H) High    Narrative & Impression   EXAM: US HEAD NECK SOFT TISSUE  LOCATION: [facility]  DATE: 9/14/2023     INDICATION:  Malignant neoplasm of thyroid gland (H)  COMPARISON: None.  TECHNIQUE: Routine.     FINDINGS:   Left thyroidectomy.     Normal appearance of the right thyroid gland measuring 3.9 x 1.4 x 1.7 cm. No thyroid nodule or worrisome cervical lymphadenopathy.          "

## 2023-09-20 PROBLEM — Z79.52 ON CORTICOSTEROID THERAPY: Status: ACTIVE | Noted: 2023-09-20

## 2023-09-22 NOTE — PROGRESS NOTES
DISCHARGE  Reason for Discharge: Patient has failed to schedule further appointments.    Equipment Issued: none    Discharge Plan: N/A    Referring Provider:  Maryann Cao MD       04/25/23 1100   Appointment Info   Treating Provider Cat Thompson OTR/L   Visits Used 0   Medical Diagnosis Long Covid   Progress Note/Certification   Start Of Care Date 04/25/23   Onset of Illness/Injury or Date of Surgery 03/01/23  (date of referral to OT)   Therapy Frequency once a week   Progress Note Due Date 06/24/23   Recertification Due 07/24/23   OT Goal 1   Goal Description Patient will demonstrate the ability to complete 2-3 tasks simultaneously with distractions, with 90% accuracy and little to no difficulty, for increased concentration and attention skills to improve her ability to participate in home tasks   Target Date 06/24/23   OT Goal 2   Goal Description Patient will verbalize understanding of memory compensation strategies and activities to increase cognitive function for improved memory and cognitive skills for increased ADL/IADL independence.   Target Date 06/24/23   OT Goal 3   Goal Description Pt will demonstrate visual skills, reaction time, Anthony coordination, attention, and cognition WNL for increased ease of functional and community mobility tasks (navigating new environments, multi-tasking during IADL's) by scoring WNLs on 5/5 IADL tasks (e.g. Dynavision, Scan course, SDMT, trail-making, WCPA).   Target Date 06/24/23   Eval/Assessments   OT Eval, Low Complexity Minutes (58672) 35   Education   Learner/Method Patient   Readiness Acceptance   Plan   Plan for next session Dynavision with dual tasking, divided attention(podcast while performing math worksheet etc), deductive reasoning puzzles, scan course with dual task, WCPA, SDMT, Trail Making. consider SSP. Instruct on strategies for memory and attention to supplement exercises   Comments   Comments Patient declines OT for instruction on sleep  hygiene or fatigue management as she has addressed this in the past with other therapists and does not feel she needs to review/repeat it.

## 2023-09-22 NOTE — ADDENDUM NOTE
Encounter addended by: Carol Thompson, OT on: 9/22/2023 8:40 AM   Actions taken: Episode resolved, Clinical Note Signed, Flowsheet accepted

## 2023-10-04 ENCOUNTER — LAB (OUTPATIENT)
Dept: LAB | Facility: CLINIC | Age: 43
End: 2023-10-04
Payer: COMMERCIAL

## 2023-10-04 DIAGNOSIS — N17.9 AKI (ACUTE KIDNEY INJURY) (H): ICD-10-CM

## 2023-10-04 DIAGNOSIS — C73 PAPILLARY THYROID CARCINOMA (H): ICD-10-CM

## 2023-10-04 DIAGNOSIS — Z13.220 LIPID SCREENING: ICD-10-CM

## 2023-10-04 DIAGNOSIS — E87.6 HYPOKALEMIA: ICD-10-CM

## 2023-10-04 DIAGNOSIS — N93.9 VAGINAL BLEEDING: ICD-10-CM

## 2023-10-04 DIAGNOSIS — E89.0 HISTORY OF PARTIAL THYROIDECTOMY: ICD-10-CM

## 2023-10-04 DIAGNOSIS — I10 PRIMARY HYPERTENSION: ICD-10-CM

## 2023-10-04 DIAGNOSIS — M79.10 MYALGIA: ICD-10-CM

## 2023-10-04 DIAGNOSIS — D75.839 THROMBOCYTOSIS, UNSPECIFIED: ICD-10-CM

## 2023-10-04 LAB
ERYTHROCYTE [DISTWIDTH] IN BLOOD BY AUTOMATED COUNT: 12.8 % (ref 10–15)
ERYTHROCYTE [SEDIMENTATION RATE] IN BLOOD BY WESTERGREN METHOD: 14 MM/HR (ref 0–20)
HCT VFR BLD AUTO: 38.5 % (ref 35–47)
HGB BLD-MCNC: 13.3 G/DL (ref 11.7–15.7)
MCH RBC QN AUTO: 29.2 PG (ref 26.5–33)
MCHC RBC AUTO-ENTMCNC: 34.5 G/DL (ref 31.5–36.5)
MCV RBC AUTO: 84 FL (ref 78–100)
PLATELET # BLD AUTO: 459 10E3/UL (ref 150–450)
RBC # BLD AUTO: 4.56 10E6/UL (ref 3.8–5.2)
WBC # BLD AUTO: 9.2 10E3/UL (ref 4–11)

## 2023-10-04 PROCEDURE — 80061 LIPID PANEL: CPT | Performed by: PHYSICAL MEDICINE & REHABILITATION

## 2023-10-04 PROCEDURE — 86140 C-REACTIVE PROTEIN: CPT | Performed by: PHYSICAL MEDICINE & REHABILITATION

## 2023-10-04 PROCEDURE — 99000 SPECIMEN HANDLING OFFICE-LAB: CPT | Performed by: PATHOLOGY

## 2023-10-04 PROCEDURE — 86800 THYROGLOBULIN ANTIBODY: CPT | Mod: 90

## 2023-10-04 PROCEDURE — 85027 COMPLETE CBC AUTOMATED: CPT

## 2023-10-04 PROCEDURE — 84432 ASSAY OF THYROGLOBULIN: CPT | Mod: 90

## 2023-10-04 PROCEDURE — 84443 ASSAY THYROID STIM HORMONE: CPT | Performed by: PHYSICAL MEDICINE & REHABILITATION

## 2023-10-04 PROCEDURE — 36415 COLL VENOUS BLD VENIPUNCTURE: CPT | Mod: VID | Performed by: PHYSICAL MEDICINE & REHABILITATION

## 2023-10-04 PROCEDURE — 80053 COMPREHEN METABOLIC PANEL: CPT | Performed by: PHYSICAL MEDICINE & REHABILITATION

## 2023-10-04 PROCEDURE — 85652 RBC SED RATE AUTOMATED: CPT | Mod: VID | Performed by: PHYSICAL MEDICINE & REHABILITATION

## 2023-10-05 ENCOUNTER — MYC MEDICAL ADVICE (OUTPATIENT)
Dept: FAMILY MEDICINE | Facility: CLINIC | Age: 43
End: 2023-10-05
Payer: COMMERCIAL

## 2023-10-05 DIAGNOSIS — E87.6 HYPOKALEMIA: Primary | ICD-10-CM

## 2023-10-05 LAB
ALBUMIN SERPL BCG-MCNC: 4.6 G/DL (ref 3.5–5.2)
ALP SERPL-CCNC: 134 U/L (ref 35–104)
ALT SERPL W P-5'-P-CCNC: 12 U/L (ref 0–50)
ANION GAP SERPL CALCULATED.3IONS-SCNC: 15 MMOL/L (ref 7–15)
AST SERPL W P-5'-P-CCNC: 20 U/L (ref 0–45)
BILIRUB SERPL-MCNC: 0.2 MG/DL
BUN SERPL-MCNC: 13.3 MG/DL (ref 6–20)
CALCIUM SERPL-MCNC: 9.4 MG/DL (ref 8.6–10)
CHLORIDE SERPL-SCNC: 100 MMOL/L (ref 98–107)
CHOLEST SERPL-MCNC: 209 MG/DL
CREAT SERPL-MCNC: 0.86 MG/DL (ref 0.51–0.95)
CRP SERPL-MCNC: 8.07 MG/L
DEPRECATED HCO3 PLAS-SCNC: 24 MMOL/L (ref 22–29)
EGFRCR SERPLBLD CKD-EPI 2021: 85 ML/MIN/1.73M2
GLUCOSE SERPL-MCNC: 114 MG/DL (ref 70–99)
HDLC SERPL-MCNC: 75 MG/DL
LDLC SERPL CALC-MCNC: 112 MG/DL
NONHDLC SERPL-MCNC: 134 MG/DL
POTASSIUM SERPL-SCNC: 3 MMOL/L (ref 3.4–5.3)
PROT SERPL-MCNC: 7.7 G/DL (ref 6.4–8.3)
SODIUM SERPL-SCNC: 139 MMOL/L (ref 135–145)
TRIGL SERPL-MCNC: 112 MG/DL
TSH SERPL DL<=0.005 MIU/L-ACNC: 0.13 UIU/ML (ref 0.3–4.2)

## 2023-10-07 ENCOUNTER — OFFICE VISIT (OUTPATIENT)
Dept: FAMILY MEDICINE | Facility: CLINIC | Age: 43
End: 2023-10-07
Payer: COMMERCIAL

## 2023-10-07 VITALS
BODY MASS INDEX: 28.96 KG/M2 | HEART RATE: 85 BPM | OXYGEN SATURATION: 98 % | WEIGHT: 174 LBS | TEMPERATURE: 98.4 F | SYSTOLIC BLOOD PRESSURE: 137 MMHG | DIASTOLIC BLOOD PRESSURE: 95 MMHG | RESPIRATION RATE: 16 BRPM

## 2023-10-07 DIAGNOSIS — R42 DIZZINESS: Primary | ICD-10-CM

## 2023-10-07 LAB
ALBUMIN UR-MCNC: NEGATIVE MG/DL
ANION GAP SERPL CALCULATED.3IONS-SCNC: 14 MMOL/L (ref 7–15)
APPEARANCE UR: CLEAR
BACTERIA #/AREA URNS HPF: ABNORMAL /HPF
BASO+EOS+MONOS # BLD AUTO: NORMAL 10*3/UL
BASO+EOS+MONOS NFR BLD AUTO: NORMAL %
BASOPHILS # BLD AUTO: 0.1 10E3/UL (ref 0–0.2)
BASOPHILS NFR BLD AUTO: 1 %
BILIRUB UR QL STRIP: NEGATIVE
BUN SERPL-MCNC: 8.6 MG/DL (ref 6–20)
CALCIUM SERPL-MCNC: 9 MG/DL (ref 8.6–10)
CHLORIDE SERPL-SCNC: 104 MMOL/L (ref 98–107)
COLOR UR AUTO: YELLOW
CREAT SERPL-MCNC: 0.74 MG/DL (ref 0.51–0.95)
DEPRECATED HCO3 PLAS-SCNC: 22 MMOL/L (ref 22–29)
EGFRCR SERPLBLD CKD-EPI 2021: >90 ML/MIN/1.73M2
EOSINOPHIL # BLD AUTO: 0 10E3/UL (ref 0–0.7)
EOSINOPHIL NFR BLD AUTO: 0 %
ERYTHROCYTE [DISTWIDTH] IN BLOOD BY AUTOMATED COUNT: 13 % (ref 10–15)
GLUCOSE SERPL-MCNC: 96 MG/DL (ref 70–99)
GLUCOSE UR STRIP-MCNC: NEGATIVE MG/DL
HCT VFR BLD AUTO: 38.5 % (ref 35–47)
HGB BLD-MCNC: 13.1 G/DL (ref 11.7–15.7)
HGB UR QL STRIP: ABNORMAL
IMM GRANULOCYTES # BLD: 0 10E3/UL
IMM GRANULOCYTES NFR BLD: 0 %
KETONES UR STRIP-MCNC: NEGATIVE MG/DL
LEUKOCYTE ESTERASE UR QL STRIP: NEGATIVE
LYMPHOCYTES # BLD AUTO: 1.7 10E3/UL (ref 0.8–5.3)
LYMPHOCYTES NFR BLD AUTO: 17 %
MCH RBC QN AUTO: 29.7 PG (ref 26.5–33)
MCHC RBC AUTO-ENTMCNC: 34 G/DL (ref 31.5–36.5)
MCV RBC AUTO: 87 FL (ref 78–100)
MONOCYTES # BLD AUTO: 0.4 10E3/UL (ref 0–1.3)
MONOCYTES NFR BLD AUTO: 4 %
NEUTROPHILS # BLD AUTO: 8.2 10E3/UL (ref 1.6–8.3)
NEUTROPHILS NFR BLD AUTO: 79 %
NITRATE UR QL: NEGATIVE
PH UR STRIP: 7.5 [PH] (ref 5–8)
PLATELET # BLD AUTO: 414 10E3/UL (ref 150–450)
POTASSIUM SERPL-SCNC: 3.3 MMOL/L (ref 3.4–5.3)
RBC # BLD AUTO: 4.41 10E6/UL (ref 3.8–5.2)
RBC #/AREA URNS AUTO: ABNORMAL /HPF
SODIUM SERPL-SCNC: 140 MMOL/L (ref 135–145)
SP GR UR STRIP: 1.02 (ref 1–1.03)
SQUAMOUS #/AREA URNS AUTO: ABNORMAL /LPF
UROBILINOGEN UR STRIP-ACNC: 0.2 E.U./DL
WBC # BLD AUTO: 10.4 10E3/UL (ref 4–11)
WBC #/AREA URNS AUTO: ABNORMAL /HPF

## 2023-10-07 PROCEDURE — 85025 COMPLETE CBC W/AUTO DIFF WBC: CPT | Performed by: PHYSICIAN ASSISTANT

## 2023-10-07 PROCEDURE — 99214 OFFICE O/P EST MOD 30 MIN: CPT | Mod: 25 | Performed by: PHYSICIAN ASSISTANT

## 2023-10-07 PROCEDURE — 36415 COLL VENOUS BLD VENIPUNCTURE: CPT | Performed by: PHYSICIAN ASSISTANT

## 2023-10-07 PROCEDURE — 80048 BASIC METABOLIC PNL TOTAL CA: CPT | Performed by: PHYSICIAN ASSISTANT

## 2023-10-07 PROCEDURE — 93005 ELECTROCARDIOGRAM TRACING: CPT | Performed by: PHYSICIAN ASSISTANT

## 2023-10-07 PROCEDURE — 81001 URINALYSIS AUTO W/SCOPE: CPT | Performed by: PHYSICIAN ASSISTANT

## 2023-10-07 PROCEDURE — 93010 ELECTROCARDIOGRAM REPORT: CPT | Performed by: INTERNAL MEDICINE

## 2023-10-07 NOTE — PROGRESS NOTES
Assessment & Plan:      Problem List Items Addressed This Visit    None  Visit Diagnoses       Dizziness    -  Primary    Relevant Orders    Basic metabolic panel  (Ca, Cl, CO2, Creat, Gluc, K, Na, BUN) (Completed)    CBC with platelets and differential (Completed)    UA Macroscopic with reflex to Microscopic and Culture - Clinic Collect (Completed)    EKG 12-lead, tracing only (Completed)    UA Microscopic with Reflex to Culture (Completed)          Medical Decision Making  Patient with history of low potassium presents with dizziness, fatigue, occasional palpitations, and left middle back pains.  Urine analysis shows persisting hematuria which patient has chronically.  No signs of UTI.  CBC shows no signs of anemia and white blood cell count is normal to rule out signs of worse infection.  EKG shows normal sinus rhythm.  BMP shows improvement of potassium from 3.0-3.3 after patient initiated potassium tablets.  Recommend she continue on this and follow-up as needed.  Discussed signs of worsening symptoms when to be seen immediately for reevaluation if needed.     Subjective:      Anahy Urena is a 43 year old female here for evaluation of dizziness, fatigue, occasional palpitations, and left middle back pains.  Onset of symptoms was over the last few days.  Patient does note she is under a lot of stress.  Patient has history of hypokalemia and is worried that her potassium is decreasing.  Patient had a potassium of 3.03 days ago and was started on potassium tablets.  Patient is drinking several bottles of propel a day and getting IV fluids services at home.     The following portions of the patient's history were reviewed and updated as appropriate: allergies, current medications, and problem list.     Review of Systems  Pertinent items are noted in HPI.    Allergies  Allergies   Allergen Reactions    Ketorolac Hives     Other reaction(s): *Unknown, Other (see comments)    Letrozole Other (See Comments), Rash  "and Shortness Of Breath     Asthma attack per pt    Other Reaction(s): Wheezing (Reselect Reaction)    Amoxicillin Other (See Comments)     Per patient, she took amoxicillin chronically and it is no longer effective    Ceftriaxone Itching    Hydrocodone-Acetaminophen      Other reaction(s): Unknown    Hydroxyzine Other (See Comments)     Per pt \" I turn into a zombie\"        Ketorolac Tromethamine Other (See Comments)    Meloxicam Other (See Comments)    Tizanidine Other (See Comments)    Vicodin [Hydrocodone-Acetaminophen] Hives       Family History   Problem Relation Age of Onset    Hypertension Mother     Asthma Mother     Genetic Disorder Mother         Bone deformation; hand bone deformity; \"Elizabeth hands\";    Hyperlipidemia Father     Anxiety Disorder Brother     Mental Illness Brother         Torettes Syndrome, OCD, ODDS    Genetic Disorder Brother         Bone deformation;hand deformity he was born with ; brachydactyly    Diabetes Maternal Grandfather     Mental Illness Paternal Grandfather         OCD    Depression Daughter     Anxiety Disorder Daughter     Asthma Daughter     Anxiety Disorder Daughter     Asthma Daughter     Breast Cancer Maternal Aunt     Other Cancer Maternal Aunt         She had ovarian, cervical, brain, lymphoma    Thyroid Cancer Maternal Aunt         type unknown    Leukemia Maternal Aunt         chidlhood    Brain Tumor Maternal Aunt     Depression Maternal Aunt     Hyperthyroidism Maternal Aunt         Also her daughter has thyroid issues    Gestational Diabetes Maternal Aunt     Diabetes Maternal Uncle     Depression Paternal Aunt     Breast Cancer Paternal Aunt     Anxiety Disorder Paternal Aunt     Prostate Cancer Paternal Uncle     Asthma Niece     Asthma Nephew     Thyroid Disease Cousin        Social History     Tobacco Use    Smoking status: Never    Smokeless tobacco: Never   Substance Use Topics    Alcohol use: Yes     Comment: Socially        Objective:      BP (!) 137/95   " Pulse 85   Temp 98.4  F (36.9  C)   Resp 16   Wt 78.9 kg (174 lb)   SpO2 98%   BMI 28.96 kg/m    General appearance - alert, well appearing, and in no distress and non-toxic  Chest - clear to auscultation, no wheezes, rales or rhonchi, symmetric air entry  Heart - normal rate, regular rhythm, normal S1, S2, no murmurs, rubs, clicks or gallops     Lab & Imaging Results    Results for orders placed or performed in visit on 10/07/23   UA Macroscopic with reflex to Microscopic and Culture - Clinic Collect     Status: Abnormal    Specimen: Urine, Clean Catch   Result Value Ref Range    Color Urine Yellow Colorless, Straw, Light Yellow, Yellow    Appearance Urine Clear Clear    Glucose Urine Negative Negative mg/dL    Bilirubin Urine Negative Negative    Ketones Urine Negative Negative mg/dL    Specific Gravity Urine 1.020 1.005 - 1.030    Blood Urine Moderate (A) Negative    pH Urine 7.5 5.0 - 8.0    Protein Albumin Urine Negative Negative mg/dL    Urobilinogen Urine 0.2 0.2, 1.0 E.U./dL    Nitrite Urine Negative Negative    Leukocyte Esterase Urine Negative Negative   Basic metabolic panel  (Ca, Cl, CO2, Creat, Gluc, K, Na, BUN)     Status: Abnormal   Result Value Ref Range    Sodium 140 135 - 145 mmol/L    Potassium 3.3 (L) 3.4 - 5.3 mmol/L    Chloride 104 98 - 107 mmol/L    Carbon Dioxide (CO2) 22 22 - 29 mmol/L    Anion Gap 14 7 - 15 mmol/L    Urea Nitrogen 8.6 6.0 - 20.0 mg/dL    Creatinine 0.74 0.51 - 0.95 mg/dL    GFR Estimate >90 >60 mL/min/1.73m2    Calcium 9.0 8.6 - 10.0 mg/dL    Glucose 96 70 - 99 mg/dL   CBC with platelets and differential     Status: None   Result Value Ref Range    WBC Count 10.4 4.0 - 11.0 10e3/uL    RBC Count 4.41 3.80 - 5.20 10e6/uL    Hemoglobin 13.1 11.7 - 15.7 g/dL    Hematocrit 38.5 35.0 - 47.0 %    MCV 87 78 - 100 fL    MCH 29.7 26.5 - 33.0 pg    MCHC 34.0 31.5 - 36.5 g/dL    RDW 13.0 10.0 - 15.0 %    Platelet Count 414 150 - 450 10e3/uL    % Neutrophils 79 %    % Lymphocytes  17 %    % Monocytes 4 %    Mids % (Monos, Eos, Basos)      % Eosinophils 0 %    % Basophils 1 %    % Immature Granulocytes 0 %    Absolute Neutrophils 8.2 1.6 - 8.3 10e3/uL    Absolute Lymphocytes 1.7 0.8 - 5.3 10e3/uL    Absolute Monocytes 0.4 0.0 - 1.3 10e3/uL    Mids Abs (Monos, Eos, Basos)      Absolute Eosinophils 0.0 0.0 - 0.7 10e3/uL    Absolute Basophils 0.1 0.0 - 0.2 10e3/uL    Absolute Immature Granulocytes 0.0 <=0.4 10e3/uL   UA Microscopic with Reflex to Culture     Status: Abnormal   Result Value Ref Range    Bacteria Urine Few (A) None Seen /HPF    RBC Urine 10-25 (A) 0-2 /HPF /HPF    WBC Urine 0-5 0-5 /HPF /HPF    Squamous Epithelials Urine Few (A) None Seen /LPF    Narrative    Urine Culture not indicated   EKG 12-lead, tracing only     Status: None (Preliminary result)   Result Value Ref Range    Systolic Blood Pressure  mmHg    Diastolic Blood Pressure  mmHg    Ventricular Rate 81 BPM    Atrial Rate 81 BPM    WV Interval 130 ms    QRS Duration 84 ms     ms    QTc 446 ms    P Axis 38 degrees    R AXIS 54 degrees    T Axis 28 degrees    Interpretation ECG       Sinus rhythm  Normal ECG  No previous ECGs available     CBC with platelets and differential     Status: None    Narrative    The following orders were created for panel order CBC with platelets and differential.  Procedure                               Abnormality         Status                     ---------                               -----------         ------                     CBC with platelets and d...[[id number]]                      Final result                 Please view results for these tests on the individual orders.       I personally reviewed these results and discussed findings with the patient.    The use of Dragon/PowerMic dictation services was used to construct the content of this note; any grammatical errors are non-intentional. Please contact the author directly if you are in need of any clarification.

## 2023-10-10 LAB
ATRIAL RATE - MUSE: 81 BPM
DIASTOLIC BLOOD PRESSURE - MUSE: NORMAL MMHG
INTERPRETATION ECG - MUSE: NORMAL
P AXIS - MUSE: 38 DEGREES
PR INTERVAL - MUSE: 130 MS
QRS DURATION - MUSE: 84 MS
QT - MUSE: 384 MS
QTC - MUSE: 446 MS
R AXIS - MUSE: 54 DEGREES
SYSTOLIC BLOOD PRESSURE - MUSE: NORMAL MMHG
T AXIS - MUSE: 28 DEGREES
VENTRICULAR RATE- MUSE: 81 BPM

## 2023-10-10 RX ORDER — POTASSIUM CHLORIDE 1500 MG/1
20 TABLET, EXTENDED RELEASE ORAL DAILY
Qty: 90 TABLET | Refills: 3 | Status: SHIPPED | OUTPATIENT
Start: 2023-10-10 | End: 2023-12-04

## 2023-10-11 LAB — SCANNED LAB RESULT: NORMAL

## 2023-10-23 ENCOUNTER — VIRTUAL VISIT (OUTPATIENT)
Dept: FAMILY MEDICINE | Facility: CLINIC | Age: 43
End: 2023-10-23
Payer: COMMERCIAL

## 2023-10-23 ENCOUNTER — MYC MEDICAL ADVICE (OUTPATIENT)
Dept: FAMILY MEDICINE | Facility: CLINIC | Age: 43
End: 2023-10-23

## 2023-10-23 DIAGNOSIS — S06.0X9A CONCUSSION WITH LOSS OF CONSCIOUSNESS, INITIAL ENCOUNTER: ICD-10-CM

## 2023-10-23 DIAGNOSIS — J01.90 ACUTE SINUSITIS WITH SYMPTOMS GREATER THAN 10 DAYS: Primary | ICD-10-CM

## 2023-10-23 PROCEDURE — 99213 OFFICE O/P EST LOW 20 MIN: CPT | Mod: VID | Performed by: NURSE PRACTITIONER

## 2023-10-23 RX ORDER — LEVOFLOXACIN 750 MG/1
750 TABLET, FILM COATED ORAL DAILY
Qty: 5 TABLET | Refills: 0 | Status: SHIPPED | OUTPATIENT
Start: 2023-10-23 | End: 2023-12-08

## 2023-10-23 RX ORDER — AMANTADINE HYDROCHLORIDE 100 MG/1
100 CAPSULE, GELATIN COATED ORAL DAILY
Start: 2023-10-23 | End: 2023-12-17

## 2023-10-23 NOTE — PROGRESS NOTES
Anahy is a 43 year old who is being evaluated via a billable video visit.      How would you like to obtain your AVS? MyChart  If the video visit is dropped, the invitation should be resent by: Text to cell phone: [phone]  Will anyone else be joining your video visit? No          Assessment & Plan     Acute sinusitis with symptoms greater than 10 days  - new onset, treat with antibiotics  - levofloxacin (LEVAQUIN) 750 MG tablet  Dispense: 5 tablet; Refill: 0    Concussion with loss of consciousness, initial encounter  - monitor  - amantadine (SYMMETREL) 100 MG capsule    Kelsie Griffith NP  [facility]    Corwin Higginbotham is a 43 year old, presenting for the following health issues:  Sinus Problem        10/23/2023     2:56 PM   Additional Questions   Roomed by Maegan       History of Present Illness       Reason for visit:  Follow up on labs and now sinus infection added - want to talk about plan before jts worse    She eats 2-3 servings of fruits and vegetables daily.She consumes 0 sweetened beverage(s) daily.She exercises with enough effort to increase her heart rate 10 to 19 minutes per day.  She exercises with enough effort to increase her heart rate 3 or less days per week.   She is taking medications regularly.       Concern - sinus are over whelmed  Onset: 2 week  Description: green mucous, post nasal drip  Intensity: moderate  Progression of Symptoms:  worsening  Accompanying Signs & Symptoms: no fever  Previous history of similar problem: history of sinus infection  Precipitating factors:        Worsened by: na  Alleviating factors:        Improved by: na  Therapies tried and outcome:  none     She states she has gross drainage, her kids have been sick. She did have lab work done and her potassium was low and she is back on taking her potassium again. She did also get some IV fluids done through a mobile IV.       Review of Systems   Constitutional, HEENT,  "cardiovascular, pulmonary, gi and gu systems are negative, except as otherwise noted.      Objective    Vitals - Patient Reported  Systolic (Patient Reported): 130  Diastolic (Patient Reported): (!) 91  Blood pressure taken with manual cuff (will exclude from quality measure): Yes  Weight (Patient Reported): 72.6 kg (160 lb)  Height (Patient Reported): 165.1 cm (5' 5\")  BMI (Based on Pt Reported Ht/Wt): 26.63      Vitals:  No vitals were obtained today due to virtual visit.    Physical Exam   GENERAL: Healthy, alert and no distress  EYES: Eyes grossly normal to inspection.  No discharge or erythema, or obvious scleral/conjunctival abnormalities.  RESP: No audible wheeze, cough, or visible cyanosis.  No visible retractions or increased work of breathing.    SKIN: Visible skin clear. No significant rash, abnormal pigmentation or lesions.  NEURO: Cranial nerves grossly intact.  Mentation and speech appropriate for age.  PSYCH: Mentation appears normal, affect normal/bright, judgement and insight intact, normal speech and appearance well-groomed.    Video-Visit Details    Type of service:  Video Visit     Originating Location (pt. Location): Home    Distant Location (provider location):  On-site  Platform used for Video Visit: Augusto      "

## 2023-10-24 NOTE — TELEPHONE ENCOUNTER
Will fill out when I am in the office, I am aware of the form from our VV one day ago  Kelsie Griffith NP on 10/24/2023 at 6:00 PM

## 2023-11-06 ENCOUNTER — DOCUMENTATION ONLY (OUTPATIENT)
Dept: FAMILY MEDICINE | Facility: CLINIC | Age: 43
End: 2023-11-06
Payer: COMMERCIAL

## 2023-11-06 DIAGNOSIS — D75.839 THROMBOCYTOSIS: Primary | ICD-10-CM

## 2023-11-08 ENCOUNTER — LAB (OUTPATIENT)
Dept: LAB | Facility: CLINIC | Age: 43
End: 2023-11-08
Payer: COMMERCIAL

## 2023-11-08 DIAGNOSIS — D75.839 THROMBOCYTOSIS: ICD-10-CM

## 2023-11-08 DIAGNOSIS — N17.9 AKI (ACUTE KIDNEY INJURY) (H): ICD-10-CM

## 2023-11-08 DIAGNOSIS — E87.6 HYPOKALEMIA: ICD-10-CM

## 2023-11-08 LAB
ANION GAP SERPL CALCULATED.3IONS-SCNC: 11 MMOL/L (ref 7–15)
BASOPHILS # BLD AUTO: 0 10E3/UL (ref 0–0.2)
BASOPHILS NFR BLD AUTO: 1 %
BUN SERPL-MCNC: 11 MG/DL (ref 6–20)
CALCIUM SERPL-MCNC: 9.5 MG/DL (ref 8.6–10)
CHLORIDE SERPL-SCNC: 104 MMOL/L (ref 98–107)
CREAT SERPL-MCNC: 0.82 MG/DL (ref 0.51–0.95)
DEPRECATED HCO3 PLAS-SCNC: 25 MMOL/L (ref 22–29)
EGFRCR SERPLBLD CKD-EPI 2021: >90 ML/MIN/1.73M2
EOSINOPHIL # BLD AUTO: 0.1 10E3/UL (ref 0–0.7)
EOSINOPHIL NFR BLD AUTO: 1 %
ERYTHROCYTE [DISTWIDTH] IN BLOOD BY AUTOMATED COUNT: 12.3 % (ref 10–15)
GLUCOSE SERPL-MCNC: 89 MG/DL (ref 70–99)
HCT VFR BLD AUTO: 36.5 % (ref 35–47)
HGB BLD-MCNC: 12.7 G/DL (ref 11.7–15.7)
IMM GRANULOCYTES # BLD: 0 10E3/UL
IMM GRANULOCYTES NFR BLD: 0 %
LYMPHOCYTES # BLD AUTO: 2.8 10E3/UL (ref 0.8–5.3)
LYMPHOCYTES NFR BLD AUTO: 42 %
MCH RBC QN AUTO: 29.3 PG (ref 26.5–33)
MCHC RBC AUTO-ENTMCNC: 34.8 G/DL (ref 31.5–36.5)
MCV RBC AUTO: 84 FL (ref 78–100)
MONOCYTES # BLD AUTO: 0.4 10E3/UL (ref 0–1.3)
MONOCYTES NFR BLD AUTO: 6 %
NEUTROPHILS # BLD AUTO: 3.3 10E3/UL (ref 1.6–8.3)
NEUTROPHILS NFR BLD AUTO: 50 %
PLATELET # BLD AUTO: 377 10E3/UL (ref 150–450)
POTASSIUM SERPL-SCNC: 3.6 MMOL/L (ref 3.4–5.3)
RBC # BLD AUTO: 4.33 10E6/UL (ref 3.8–5.2)
SODIUM SERPL-SCNC: 140 MMOL/L (ref 135–145)
WBC # BLD AUTO: 6.6 10E3/UL (ref 4–11)

## 2023-11-08 PROCEDURE — 36415 COLL VENOUS BLD VENIPUNCTURE: CPT

## 2023-11-08 PROCEDURE — 80048 BASIC METABOLIC PNL TOTAL CA: CPT

## 2023-11-08 PROCEDURE — 85025 COMPLETE CBC W/AUTO DIFF WBC: CPT

## 2023-11-08 NOTE — TELEPHONE ENCOUNTER
Patient called in asking if that has been faxed to her, please fax with the provided number and she will return. States that it is her significant other that has to sign it not her, as he is her care taker     Radha Floyd/

## 2023-11-10 NOTE — TELEPHONE ENCOUNTER
This has been faxed to the number provided.[facility] to [facility] and sent to HOLD folder.  Iris Agrawal,

## 2023-11-17 ENCOUNTER — VIRTUAL VISIT (OUTPATIENT)
Dept: FAMILY MEDICINE | Facility: CLINIC | Age: 43
End: 2023-11-17
Payer: COMMERCIAL

## 2023-11-17 DIAGNOSIS — E87.6 HYPOKALEMIA: ICD-10-CM

## 2023-11-17 DIAGNOSIS — D75.839 THROMBOCYTOSIS: Primary | ICD-10-CM

## 2023-11-17 PROCEDURE — 99213 OFFICE O/P EST LOW 20 MIN: CPT | Mod: VID | Performed by: NURSE PRACTITIONER

## 2023-11-17 NOTE — PROGRESS NOTES
Anahy is a 43 year old who is being evaluated via a billable video visit.      How would you like to obtain your AVS? MyChart  If the video visit is dropped, the invitation should be resent by: Text to cell phone: [phone]  Will anyone else be joining your video visit? No          Assessment & Plan     Thrombocytosis  Recheck lab  - CBC with platelets    Hypokalemia  Recheck labs  - Basic metabolic panel  (Ca, Cl, CO2, Creat, Gluc, K, Na, BUN)          Kelsie Griffith NP  [facility]    Corwin Higginbotham is a 43 year old, presenting for the following health issues:  Follow Up        10/23/2023     2:56 PM   Additional Questions   Roomed by Maegan LEBLANC       Check on labs      Review of Systems   Constitutional, HEENT, cardiovascular, pulmonary, gi and gu systems are negative, except as otherwise noted.      Objective           Vitals:  No vitals were obtained today due to virtual visit.    Physical Exam   GENERAL: Healthy, alert and no distress  EYES: Eyes grossly normal to inspection.  No discharge or erythema, or obvious scleral/conjunctival abnormalities.  RESP: No audible wheeze, cough, or visible cyanosis.  No visible retractions or increased work of breathing.    SKIN: Visible skin clear. No significant rash, abnormal pigmentation or lesions.  NEURO: Cranial nerves grossly intact.  Mentation and speech appropriate for age.  PSYCH: Mentation appears normal, affect normal/bright, judgement and insight intact, normal speech and appearance well-groomed.          Video-Visit Details    Type of service:  Video Visit     Originating Location (pt. Location): Home    Distant Location (provider location):  On-site  Platform used for Video Visit: Shoopi

## 2023-11-20 DIAGNOSIS — C73 MALIGNANT NEOPLASM OF THYROID GLAND (H): ICD-10-CM

## 2023-11-20 RX ORDER — LEVOTHYROXINE SODIUM 100 UG/1
100 TABLET ORAL DAILY
Qty: 90 TABLET | Refills: 1 | Status: SHIPPED | OUTPATIENT
Start: 2023-11-20 | End: 2024-04-08

## 2023-11-21 ENCOUNTER — LAB (OUTPATIENT)
Dept: LAB | Facility: CLINIC | Age: 43
End: 2023-11-21
Payer: COMMERCIAL

## 2023-11-21 DIAGNOSIS — E87.6 HYPOKALEMIA: ICD-10-CM

## 2023-11-21 DIAGNOSIS — D75.839 THROMBOCYTOSIS: ICD-10-CM

## 2023-11-21 LAB
ANION GAP SERPL CALCULATED.3IONS-SCNC: 13 MMOL/L (ref 7–15)
BUN SERPL-MCNC: 10.3 MG/DL (ref 6–20)
CALCIUM SERPL-MCNC: 9 MG/DL (ref 8.6–10)
CHLORIDE SERPL-SCNC: 105 MMOL/L (ref 98–107)
CREAT SERPL-MCNC: 0.8 MG/DL (ref 0.51–0.95)
DEPRECATED HCO3 PLAS-SCNC: 26 MMOL/L (ref 22–29)
EGFRCR SERPLBLD CKD-EPI 2021: >90 ML/MIN/1.73M2
ERYTHROCYTE [DISTWIDTH] IN BLOOD BY AUTOMATED COUNT: 12.1 % (ref 10–15)
GLUCOSE SERPL-MCNC: 103 MG/DL (ref 70–99)
HCT VFR BLD AUTO: 37.7 % (ref 35–47)
HGB BLD-MCNC: 12.9 G/DL (ref 11.7–15.7)
MCH RBC QN AUTO: 29.1 PG (ref 26.5–33)
MCHC RBC AUTO-ENTMCNC: 34.2 G/DL (ref 31.5–36.5)
MCV RBC AUTO: 85 FL (ref 78–100)
PLATELET # BLD AUTO: 394 10E3/UL (ref 150–450)
POTASSIUM SERPL-SCNC: 3.2 MMOL/L (ref 3.4–5.3)
RBC # BLD AUTO: 4.44 10E6/UL (ref 3.8–5.2)
SODIUM SERPL-SCNC: 144 MMOL/L (ref 135–145)
WBC # BLD AUTO: 7.4 10E3/UL (ref 4–11)

## 2023-11-21 PROCEDURE — 80048 BASIC METABOLIC PNL TOTAL CA: CPT

## 2023-11-21 PROCEDURE — 85027 COMPLETE CBC AUTOMATED: CPT

## 2023-11-21 PROCEDURE — 36415 COLL VENOUS BLD VENIPUNCTURE: CPT

## 2023-11-27 ENCOUNTER — LAB (OUTPATIENT)
Dept: LAB | Facility: CLINIC | Age: 43
End: 2023-11-27
Payer: COMMERCIAL

## 2023-11-27 ENCOUNTER — MYC MEDICAL ADVICE (OUTPATIENT)
Dept: FAMILY MEDICINE | Facility: CLINIC | Age: 43
End: 2023-11-27

## 2023-11-27 DIAGNOSIS — D75.839 THROMBOCYTOSIS: ICD-10-CM

## 2023-11-27 DIAGNOSIS — E87.6 HYPOKALEMIA: ICD-10-CM

## 2023-11-27 LAB
ANION GAP SERPL CALCULATED.3IONS-SCNC: 11 MMOL/L (ref 7–15)
BUN SERPL-MCNC: 11 MG/DL (ref 6–20)
CALCIUM SERPL-MCNC: 9.3 MG/DL (ref 8.6–10)
CHLORIDE SERPL-SCNC: 104 MMOL/L (ref 98–107)
CREAT SERPL-MCNC: 0.76 MG/DL (ref 0.51–0.95)
DEPRECATED HCO3 PLAS-SCNC: 24 MMOL/L (ref 22–29)
EGFRCR SERPLBLD CKD-EPI 2021: >90 ML/MIN/1.73M2
ERYTHROCYTE [DISTWIDTH] IN BLOOD BY AUTOMATED COUNT: 12.3 % (ref 10–15)
GLUCOSE SERPL-MCNC: 87 MG/DL (ref 70–99)
HCT VFR BLD AUTO: 37.7 % (ref 35–47)
HGB BLD-MCNC: 12.7 G/DL (ref 11.7–15.7)
MCH RBC QN AUTO: 28.9 PG (ref 26.5–33)
MCHC RBC AUTO-ENTMCNC: 33.7 G/DL (ref 31.5–36.5)
MCV RBC AUTO: 86 FL (ref 78–100)
PLATELET # BLD AUTO: 399 10E3/UL (ref 150–450)
POTASSIUM SERPL-SCNC: 3.7 MMOL/L (ref 3.4–5.3)
RBC # BLD AUTO: 4.39 10E6/UL (ref 3.8–5.2)
SODIUM SERPL-SCNC: 139 MMOL/L (ref 135–145)
WBC # BLD AUTO: 8.7 10E3/UL (ref 4–11)

## 2023-11-27 PROCEDURE — 80048 BASIC METABOLIC PNL TOTAL CA: CPT

## 2023-11-27 PROCEDURE — 36415 COLL VENOUS BLD VENIPUNCTURE: CPT

## 2023-11-27 PROCEDURE — 85027 COMPLETE CBC AUTOMATED: CPT

## 2023-12-01 ASSESSMENT — ANXIETY QUESTIONNAIRES
6. BECOMING EASILY ANNOYED OR IRRITABLE: NOT AT ALL
7. FEELING AFRAID AS IF SOMETHING AWFUL MIGHT HAPPEN: NOT AT ALL
GAD7 TOTAL SCORE: 2
5. BEING SO RESTLESS THAT IT IS HARD TO SIT STILL: NOT AT ALL
1. FEELING NERVOUS, ANXIOUS, OR ON EDGE: SEVERAL DAYS
GAD7 TOTAL SCORE: 2
3. WORRYING TOO MUCH ABOUT DIFFERENT THINGS: NOT AT ALL
IF YOU CHECKED OFF ANY PROBLEMS ON THIS QUESTIONNAIRE, HOW DIFFICULT HAVE THESE PROBLEMS MADE IT FOR YOU TO DO YOUR WORK, TAKE CARE OF THINGS AT HOME, OR GET ALONG WITH OTHER PEOPLE: NOT DIFFICULT AT ALL
4. TROUBLE RELAXING: SEVERAL DAYS
2. NOT BEING ABLE TO STOP OR CONTROL WORRYING: NOT AT ALL

## 2023-12-01 ASSESSMENT — PATIENT HEALTH QUESTIONNAIRE - PHQ9
SUM OF ALL RESPONSES TO PHQ QUESTIONS 1-9: 5
SUM OF ALL RESPONSES TO PHQ QUESTIONS 1-9: 5
10. IF YOU CHECKED OFF ANY PROBLEMS, HOW DIFFICULT HAVE THESE PROBLEMS MADE IT FOR YOU TO DO YOUR WORK, TAKE CARE OF THINGS AT HOME, OR GET ALONG WITH OTHER PEOPLE: SOMEWHAT DIFFICULT

## 2023-12-04 RX ORDER — POTASSIUM CHLORIDE 1500 MG/1
20 TABLET, EXTENDED RELEASE ORAL DAILY
Qty: 90 TABLET | Refills: 3 | Status: SHIPPED | OUTPATIENT
Start: 2023-12-04 | End: 2023-12-08

## 2023-12-06 ENCOUNTER — MYC MEDICAL ADVICE (OUTPATIENT)
Dept: PHYSICAL MEDICINE AND REHAB | Facility: CLINIC | Age: 43
End: 2023-12-06

## 2023-12-06 ENCOUNTER — LAB (OUTPATIENT)
Dept: LAB | Facility: CLINIC | Age: 43
End: 2023-12-06
Payer: COMMERCIAL

## 2023-12-06 ENCOUNTER — VIRTUAL VISIT (OUTPATIENT)
Dept: PHYSICAL MEDICINE AND REHAB | Facility: CLINIC | Age: 43
End: 2023-12-06
Payer: COMMERCIAL

## 2023-12-06 DIAGNOSIS — G93.32 CHRONIC FATIGUE SYNDROME: ICD-10-CM

## 2023-12-06 DIAGNOSIS — S06.0X9A CONCUSSION WITH LOSS OF CONSCIOUSNESS, INITIAL ENCOUNTER: Primary | ICD-10-CM

## 2023-12-06 DIAGNOSIS — G43.009 MIGRAINE WITHOUT AURA AND WITHOUT STATUS MIGRAINOSUS, NOT INTRACTABLE: ICD-10-CM

## 2023-12-06 DIAGNOSIS — U09.9 LONG COVID: Primary | ICD-10-CM

## 2023-12-06 DIAGNOSIS — E87.6 HYPOKALEMIA: ICD-10-CM

## 2023-12-06 DIAGNOSIS — U09.9 LONG COVID: ICD-10-CM

## 2023-12-06 DIAGNOSIS — D75.839 THROMBOCYTOSIS: ICD-10-CM

## 2023-12-06 LAB
ANION GAP SERPL CALCULATED.3IONS-SCNC: 13 MMOL/L (ref 7–15)
BUN SERPL-MCNC: 11.4 MG/DL (ref 6–20)
CALCIUM SERPL-MCNC: 8.9 MG/DL (ref 8.6–10)
CHLORIDE SERPL-SCNC: 106 MMOL/L (ref 98–107)
CREAT SERPL-MCNC: 0.79 MG/DL (ref 0.51–0.95)
DEPRECATED HCO3 PLAS-SCNC: 24 MMOL/L (ref 22–29)
EGFRCR SERPLBLD CKD-EPI 2021: >90 ML/MIN/1.73M2
ERYTHROCYTE [DISTWIDTH] IN BLOOD BY AUTOMATED COUNT: 12.7 % (ref 10–15)
GLUCOSE SERPL-MCNC: 80 MG/DL (ref 70–99)
HCT VFR BLD AUTO: 38.3 % (ref 35–47)
HGB BLD-MCNC: 12.8 G/DL (ref 11.7–15.7)
MCH RBC QN AUTO: 28.9 PG (ref 26.5–33)
MCHC RBC AUTO-ENTMCNC: 33.4 G/DL (ref 31.5–36.5)
MCV RBC AUTO: 87 FL (ref 78–100)
PLATELET # BLD AUTO: 402 10E3/UL (ref 150–450)
POTASSIUM SERPL-SCNC: 3.7 MMOL/L (ref 3.4–5.3)
RBC # BLD AUTO: 4.43 10E6/UL (ref 3.8–5.2)
SODIUM SERPL-SCNC: 143 MMOL/L (ref 135–145)
WBC # BLD AUTO: 6.1 10E3/UL (ref 4–11)

## 2023-12-06 PROCEDURE — 36415 COLL VENOUS BLD VENIPUNCTURE: CPT

## 2023-12-06 PROCEDURE — 80048 BASIC METABOLIC PNL TOTAL CA: CPT

## 2023-12-06 PROCEDURE — 99213 OFFICE O/P EST LOW 20 MIN: CPT | Mod: VID | Performed by: PHYSICAL MEDICINE & REHABILITATION

## 2023-12-06 PROCEDURE — 85027 COMPLETE CBC AUTOMATED: CPT

## 2023-12-06 RX ORDER — GUANFACINE 1 MG/1
1 TABLET ORAL AT BEDTIME
Qty: 30 TABLET | Refills: 3 | Status: SHIPPED | OUTPATIENT
Start: 2023-12-06 | End: 2024-01-17

## 2023-12-06 RX ORDER — NALTREXONE HCL 1.5 MG
1.5 CAPSULE ORAL DAILY
Qty: 30 CAPSULE | Refills: 3 | Status: SHIPPED | OUTPATIENT
Start: 2023-12-06 | End: 2024-04-08

## 2023-12-06 RX ORDER — NALTREXONE HCL 1.5 MG
1.5 CAPSULE ORAL DAILY
Qty: 30 CAPSULE | Refills: 3 | Status: SHIPPED | OUTPATIENT
Start: 2023-12-06 | End: 2023-12-18

## 2023-12-06 NOTE — PROGRESS NOTES
Anahy is a 43 year old who is being evaluated via a billable video visit.      How would you like to obtain your AVS? MyChart  If the video visit is dropped, the invitation should be resent by: Text to cell phone: [phone]  Will anyone else be joining your video visit? No    Assessment and plan   Anahy Urena is a 43 year old female with history of thyroid tumor, and concussion with LOC who presents following an acute COVID infection in January 2022 leading to long COVID syndrome.  Her symptoms are mostly in the area of fatigue and brain fog.  Her objective findings include increased levels of ESR and CRP levels which are being closely monitored.    She has been OT which can be helpful.   Amantadine is very helpful but is causing severe anxiety. Modafinil is also helpful. She is taking it prn.   Will add Tenex and LDN for the brain fog/fatigue and aches/pain. Will then trial weaning her off the amantadine to limit the anxiety.   She has trailed OT/PT and both were unhelpful.   Counseled  on aerobic and movements. She is monitoring her steps, and yoga, which I concur with. She is able to go on a walk compared to before when she could not. She grades her fatigue today at 4/10. She is improving slowly but surely. We will continue the management.   Check CRP/LDH/ESR     RTC in 3 months   Maryann Cao MD, [facility]   Department of Rehabilitation         Subjective   Anahy is a 43 year old, presenting for the following health issues:  No chief complaint on file.  No chief complaint on file.    HPI   History of COVID-19 infection:   Date of first symptoms: 4 January 2022  Initial symptoms were fever, sweats, she does not remember most of the 4 days. She has had a 60 pound unexplained weight gain since that time. In the interim also was diagnosed with thyroid cancer and underwent partial thyroidectomy.      Diagnosis: clinical and home kit  Hospitalization: No  Treatment: no treatment   Vaccine   She had vaccine  plus booster. Pfizer       Current Symptoms:  She continues to be on steroids  She is on amantadine, on 100 mg BID. She feels she is getting anxious. It helps with her energy and fatigue as well as aches and pain. She has a history fo concussion with LOC.   She is taking modafinil as needed, for the brain fog.     She had an ablation,   She is supplemented with IV vitamins.   She had an increased creatinine levels.   She is still working as CPA FT.   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        Latest Reference Range & Units Most Recent 07/17/23 14:49 08/24/23 09:49 10/04/23 13:27   CRP Inflammation <5.00 mg/L 8.07 (H)  10/4/23 13:27 10.10 (H) 20.60 (H) 8.07 (H)   (H): Data is abnormally high          12/1/2023    10:24 AM   PHQ Assesment Total Score(s)   PHQ-9 Score 5         12/1/2023    10:24 AM   YONY-7 Results   YONY 7 TOTAL SCORE 2 (minimal anxiety)   YONY-7 Total Score 2         12/1/2023    10:25 AM   PTSD Screen Score   Have you ever experienced this kind of event? Yes   PTSD Screen (Score of 3 or more suggests positive screen) 0         12/1/2023    10:26 AM   PROMIS-29   PROMIS Physical Function T-Score 39 (moderate dysfunction)   PROMIS Anxiety T-Score 48 (within normal limits)   PROMIS Depression T-Score 41 (within normal limits)   PROMIS Fatigue T-Score 57 (mild)   PROMIS Sleep Disturbance T-Score 56 (mild)   PROMIS Ability to Participate in Social Roles & Activities T-Score 48 (within normal limits)   PROMIS Pain Interference T-Score 56 (mild)   PROMIS Pain Intensity 3         Past Medical History:   Diagnosis Date     Abnormal uterine bleeding 2020     Acne vulgaris      Anemia 1988     Anxiety      Arthritis 1/15/2022    RA is negative     Asthma      Concussion 05/2007     Eczema 1993     Endometriosis      GERD (gastroesophageal reflux disease)      H/O partial thyroidectomy      HTN (hypertension)       "Hypertension 5/15/2022     Infection due to 2019 novel coronavirus 01/04/2022     Long COVID      Major depression, single episode 02/07/2023     papillary thyroid carcinoma 08/16/2022    left 0.4 cm     PCOS (polycystic ovarian syndrome)      Pink eye disease of both eyes 10/24/2018     Rosacea      Thrombocytosis        Past Surgical History:   Procedure Laterality Date     ABDOMEN SURGERY  2001, 2020    laparoscopy on ovaries     ANKLE SURGERY       BIOPSY  neck 1999     BRACHIAL PLEXUS EXPLORATION       ENT SURGERY  1999    neck tumor     exploratory laparscopy  2001    endometriosis     EYE SURGERY  2015     GYN SURGERY       HEAD & NECK SURGERY  1999     LAPAROSCOPIC ENDOMETRIOSIS FULGURATION       LAPAROSCOPIC SALPINGECTOMY N/A 07/20/2023    Procedure: LAPAROSCOPY WITH LEFT SALPINGO OOPHORECTOMY RIGHT SALPINGECTOMY,HYSTEROSCOPY, WITH DILATION AND CURETTAGE, ENDOMETRIAL ABLATION, REPAIR OF CERVICAL LACERATION;  Surgeon: Jossy Rodriguez MD;  Location: [facility]     ORTHOPEDIC SURGERY  2019, 2022     DC HYSTEROSCOPY,W/ENDO BX N/A 11/30/2020    Procedure: HYSTEROSCOPY WITH DILATION AND CURETTAGE, CERVICAL POLYPECTOMY AND PLACEMENT OF MIRENA INTRAUTERINE DEVICE;  Surgeon: Jossy Rodriguez MD;  Location: [facility];  Service: Gynecology     TEAR DUCT SURGERY  1983       Family History   Problem Relation Age of Onset     Hypertension Mother      Asthma Mother      Genetic Disorder Mother         Bone deformation; hand bone deformity; \"Elizabeth hands\";     Hyperlipidemia Father      Anxiety Disorder Brother      Mental Illness Brother         Torettes Syndrome, OCD, ODDS     Genetic Disorder Brother         Bone deformation;hand deformity he was born with ; brachydactyly     Diabetes Maternal Grandfather      Mental Illness Paternal Grandfather         OCD     Depression Daughter      Anxiety Disorder Daughter      Asthma Daughter      Anxiety Disorder Daughter      Asthma Daughter      Breast " Cancer Maternal Aunt      Other Cancer Maternal Aunt         She had ovarian, cervical, brain, lymphoma     Thyroid Cancer Maternal Aunt         type unknown     Leukemia Maternal Aunt         chidlhood     Brain Tumor Maternal Aunt      Depression Maternal Aunt      Hyperthyroidism Maternal Aunt         Also her daughter has thyroid issues     Gestational Diabetes Maternal Aunt      Diabetes Maternal Uncle      Depression Paternal Aunt      Breast Cancer Paternal Aunt      Anxiety Disorder Paternal Aunt      Prostate Cancer Paternal Uncle      Asthma Niece      Asthma Nephew      Thyroid Disease Cousin        Social History     Tobacco Use     Smoking status: Never     Smokeless tobacco: Never   Vaping Use     Vaping Use: Never used   Substance Use Topics     Alcohol use: Yes     Comment: Socially     Drug use: Never         Current Outpatient Medications:      albuterol (PROAIR HFA;PROVENTIL HFA;VENTOLIN HFA) 90 mcg/actuation inhaler, Inhale 2 puffs into the lungs every 6 hours as needed , Disp: , Rfl:      amantadine (SYMMETREL) 100 MG capsule, Take 1 capsule (100 mg) by mouth daily, Disp: , Rfl:      clobetasol propionate (CLOBEX) 0.05 % external shampoo, 02/22/2022 Clobetasol Shampoo 0.05 %      02/22/2022  active, Disp: , Rfl:      eletriptan (RELPAX) 20 MG tablet, Take 1-2 tablets (20-40 mg) by mouth at onset of headache for migraine May repeat in 2 hours. Max 4 tablets/24 hours., Disp: 30 tablet, Rfl: 3     furosemide (LASIX) 20 MG tablet, Take 2 tablets in the AM and one tablet in the PM, Disp: 270 tablet, Rfl: 3     levofloxacin (LEVAQUIN) 750 MG tablet, Take 1 tablet (750 mg) by mouth daily, Disp: 5 tablet, Rfl: 0     levothyroxine (SYNTHROID/LEVOTHROID) 100 MCG tablet, Take 1 tablet (100 mcg) by mouth daily, Disp: 90 tablet, Rfl: 1     loratadine-pseudoePHEDrine (CLARITIN-D 24 HOUR)  MG 24 hr tablet, Take 1 tablet by mouth daily, Disp: 30 tablet, Rfl: 11     LORazepam (ATIVAN) 1 MG tablet, Take 1  tablet (1 mg) by mouth 3 times daily as needed for anxiety or sleep, Disp: 90 tablet, Rfl: 5     Loteprednol Etabonate (LOTEMAX SM) 0.38 % GEL, Apply 1 drop to eye, Disp: , Rfl:      modafinil (PROVIGIL) 100 MG tablet, Take one tablet by mouth once a day, may take additional tablet daily if needed for brain fog/fatigue, Disp: 30 tablet, Rfl: 3     ondansetron (ZOFRAN ODT) 4 MG ODT tab, Take 1 tablet (4 mg) by mouth every 8 hours as needed for nausea, Disp: 10 tablet, Rfl: 0     pantoprazole (PROTONIX) 40 MG EC tablet, Take 1 tablet (40 mg) by mouth 2 times daily Further refills per PCP, Disp: 180 tablet, Rfl: 3     potassium chloride ER (K-TAB) 20 MEQ CR tablet, Take 1 tablet (20 mEq) by mouth daily, Disp: 90 tablet, Rfl: 3     predniSONE (DELTASONE) 5 MG tablet, Take 1 tablet (5 mg) by mouth daily, Disp: 30 tablet, Rfl: 4      Review of Systems   As above       Objective           Vitals:  No vitals were obtained today due to virtual visit.    Physical Exam   GENERAL: Healthy, alert and no distress  EYES: Eyes grossly normal to inspection.  No discharge or erythema, or obvious scleral/conjunctival abnormalities.  RESP: No audible wheeze, cough, or visible cyanosis.  No visible retractions or increased work of breathing.    SKIN: Visible skin clear. No significant rash, abnormal pigmentation or lesions.  NEURO: Cranial nerves grossly intact.  Mentation and speech appropriate for age.  PSYCH: Mentation appears normal, affect normal/bright, judgement and insight intact, normal speech and appearance well-groomed.    Diagnostic Test Results  Creatinine was high   CRP high    Latest Reference Range & Units Most Recent   Sodium 135 - 145 mmol/L 139  11/27/23 09:24   Potassium 3.5 - 5.0 mmol/L 3.7  8/16/21 13:22   Potassium 3.4 - 5.3 mmol/L 3.7  11/27/23 09:24   Chloride 98 - 107 mmol/L 109 (H)  8/16/21 13:22   Chloride 98 - 107 mmol/L 104  11/27/23 09:24   Carbon Dioxide 22 - 31 mmol/L 21 (L)  8/16/21 13:22   Carbon  Dioxide (CO2) 22 - 29 mmol/L 24  11/27/23 09:24   Urea Nitrogen 8 - 22 mg/dL 10  8/16/21 13:22   Urea Nitrogen 6.0 - 20.0 mg/dL 11.0  11/27/23 09:24   Creatinine 0.51 - 0.95 mg/dL 0.76  11/27/23 09:24   GFR Estimate >60 mL/min/1.73m2 >90  11/27/23 09:24   GFR Estimate If Black >60 mL/min/1.7m2 >90  5/5/07 05:30   Calcium 8.6 - 10.0 mg/dL 9.3  11/27/23 09:24   Anion Gap 7 - 15 mmol/L 11  11/27/23 09:24   Magnesium 1.8 - 2.6 mg/dL 2.2  8/11/21 09:33   Albumin 3.5 - 5.2 g/dL 4.6  10/4/23 13:27   Protein Total 6.4 - 8.3 g/dL 7.7  10/4/23 13:27   Alkaline Phosphatase 35 - 104 U/L 134 (H)  10/4/23 13:27   ALT 0 - 50 U/L 12  10/4/23 13:27   AST 0 - 45 U/L 20  10/4/23 13:27   Bilirubin Total <=1.2 mg/dL 0.2  10/4/23 13:27   Cholesterol <200 mg/dL 209 (H)  10/4/23 13:27   CRP Inflammation <5.00 mg/L 8.07 (H)  10/4/23 13:27   CRP 0.0-<0.8 mg/dL 0.8 (H)  8/16/21 13:22   Glucose 70 - 99 mg/dL 87  11/27/23 09:24   POC Preg, Urine Negative  Negative  11/30/20 11:24   HCG Qual Urine Negative  Negative  7/20/23 09:01   HCG Qualitative Serum Negative  Negative  8/11/21 21:51   HDL Cholesterol >=50 mg/dL 75  10/4/23 13:27   Hemoglobin A1C 0.0 - 5.6 % 5.3  4/4/23 11:30   (H): Data is abnormally high  (L): Data is abnormally lo        Video-Visit Details    Type of service:  Video Visit     Originating Location (pt. Location): Home    Distant Location (provider location):  Off-site  Platform used for Video Visit: Augusto

## 2023-12-06 NOTE — NURSING NOTE
"Is the patient currently in the state of [place]? YES    Visit mode:VIDEO    If the visit is dropped, the patient can be reconnected by: VIDEO VISIT: Text to cell phone:   Telephone Information:   Mobile [phone]       Will anyone else be joining the visit? NO  (If patient encounters technical issues they should call [phone])    How would you like to obtain your AVS? MyChart    Are changes needed to the allergy or medication list? Pt declined allergy review, Pt declined med review, and patient stated that \"had labs drawn this morning, don't think I need to re-check in and reverify everything again.\"    Reason for visit: RECHECK    Lisbet Kelly VVF      "

## 2023-12-06 NOTE — PROGRESS NOTES
"Virtual Visit Details    Type of service:  Video Visit     Originating Location (pt. Location): {video visit patient location:224310::\"Home\"}  {PROVIDER LOCATION On-site should be selected for visits conducted from your clinic location or adjoining [facility] hospital, academic office, or other nearby [facility] building. Off-site should be selected for all other provider locations, including home:[id number]}  Distant Location (provider location):  {virtual location provider:[id number]}  Platform used for Video Visit: {Virtual Visit Platforms:694755::\"[employer]\"}  "

## 2023-12-06 NOTE — LETTER
12/6/2023       RE: Anahy Urena  [address] [facility] [address]     Dear Colleague,    Thank you for referring your patient, Anahy Urena, to the [facility] PHYSICAL MEDICINE [facility]HABILITATION [facility]IN[facility] [place] [facility]. Please see a copy of my visit note below.    Anahy is a 43 year old who is being evaluated via a billable video visit.      How would you like to obtain your AVS? MyChart  If the video visit is dropped, the invitation should be resent by: Text to cell phone: [phone]  Will anyone else be joining your video visit? No    Assessment and plan   Anahy Urena is a 43 year old female with history of thyroid tumor, and concussion with LOC who presents following an acute COVID infection in January 2022 leading to long COVID syndrome.  Her symptoms are mostly in the area of fatigue and brain fog.  Her objective findings include increased levels of ESR and CRP levels which are being closely monitored.    She has been OT which can be helpful.   Amantadine is very helpful but is causing severe anxiety. Modafinil is also helpful. She is taking it prn.   Will add Tenex and LDN for the brain fog/fatigue and aches/pain. Will then trial weaning her off the amantadine to limit the anxiety.   She has trailed OT/PT and both were unhelpful.   Counseled  on aerobic and movements. She is monitoring her steps, and yoga, which I concur with. She is able to go on a walk compared to before when she could not. She grades her fatigue today at 4/10. She is improving slowly but surely. We will continue the management.   Check CRP/LDH/ESR     RTC in 3 months   Maryann Cao MD, A   Department of Rehabilitation         Subjective   Anahy is a 43 year old, presenting for the following health issues:  No chief complaint on file.  No chief complaint on file.    HPI   History of COVID-19 infection:   Date of first symptoms: 4 January  2022  Initial symptoms were fever, sweats, she does not remember most of the 4 days. She has had a 60 pound unexplained weight gain since that time. In the interim also was diagnosed with thyroid cancer and underwent partial thyroidectomy.      Diagnosis: clinical and home kit  Hospitalization: No  Treatment: no treatment   Vaccine   She had vaccine plus booster. Pfizer       Current Symptoms:  She continues to be on steroids  She is on amantadine, on 100 mg BID. She feels she is getting anxious. It helps with her energy and fatigue as well as aches and pain. She has a history fo concussion with LOC.   She is taking modafinil as needed, for the brain fog.     She had an ablation,   She is supplemented with IV vitamins.   She had an increased creatinine levels.   She is still working as CPA FT.   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        Latest Reference Range & Units Most Recent 07/17/23 14:49 08/24/23 09:49 10/04/23 13:27   CRP Inflammation <5.00 mg/L 8.07 (H)  10/4/23 13:27 10.10 (H) 20.60 (H) 8.07 (H)   (H): Data is abnormally high          12/1/2023    10:24 AM   PHQ Assesment Total Score(s)   PHQ-9 Score 5         12/1/2023    10:24 AM   YONY-7 Results   YONY 7 TOTAL SCORE 2 (minimal anxiety)   YONY-7 Total Score 2         12/1/2023    10:25 AM   PTSD Screen Score   Have you ever experienced this kind of event? Yes   PTSD Screen (Score of 3 or more suggests positive screen) 0         12/1/2023    10:26 AM   PROMIS-29   PROMIS Physical Function T-Score 39 (moderate dysfunction)   PROMIS Anxiety T-Score 48 (within normal limits)   PROMIS Depression T-Score 41 (within normal limits)   PROMIS Fatigue T-Score 57 (mild)   PROMIS Sleep Disturbance T-Score 56 (mild)   PROMIS Ability to Participate in Social Roles & Activities T-Score 48 (within normal limits)   PROMIS Pain Interference T-Score 56 (mild)   PROMIS Pain Intensity 3  "        Past Medical History:   Diagnosis Date    Abnormal uterine bleeding 2020    Acne vulgaris     Anemia 1988    Anxiety     Arthritis 1/15/2022    RA is negative    Asthma     Concussion 05/2007    Eczema 1993    Endometriosis     GERD (gastroesophageal reflux disease)     H/O partial thyroidectomy     HTN (hypertension)     Hypertension 5/15/2022    Infection due to 2019 novel coronavirus 01/04/2022    Long COVID     Major depression, single episode 02/07/2023    papillary thyroid carcinoma 08/16/2022    left 0.4 cm    PCOS (polycystic ovarian syndrome)     Pink eye disease of both eyes 10/24/2018    Rosacea     Thrombocytosis        Past Surgical History:   Procedure Laterality Date    ABDOMEN SURGERY  2001, 2020    laparoscopy on ovaries    ANKLE SURGERY      BIOPSY  neck 1999    BRACHIAL PLEXUS EXPLORATION      ENT SURGERY  1999    neck tumor    exploratory laparscopy  2001    endometriosis    EYE SURGERY  2015    GYN SURGERY      HEAD & NECK SURGERY  1999    LAPAROSCOPIC ENDOMETRIOSIS FULGURATION      LAPAROSCOPIC SALPINGECTOMY N/A 07/20/2023    Procedure: LAPAROSCOPY WITH LEFT SALPINGO OOPHORECTOMY RIGHT SALPINGECTOMY,HYSTEROSCOPY, WITH DILATION AND CURETTAGE, ENDOMETRIAL ABLATION, REPAIR OF CERVICAL LACERATION;  Surgeon: Jossy Rodriguez MD;  Location: [facility]    ORTHOPEDIC SURGERY  2019, 2022    NC HYSTEROSCOPY,W/ENDO BX N/A 11/30/2020    Procedure: HYSTEROSCOPY WITH DILATION AND CURETTAGE, CERVICAL POLYPECTOMY AND PLACEMENT OF MIRENA INTRAUTERINE DEVICE;  Surgeon: Jossy Rodriguez MD;  Location: [facility];  Service: Gynecology    TEAR DUCT SURGERY  1983       Family History   Problem Relation Age of Onset    Hypertension Mother     Asthma Mother     Genetic Disorder Mother         Bone deformation; hand bone deformity; \"Elizabeth hands\";    Hyperlipidemia Father     Anxiety Disorder Brother     Mental Illness Brother         Torettes Syndrome, OCD, ODDS    Genetic Disorder " Brother         Bone deformation;hand deformity he was born with ; brachydactyly    Diabetes Maternal Grandfather     Mental Illness Paternal Grandfather         OCD    Depression Daughter     Anxiety Disorder Daughter     Asthma Daughter     Anxiety Disorder Daughter     Asthma Daughter     Breast Cancer Maternal Aunt     Other Cancer Maternal Aunt         She had ovarian, cervical, brain, lymphoma    Thyroid Cancer Maternal Aunt         type unknown    Leukemia Maternal Aunt         chidlhood    Brain Tumor Maternal Aunt     Depression Maternal Aunt     Hyperthyroidism Maternal Aunt         Also her daughter has thyroid issues    Gestational Diabetes Maternal Aunt     Diabetes Maternal Uncle     Depression Paternal Aunt     Breast Cancer Paternal Aunt     Anxiety Disorder Paternal Aunt     Prostate Cancer Paternal Uncle     Asthma Niece     Asthma Nephew     Thyroid Disease Cousin        Social History     Tobacco Use    Smoking status: Never    Smokeless tobacco: Never   Vaping Use    Vaping Use: Never used   Substance Use Topics    Alcohol use: Yes     Comment: Socially    Drug use: Never         Current Outpatient Medications:     albuterol (PROAIR HFA;PROVENTIL HFA;VENTOLIN HFA) 90 mcg/actuation inhaler, Inhale 2 puffs into the lungs every 6 hours as needed , Disp: , Rfl:     amantadine (SYMMETREL) 100 MG capsule, Take 1 capsule (100 mg) by mouth daily, Disp: , Rfl:     clobetasol propionate (CLOBEX) 0.05 % external shampoo, 02/22/2022 Clobetasol Shampoo 0.05 %      02/22/2022  active, Disp: , Rfl:     eletriptan (RELPAX) 20 MG tablet, Take 1-2 tablets (20-40 mg) by mouth at onset of headache for migraine May repeat in 2 hours. Max 4 tablets/24 hours., Disp: 30 tablet, Rfl: 3    furosemide (LASIX) 20 MG tablet, Take 2 tablets in the AM and one tablet in the PM, Disp: 270 tablet, Rfl: 3    levofloxacin (LEVAQUIN) 750 MG tablet, Take 1 tablet (750 mg) by mouth daily, Disp: 5 tablet, Rfl: 0    levothyroxine  (SYNTHROID/LEVOTHROID) 100 MCG tablet, Take 1 tablet (100 mcg) by mouth daily, Disp: 90 tablet, Rfl: 1    loratadine-pseudoePHEDrine (CLARITIN-D 24 HOUR)  MG 24 hr tablet, Take 1 tablet by mouth daily, Disp: 30 tablet, Rfl: 11    LORazepam (ATIVAN) 1 MG tablet, Take 1 tablet (1 mg) by mouth 3 times daily as needed for anxiety or sleep, Disp: 90 tablet, Rfl: 5    Loteprednol Etabonate (LOTEMAX SM) 0.38 % GEL, Apply 1 drop to eye, Disp: , Rfl:     modafinil (PROVIGIL) 100 MG tablet, Take one tablet by mouth once a day, may take additional tablet daily if needed for brain fog/fatigue, Disp: 30 tablet, Rfl: 3    ondansetron (ZOFRAN ODT) 4 MG ODT tab, Take 1 tablet (4 mg) by mouth every 8 hours as needed for nausea, Disp: 10 tablet, Rfl: 0    pantoprazole (PROTONIX) 40 MG EC tablet, Take 1 tablet (40 mg) by mouth 2 times daily Further refills per PCP, Disp: 180 tablet, Rfl: 3    potassium chloride ER (K-TAB) 20 MEQ CR tablet, Take 1 tablet (20 mEq) by mouth daily, Disp: 90 tablet, Rfl: 3    predniSONE (DELTASONE) 5 MG tablet, Take 1 tablet (5 mg) by mouth daily, Disp: 30 tablet, Rfl: 4      Review of Systems   As above      Objective           Vitals:  No vitals were obtained today due to virtual visit.    Physical Exam   GENERAL: Healthy, alert and no distress  EYES: Eyes grossly normal to inspection.  No discharge or erythema, or obvious scleral/conjunctival abnormalities.  RESP: No audible wheeze, cough, or visible cyanosis.  No visible retractions or increased work of breathing.    SKIN: Visible skin clear. No significant rash, abnormal pigmentation or lesions.  NEURO: Cranial nerves grossly intact.  Mentation and speech appropriate for age.  PSYCH: Mentation appears normal, affect normal/bright, judgement and insight intact, normal speech and appearance well-groomed.    Diagnostic Test Results  Creatinine was high   CRP high    Latest Reference Range & Units Most Recent   Sodium 135 - 145 mmol/L 139  11/27/23  09:24   Potassium 3.5 - 5.0 mmol/L 3.7  8/16/21 13:22   Potassium 3.4 - 5.3 mmol/L 3.7  11/27/23 09:24   Chloride 98 - 107 mmol/L 109 (H)  8/16/21 13:22   Chloride 98 - 107 mmol/L 104  11/27/23 09:24   Carbon Dioxide 22 - 31 mmol/L 21 (L)  8/16/21 13:22   Carbon Dioxide (CO2) 22 - 29 mmol/L 24  11/27/23 09:24   Urea Nitrogen 8 - 22 mg/dL 10  8/16/21 13:22   Urea Nitrogen 6.0 - 20.0 mg/dL 11.0  11/27/23 09:24   Creatinine 0.51 - 0.95 mg/dL 0.76  11/27/23 09:24   GFR Estimate >60 mL/min/1.73m2 >90  11/27/23 09:24   GFR Estimate If Black >60 mL/min/1.7m2 >90  5/5/07 05:30   Calcium 8.6 - 10.0 mg/dL 9.3  11/27/23 09:24   Anion Gap 7 - 15 mmol/L 11  11/27/23 09:24   Magnesium 1.8 - 2.6 mg/dL 2.2  8/11/21 09:33   Albumin 3.5 - 5.2 g/dL 4.6  10/4/23 13:27   Protein Total 6.4 - 8.3 g/dL 7.7  10/4/23 13:27   Alkaline Phosphatase 35 - 104 U/L 134 (H)  10/4/23 13:27   ALT 0 - 50 U/L 12  10/4/23 13:27   AST 0 - 45 U/L 20  10/4/23 13:27   Bilirubin Total <=1.2 mg/dL 0.2  10/4/23 13:27   Cholesterol <200 mg/dL 209 (H)  10/4/23 13:27   CRP Inflammation <5.00 mg/L 8.07 (H)  10/4/23 13:27   CRP 0.0-<0.8 mg/dL 0.8 (H)  8/16/21 13:22   Glucose 70 - 99 mg/dL 87  11/27/23 09:24   POC Preg, Urine Negative  Negative  11/30/20 11:24   HCG Qual Urine Negative  Negative  7/20/23 09:01   HCG Qualitative Serum Negative  Negative  8/11/21 21:51   HDL Cholesterol >=50 mg/dL 75  10/4/23 13:27   Hemoglobin A1C 0.0 - 5.6 % 5.3  4/4/23 11:30   (H): Data is abnormally high  (L): Data is abnormally lo           Again, thank you for allowing me to participate in the care of your patient.      Sincerely,    Maryann Cao MD

## 2023-12-08 RX ORDER — POTASSIUM CHLORIDE 1500 MG/1
20 TABLET, EXTENDED RELEASE ORAL 2 TIMES DAILY
Qty: 180 TABLET | Refills: 3 | Status: SHIPPED | OUTPATIENT
Start: 2023-12-08 | End: 2024-09-05

## 2023-12-13 ENCOUNTER — LAB (OUTPATIENT)
Dept: LAB | Facility: CLINIC | Age: 43
End: 2023-12-13
Payer: COMMERCIAL

## 2023-12-13 DIAGNOSIS — D75.839 THROMBOCYTOSIS: ICD-10-CM

## 2023-12-13 DIAGNOSIS — E87.6 HYPOKALEMIA: ICD-10-CM

## 2023-12-13 LAB
ANION GAP SERPL CALCULATED.3IONS-SCNC: 11 MMOL/L (ref 7–15)
BUN SERPL-MCNC: 15.7 MG/DL (ref 6–20)
CALCIUM SERPL-MCNC: 9.8 MG/DL (ref 8.6–10)
CHLORIDE SERPL-SCNC: 100 MMOL/L (ref 98–107)
CREAT SERPL-MCNC: 0.93 MG/DL (ref 0.51–0.95)
DEPRECATED HCO3 PLAS-SCNC: 27 MMOL/L (ref 22–29)
EGFRCR SERPLBLD CKD-EPI 2021: 78 ML/MIN/1.73M2
ERYTHROCYTE [DISTWIDTH] IN BLOOD BY AUTOMATED COUNT: 12.6 % (ref 10–15)
GLUCOSE SERPL-MCNC: 82 MG/DL (ref 70–99)
HCT VFR BLD AUTO: 40.8 % (ref 35–47)
HGB BLD-MCNC: 13.7 G/DL (ref 11.7–15.7)
MCH RBC QN AUTO: 28.8 PG (ref 26.5–33)
MCHC RBC AUTO-ENTMCNC: 33.6 G/DL (ref 31.5–36.5)
MCV RBC AUTO: 86 FL (ref 78–100)
PLATELET # BLD AUTO: 467 10E3/UL (ref 150–450)
POTASSIUM SERPL-SCNC: 3.7 MMOL/L (ref 3.4–5.3)
RBC # BLD AUTO: 4.75 10E6/UL (ref 3.8–5.2)
SODIUM SERPL-SCNC: 138 MMOL/L (ref 135–145)
WBC # BLD AUTO: 8.7 10E3/UL (ref 4–11)

## 2023-12-13 PROCEDURE — 80048 BASIC METABOLIC PNL TOTAL CA: CPT

## 2023-12-13 PROCEDURE — 85027 COMPLETE CBC AUTOMATED: CPT

## 2023-12-13 PROCEDURE — 36415 COLL VENOUS BLD VENIPUNCTURE: CPT

## 2023-12-17 ENCOUNTER — MYC REFILL (OUTPATIENT)
Dept: FAMILY MEDICINE | Facility: CLINIC | Age: 43
End: 2023-12-17
Payer: COMMERCIAL

## 2023-12-17 DIAGNOSIS — S06.0X9A CONCUSSION WITH LOSS OF CONSCIOUSNESS, INITIAL ENCOUNTER: ICD-10-CM

## 2023-12-18 RX ORDER — AMANTADINE HYDROCHLORIDE 100 MG/1
100 CAPSULE, GELATIN COATED ORAL 2 TIMES DAILY
Qty: 180 CAPSULE | Refills: 3 | Status: SHIPPED | OUTPATIENT
Start: 2023-12-18 | End: 2024-07-22

## 2023-12-19 DIAGNOSIS — S06.0X9A CONCUSSION WITH LOSS OF CONSCIOUSNESS, INITIAL ENCOUNTER: ICD-10-CM

## 2023-12-19 DIAGNOSIS — U09.9 LONG COVID: Primary | ICD-10-CM

## 2023-12-19 RX ORDER — GUANFACINE 1 MG/1
1 TABLET, EXTENDED RELEASE ORAL AT BEDTIME
Qty: 30 TABLET | Refills: 3 | Status: SHIPPED | OUTPATIENT
Start: 2023-12-19 | End: 2024-04-08

## 2023-12-20 ENCOUNTER — LAB (OUTPATIENT)
Dept: LAB | Facility: CLINIC | Age: 43
End: 2023-12-20
Payer: COMMERCIAL

## 2023-12-20 ENCOUNTER — MYC MEDICAL ADVICE (OUTPATIENT)
Dept: FAMILY MEDICINE | Facility: CLINIC | Age: 43
End: 2023-12-20

## 2023-12-20 DIAGNOSIS — E87.6 HYPOKALEMIA: ICD-10-CM

## 2023-12-20 DIAGNOSIS — E89.0 HISTORY OF PARTIAL THYROIDECTOMY: Primary | ICD-10-CM

## 2023-12-20 DIAGNOSIS — D75.839 THROMBOCYTOSIS: ICD-10-CM

## 2023-12-20 DIAGNOSIS — U09.9 LONG COVID: ICD-10-CM

## 2023-12-20 LAB
ANION GAP SERPL CALCULATED.3IONS-SCNC: 14 MMOL/L (ref 7–15)
BUN SERPL-MCNC: 14.7 MG/DL (ref 6–20)
CALCIUM SERPL-MCNC: 9.6 MG/DL (ref 8.6–10)
CHLORIDE SERPL-SCNC: 103 MMOL/L (ref 98–107)
CREAT SERPL-MCNC: 0.88 MG/DL (ref 0.51–0.95)
CRP SERPL-MCNC: <3 MG/L
DEPRECATED HCO3 PLAS-SCNC: 24 MMOL/L (ref 22–29)
EGFRCR SERPLBLD CKD-EPI 2021: 83 ML/MIN/1.73M2
ERYTHROCYTE [DISTWIDTH] IN BLOOD BY AUTOMATED COUNT: 12.5 % (ref 10–15)
ERYTHROCYTE [SEDIMENTATION RATE] IN BLOOD BY WESTERGREN METHOD: 13 MM/HR (ref 0–20)
GLUCOSE SERPL-MCNC: 84 MG/DL (ref 70–99)
HCT VFR BLD AUTO: 42.2 % (ref 35–47)
HGB BLD-MCNC: 14.3 G/DL (ref 11.7–15.7)
MCH RBC QN AUTO: 28.9 PG (ref 26.5–33)
MCHC RBC AUTO-ENTMCNC: 33.9 G/DL (ref 31.5–36.5)
MCV RBC AUTO: 85 FL (ref 78–100)
PLATELET # BLD AUTO: 434 10E3/UL (ref 150–450)
POTASSIUM SERPL-SCNC: 3.6 MMOL/L (ref 3.4–5.3)
RBC # BLD AUTO: 4.94 10E6/UL (ref 3.8–5.2)
SODIUM SERPL-SCNC: 141 MMOL/L (ref 135–145)
WBC # BLD AUTO: 7.5 10E3/UL (ref 4–11)

## 2023-12-20 PROCEDURE — 36415 COLL VENOUS BLD VENIPUNCTURE: CPT

## 2023-12-20 PROCEDURE — 85652 RBC SED RATE AUTOMATED: CPT | Mod: VID | Performed by: PHYSICAL MEDICINE & REHABILITATION

## 2023-12-20 PROCEDURE — 85027 COMPLETE CBC AUTOMATED: CPT

## 2023-12-20 PROCEDURE — 80048 BASIC METABOLIC PNL TOTAL CA: CPT

## 2023-12-20 PROCEDURE — 83615 LACTATE (LD) (LDH) ENZYME: CPT

## 2023-12-20 PROCEDURE — 86140 C-REACTIVE PROTEIN: CPT

## 2023-12-21 LAB — LDH SERPL L TO P-CCNC: 192 U/L (ref 0–250)

## 2024-01-17 ENCOUNTER — VIRTUAL VISIT (OUTPATIENT)
Dept: FAMILY MEDICINE | Facility: CLINIC | Age: 44
End: 2024-01-17
Payer: COMMERCIAL

## 2024-01-17 DIAGNOSIS — M95.9 BONE DEFORMITY: Primary | ICD-10-CM

## 2024-01-17 DIAGNOSIS — S06.0X9A CONCUSSION WITH LOSS OF CONSCIOUSNESS, INITIAL ENCOUNTER: ICD-10-CM

## 2024-01-17 DIAGNOSIS — J30.2 SEASONAL ALLERGIC RHINITIS, UNSPECIFIED TRIGGER: ICD-10-CM

## 2024-01-17 DIAGNOSIS — M79.7 FIBROMYALGIA: ICD-10-CM

## 2024-01-17 DIAGNOSIS — C73 PAPILLARY THYROID CARCINOMA (H): ICD-10-CM

## 2024-01-17 PROCEDURE — 99214 OFFICE O/P EST MOD 30 MIN: CPT | Mod: 95 | Performed by: NURSE PRACTITIONER

## 2024-01-17 RX ORDER — LORATADINE PSEUDOEPHEDRINE SULFATE 10; 240 MG/1; MG/1
1 TABLET, EXTENDED RELEASE ORAL DAILY
Qty: 90 TABLET | Refills: 3 | Status: SHIPPED | OUTPATIENT
Start: 2024-01-17

## 2024-01-17 RX ORDER — MODAFINIL 100 MG/1
TABLET ORAL
Qty: 90 TABLET | Refills: 1 | Status: SHIPPED | OUTPATIENT
Start: 2024-01-17 | End: 2024-02-14

## 2024-01-17 RX ORDER — LORAZEPAM 1 MG/1
1 TABLET ORAL 3 TIMES DAILY PRN
Qty: 90 TABLET | Refills: 5 | Status: SHIPPED | OUTPATIENT
Start: 2024-01-17

## 2024-01-17 NOTE — PROGRESS NOTES
Anahy is a 43 year old who is being evaluated via a billable video visit.      How would you like to obtain your AVS? MyChart  If the video visit is dropped, the invitation should be resent by: Text to cell phone: [phone]  Will anyone else be joining your video visit? No          Assessment & Plan     Bone deformity  - would like to discuss with genetic counselor about possible genetic bone deformity   - Adult Oncology/Hematology  Referral    Papillary thyroid carcinoma (H)  monitor    Fibromyalgia  Medication refilled  - LORazepam (ATIVAN) 1 MG tablet  Dispense: 90 tablet; Refill: 5    Concussion with loss of consciousness, initial encounter  - continue with medication  - modafinil (PROVIGIL) 100 MG tablet  Dispense: 90 tablet; Refill: 1    Seasonal allergic rhinitis, unspecified trigger  Medication refilled  - loratadine-pseudoePHEDrine (CLARITIN-D 24 HOUR)  MG 24 hr tablet  Dispense: 90 tablet; Refill: 3    Subjective   Anahy is a 43 year old, presenting for the following health issues:  Follow Up (Long term care plan)        1/17/2024     8:12 AM   Additional Questions   Roomed by Jyothi HELMS     History of Present Illness       Reason for visit:  Follow-up & confirm status/plans - update    She eats 4 or more servings of fruits and vegetables daily.She consumes 0 sweetened beverage(s) daily.She exercises with enough effort to increase her heart rate 9 or less minutes per day.  She exercises with enough effort to increase her heart rate 3 or less days per week.   She is taking medications regularly.       Considering possible surgery referral    Now on guanfacine for long covid, doing well on extended release, weaning off amantadine would like to get off.     On naltrexone compound, for long term pain and help.    Going to see Endocrinology NP at [facility] and she is going to transfer care    Going to do labs in a couple weeks for potassium, dose seems to be helping keeping potassium level  "normal    Back having night sweats, thyroid level slightly suppressed, could be perimenopause.     Minimal spotting with ablation      Objective    Vitals - Patient Reported  Weight (Patient Reported): 69.4 kg (153 lb)  Height (Patient Reported): 165.1 cm (5' 5\")  BMI (Based on Pt Reported Ht/Wt): 25.46      Vitals:  No vitals were obtained today due to virtual visit.      Review of Systems  Constitutional, HEENT, cardiovascular, pulmonary, gi and gu systems are negative, except as otherwise noted.  Physical Exam   GENERAL: alert and no distress  EYES: Eyes grossly normal to inspection.  No discharge or erythema, or obvious scleral/conjunctival abnormalities.  RESP: No audible wheeze, cough, or visible cyanosis.    SKIN: Visible skin clear. No significant rash, abnormal pigmentation or lesions.  NEURO: Cranial nerves grossly intact.  Mentation and speech appropriate for age.  PSYCH: Appropriate affect, tone, and pace of words          Video-Visit Details    Type of service:  Video Visit     Originating Location (pt. Location): Home    Distant Location (provider location):  On-site  Platform used for Video Visit: Augusto  Signed Electronically by: Kelsie Garcia NP    "

## 2024-01-18 ENCOUNTER — TRANSCRIBE ORDERS (OUTPATIENT)
Dept: OTHER | Age: 44
End: 2024-01-18

## 2024-01-18 DIAGNOSIS — M95.9 BONE DEFORMITY: Primary | ICD-10-CM

## 2024-01-30 ENCOUNTER — TELEPHONE (OUTPATIENT)
Dept: FAMILY MEDICINE | Facility: CLINIC | Age: 44
End: 2024-01-30

## 2024-01-30 NOTE — TELEPHONE ENCOUNTER
Prior Authorization Retail Medication Request    Medication/Dose: modafinil (PROVIGIL) 100 MG tablet  Diagnosis and ICD code (if different than what is on RX):    New/renewal/insurance change PA/secondary ins. PA:  Previously Tried and Failed: guanFACINE (INTUNIV) 1 MG TB24 24 hr tablet    Rationale: Patient has been taking this medication in the past with good results.      Insurance   Primary: [employer]   Insurance ID:  [id number]    Secondary (if applicable):  Insurance ID:      Pharmacy Information (if different than what is on RX)  Name:    Phone:    Fax:      David CORRALES

## 2024-02-11 ASSESSMENT — ASTHMA QUESTIONNAIRES
QUESTION_1 LAST FOUR WEEKS HOW MUCH OF THE TIME DID YOUR ASTHMA KEEP YOU FROM GETTING AS MUCH DONE AT WORK, SCHOOL OR AT HOME: NONE OF THE TIME
QUESTION_4 LAST FOUR WEEKS HOW OFTEN HAVE YOU USED YOUR RESCUE INHALER OR NEBULIZER MEDICATION (SUCH AS ALBUTEROL): ONCE A WEEK OR LESS
ACT_TOTALSCORE: 22
ACT_TOTALSCORE: 22
QUESTION_3 LAST FOUR WEEKS HOW OFTEN DID YOUR ASTHMA SYMPTOMS (WHEEZING, COUGHING, SHORTNESS OF BREATH, CHEST TIGHTNESS OR PAIN) WAKE YOU UP AT NIGHT OR EARLIER THAN USUAL IN THE MORNING: ONCE OR TWICE
QUESTION_2 LAST FOUR WEEKS HOW OFTEN HAVE YOU HAD SHORTNESS OF BREATH: ONCE OR TWICE A WEEK
QUESTION_5 LAST FOUR WEEKS HOW WOULD YOU RATE YOUR ASTHMA CONTROL: COMPLETELY CONTROLLED

## 2024-02-12 NOTE — TELEPHONE ENCOUNTER
Retail Pharmacy Prior Authorization Team   Phone: [phone]    PA Initiation    Medication: MODAFINIL 100 MG PO TABS  Insurance Company: [employer] - Phone [phone] Fax [phone]  Pharmacy Filling the Rx: [facility] - [place], [place] - [address] [place] [address]  Filling Pharmacy Phone: [phone]  Filling Pharmacy Fax:    Start Date: 2/12/2024

## 2024-02-13 ENCOUNTER — MYC MEDICAL ADVICE (OUTPATIENT)
Dept: FAMILY MEDICINE | Facility: CLINIC | Age: 44
End: 2024-02-13
Payer: COMMERCIAL

## 2024-02-14 ENCOUNTER — VIRTUAL VISIT (OUTPATIENT)
Dept: FAMILY MEDICINE | Facility: CLINIC | Age: 44
End: 2024-02-14
Payer: COMMERCIAL

## 2024-02-14 ENCOUNTER — TELEPHONE (OUTPATIENT)
Dept: FAMILY MEDICINE | Facility: CLINIC | Age: 44
End: 2024-02-14

## 2024-02-14 DIAGNOSIS — M12.9 ARTHROPATHY: Primary | ICD-10-CM

## 2024-02-14 DIAGNOSIS — N94.6 DYSMENORRHEA: ICD-10-CM

## 2024-02-14 DIAGNOSIS — M95.9 BONE DEFORMITY: Primary | ICD-10-CM

## 2024-02-14 DIAGNOSIS — S06.0X9A CONCUSSION WITH LOSS OF CONSCIOUSNESS, INITIAL ENCOUNTER: ICD-10-CM

## 2024-02-14 PROCEDURE — 99214 OFFICE O/P EST MOD 30 MIN: CPT | Mod: 95 | Performed by: NURSE PRACTITIONER

## 2024-02-14 RX ORDER — TRAMADOL HYDROCHLORIDE 50 MG/1
50 TABLET ORAL EVERY 6 HOURS PRN
Qty: 10 TABLET | Refills: 3 | Status: SHIPPED | OUTPATIENT
Start: 2024-02-14 | End: 2024-02-17

## 2024-02-14 RX ORDER — MODAFINIL 100 MG/1
TABLET ORAL
Qty: 90 TABLET | Refills: 1 | Status: SHIPPED | OUTPATIENT
Start: 2024-02-14 | End: 2024-08-21

## 2024-02-14 RX ORDER — PREDNISONE 10 MG/1
TABLET ORAL
Qty: 10 TABLET | Refills: 0 | Status: SHIPPED | OUTPATIENT
Start: 2024-02-14 | End: 2024-04-08

## 2024-02-14 NOTE — PROGRESS NOTES
"Anahy is a 43 year old who is being evaluated via a billable video visit.      How would you like to obtain your AVS? MyChart  If the video visit is dropped, the invitation should be resent by: Text to cell phone: [phone]  Will anyone else be joining your video visit? No          Assessment & Plan     Concussion with loss of consciousness, initial encounter  Refilled medication  - modafinil (PROVIGIL) 100 MG tablet  Dispense: 90 tablet; Refill: 1    Arthropathy  Will do burst dose x 2 if needed  - predniSONE (DELTASONE) 10 MG tablet  Dispense: 10 tablet; Refill: 0    Dysmenorrhea  Tramadol given to use PRN for severe menstural cramps  - traMADol (ULTRAM) 50 MG tablet  Dispense: 10 tablet; Refill: 3              BMI  Estimated body mass index is 28.96 kg/m  as calculated from the following:    Height as of 9/19/23: 1.651 m (5' 5\").    Weight as of 10/7/23: 78.9 kg (174 lb).       Subjective   Anahy is a 43 year old, presenting for the following health issues:  Recheck Medication        2/14/2024     8:23 AM   Additional Questions   Roomed by Theresa Watts CMA   Accompanied by Self     History of Present Illness       Reason for visit:  Follow up - having fibroid issues - no call for gene test - have a cold    She eats 4 or more servings of fruits and vegetables daily.She consumes 1 sweetened beverage(s) daily.She exercises with enough effort to increase her heart rate 9 or less minutes per day.  She exercises with enough effort to increase her heart rate 3 or less days per week.   She is taking medications regularly.         Medication Follow Up    Taking Medication as prescribed: yes  Side Effects:  Unknown  Medication Helping Symptoms:  Unknown    Has cold, taking cough syrup.     She has doing modafinil 100, sometimes she will take 200 mg and then she feels like it is to much.     Is taking one amantadine a day, is scared to discontinue fully.    Saw OB/GYN, she was having bad cramps and they were taking " "her breathe away, she was told she might have small fibroids and they are trying to get her to to have hysterectomy. She is worried due to family complications of other members that have almost \"\" due to hysterectomy. Has used tramadol in the past and would like to try again        Review of Systems  Constitutional, HEENT, cardiovascular, pulmonary, gi and gu systems are negative, except as otherwise noted.      Objective    Vitals - Patient Reported  Weight (Patient Reported): 69.4 kg (153 lb)  Height (Patient Reported): 165.1 cm (5' 5\")  BMI (Based on Pt Reported Ht/Wt): 25.46        Physical Exam   GENERAL: alert and no distress  EYES: Eyes grossly normal to inspection.  No discharge or erythema, or obvious scleral/conjunctival abnormalities.  RESP: No audible wheeze, cough, or visible cyanosis.    SKIN: Visible skin clear. No significant rash, abnormal pigmentation or lesions.  NEURO: Cranial nerves grossly intact.  Mentation and speech appropriate for age.  PSYCH: Appropriate affect, tone, and pace of words          Video-Visit Details    Type of service:  Video Visit     Originating Location (pt. Location): Home    Distant Location (provider location):  On-site  Platform used for Video Visit: Augusto  Signed Electronically by: Kelsie Garcia NP    "

## 2024-02-14 NOTE — TELEPHONE ENCOUNTER
Checking on referral for genetic testing. Harris please place new order for adult.    Iris Agrawal,

## 2024-02-14 NOTE — TELEPHONE ENCOUNTER
Formed signed and informed patient this morning at virtual visit  Kelsie Garcia NP on 2/14/2024 at 11:53 AM

## 2024-02-15 NOTE — TELEPHONE ENCOUNTER
PRIOR AUTHORIZATION DENIED    Medication: MODAFINIL 100 MG PO TABS  Insurance Company: [employer] - Phone [phone] Fax [phone]  Denial Date: 2/14/2024  Denial Reason(s):             Appeal Information:           Patient Notified: No

## 2024-02-26 ENCOUNTER — MYC MEDICAL ADVICE (OUTPATIENT)
Dept: FAMILY MEDICINE | Facility: CLINIC | Age: 44
End: 2024-02-26
Payer: COMMERCIAL

## 2024-02-26 ENCOUNTER — TELEPHONE (OUTPATIENT)
Dept: CONSULT | Facility: CLINIC | Age: 44
End: 2024-02-26
Payer: COMMERCIAL

## 2024-02-26 DIAGNOSIS — R10.9 FLANK PAIN: Primary | ICD-10-CM

## 2024-02-26 DIAGNOSIS — C73 PAPILLARY THYROID CARCINOMA (H): ICD-10-CM

## 2024-02-26 RX ORDER — LEVOTHYROXINE SODIUM 75 UG/1
75 TABLET ORAL DAILY
Qty: 90 TABLET | Refills: 1 | Status: SHIPPED | OUTPATIENT
Start: 2024-02-26 | End: 2024-09-24

## 2024-02-26 NOTE — TELEPHONE ENCOUNTER
Called Anahy to review recent referral to genetics. She was not available and a non-detailed VM with contact information was reviewed.    Additional information is needed to determine if we are able to evaluate Anahy in our genetics clinic. Referral was placed for a bone deformity. Referring provider was also contacted.    Shayna Lin MS [facility]  Genetic Counselor  Email: [email or web address]  Phone: [phone]  Pager: [phone]

## 2024-03-04 DIAGNOSIS — U09.9 LONG COVID: ICD-10-CM

## 2024-03-04 DIAGNOSIS — G93.32 CHRONIC FATIGUE SYNDROME: ICD-10-CM

## 2024-03-04 DIAGNOSIS — M79.7 FIBROMYALGIA: ICD-10-CM

## 2024-03-04 RX ORDER — PREDNISONE 5 MG/1
5 TABLET ORAL DAILY
Qty: 30 TABLET | Refills: 0 | Status: SHIPPED | OUTPATIENT
Start: 2024-03-04 | End: 2024-04-08

## 2024-04-01 ENCOUNTER — LAB (OUTPATIENT)
Dept: LAB | Facility: CLINIC | Age: 44
End: 2024-04-01
Payer: COMMERCIAL

## 2024-04-01 DIAGNOSIS — E89.0 HISTORY OF PARTIAL THYROIDECTOMY: ICD-10-CM

## 2024-04-01 DIAGNOSIS — G93.32 CHRONIC FATIGUE SYNDROME: ICD-10-CM

## 2024-04-01 DIAGNOSIS — U09.9 LONG COVID: ICD-10-CM

## 2024-04-01 DIAGNOSIS — M79.7 FIBROMYALGIA: ICD-10-CM

## 2024-04-01 DIAGNOSIS — D75.839 THROMBOCYTOSIS: ICD-10-CM

## 2024-04-01 DIAGNOSIS — E87.6 HYPOKALEMIA: ICD-10-CM

## 2024-04-01 LAB
ANION GAP SERPL CALCULATED.3IONS-SCNC: 9 MMOL/L (ref 7–15)
BUN SERPL-MCNC: 12.1 MG/DL (ref 6–20)
CALCIUM SERPL-MCNC: 8.5 MG/DL (ref 8.6–10)
CHLORIDE SERPL-SCNC: 104 MMOL/L (ref 98–107)
CREAT SERPL-MCNC: 0.85 MG/DL (ref 0.51–0.95)
DEPRECATED HCO3 PLAS-SCNC: 27 MMOL/L (ref 22–29)
EGFRCR SERPLBLD CKD-EPI 2021: 87 ML/MIN/1.73M2
ERYTHROCYTE [DISTWIDTH] IN BLOOD BY AUTOMATED COUNT: 13.4 % (ref 10–15)
GLUCOSE SERPL-MCNC: 79 MG/DL (ref 70–99)
HCT VFR BLD AUTO: 35.5 % (ref 35–47)
HGB BLD-MCNC: 12 G/DL (ref 11.7–15.7)
MCH RBC QN AUTO: 29.6 PG (ref 26.5–33)
MCHC RBC AUTO-ENTMCNC: 33.8 G/DL (ref 31.5–36.5)
MCV RBC AUTO: 88 FL (ref 78–100)
PLATELET # BLD AUTO: 378 10E3/UL (ref 150–450)
POTASSIUM SERPL-SCNC: 3.5 MMOL/L (ref 3.4–5.3)
RBC # BLD AUTO: 4.05 10E6/UL (ref 3.8–5.2)
SODIUM SERPL-SCNC: 140 MMOL/L (ref 135–145)
TSH SERPL DL<=0.005 MIU/L-ACNC: 0.95 UIU/ML (ref 0.3–4.2)
WBC # BLD AUTO: 6.2 10E3/UL (ref 4–11)

## 2024-04-01 PROCEDURE — 84443 ASSAY THYROID STIM HORMONE: CPT

## 2024-04-01 PROCEDURE — 85027 COMPLETE CBC AUTOMATED: CPT

## 2024-04-01 PROCEDURE — 36415 COLL VENOUS BLD VENIPUNCTURE: CPT

## 2024-04-01 PROCEDURE — 80048 BASIC METABOLIC PNL TOTAL CA: CPT

## 2024-04-02 RX ORDER — PREDNISONE 5 MG/1
5 TABLET ORAL DAILY
Qty: 30 TABLET | Refills: 0 | OUTPATIENT
Start: 2024-04-02

## 2024-04-08 ENCOUNTER — VIRTUAL VISIT (OUTPATIENT)
Dept: FAMILY MEDICINE | Facility: CLINIC | Age: 44
End: 2024-04-08
Payer: COMMERCIAL

## 2024-04-08 DIAGNOSIS — M12.9 ARTHROPATHY: ICD-10-CM

## 2024-04-08 DIAGNOSIS — G93.32 CHRONIC FATIGUE SYNDROME: ICD-10-CM

## 2024-04-08 DIAGNOSIS — U09.9 LONG COVID: ICD-10-CM

## 2024-04-08 DIAGNOSIS — M79.7 FIBROMYALGIA: ICD-10-CM

## 2024-04-08 PROCEDURE — 99215 OFFICE O/P EST HI 40 MIN: CPT | Mod: 95 | Performed by: NURSE PRACTITIONER

## 2024-04-08 RX ORDER — PREDNISONE 10 MG/1
TABLET ORAL
Qty: 10 TABLET | Refills: 0 | Status: SHIPPED | OUTPATIENT
Start: 2024-04-08

## 2024-04-08 RX ORDER — PREDNISONE 5 MG/1
5 TABLET ORAL DAILY
Qty: 30 TABLET | Refills: 1 | Status: SHIPPED | OUTPATIENT
Start: 2024-04-08 | End: 2024-06-17

## 2024-04-08 NOTE — PROGRESS NOTES
"Anahy is a 43 year old who is being evaluated via a billable video visit.    How would you like to obtain your AVS? Bogdanhart  If the video visit is dropped, the invitation should be resent by: Text to cell phone: [phone]  Will anyone else be joining your video visit? No      Assessment & Plan     Arthropathy  I did provide her with refills or prednisone until she can establish with new PCP.  She understands there are risks with long term steroids.  She generally uses during high stress periods, she is an  and tax season has been stressful.  I have seen this patient in the past and I would continue to recommend she follow with pain specialist for medication management, especially if EDS is confirmed.  There are also pain specific physical and occupational therapists she can be referred to through this specialty.  She is somewhat resistant to this as she has had poor experiences in the past with pain clinics.  She has had extensive work up without confirmed diagnosis and this complex situation is outside of my area of expertise.  Rheumatology will not see her without a confirmed diagnosis of RA or other autoimmune disease.  Also continue with Long-COVID clinic.    - predniSONE (DELTASONE) 10 MG tablet; Take 10 mg a day as needed for flare x 5 days and then go back to 5 mg, may repeat one time.    Long COVID  - predniSONE (DELTASONE) 5 MG tablet; Take 1 tablet (5 mg) by mouth daily    Chronic fatigue syndrome  - predniSONE (DELTASONE) 5 MG tablet; Take 1 tablet (5 mg) by mouth daily    Fibromyalgia  - predniSONE (DELTASONE) 5 MG tablet; Take 1 tablet (5 mg) by mouth daily    Review of external notes as documented elsewhere in note  40 minutes spent by me on the date of the encounter doing chart review, history and exam, documentation and further activities per the note      BMI  Estimated body mass index is 28.96 kg/m  as calculated from the following:    Height as of 9/19/23: 1.651 m (5' 5\").    Weight " as of 10/7/23: 78.9 kg (174 lb).             Subjective   Anahy is a 43 year old, presenting for the following health issues:  Recheck Medication    History of Present Illness       Reason for visit:  Need to discuss a plan as Harris left [place] and middle of stressful time with tax season and had plan and am getting better slowly    She eats 4 or more servings of fruits and vegetables daily.She consumes 0 sweetened beverage(s) daily.She exercises with enough effort to increase her heart rate 9 or less minutes per day.  She exercises with enough effort to increase her heart rate 3 or less days per week.   She is taking medications regularly.       Doing much better than when she saw me last.  Continued with the COVID doctor, working with an NP that had adjusted a lot of meds.  Managing stress.   Some times are more challenging than others.    Will be doing genetic testing after Tax season.  They wonder if there is some Neal' danlos in addition to long-COVID  Previous provider left [place] unexpectedly.  Next 2 months during tax season is difficult.      Very frustrated she has to start all over with new PCP.    Doing 5mg prednisone daily through old PCP.  Also had 10mg available that could be used for 5 days if needed.  Quality of life is the driving factor.     Rheumatology wont see her because she does not have diagnosed RA.      Quality of life is poor.  Single mom,     Tried Amantadine, made her anxiety very bad.  Guanfacine made her depressed.    Working with endocrinology for thyroid.      Its not so much a pain issue.  Movement issue.    Outside blood work shows weird markers, but still can't get seen by rheumatology.  Does have a referral through a friend to a rheumatologist in [place].     Poor experience with pain clinic, who hasn't managed steroids.      Daughter ended up with long COVID too, feels she meets criteria for EDS also.  Mom has POTS.      Has been to [facility].      Previous PCP had standing  labs.  Doing IV saline infusions, sometimes with vitamins.                  Objective           Vitals:  No vitals were obtained today due to virtual visit.    Physical Exam   GENERAL: alert and no distress  EYES: Eyes grossly normal to inspection.  No discharge or erythema, or obvious scleral/conjunctival abnormalities.  RESP: No audible wheeze, cough, or visible cyanosis.    SKIN: Visible skin clear. No significant rash, abnormal pigmentation or lesions.  NEURO: Cranial nerves grossly intact.  Mentation and speech appropriate for age.  PSYCH: Appropriate affect, tone, and pace of words          Video-Visit Details    Type of service:  Video Visit   Originating Location (pt. Location): Home    Distant Location (provider location):  Off-site  Platform used for Video Visit: Augusto  Signed Electronically by: SASHA Arroyo CNP

## 2024-04-25 SDOH — SOCIAL STABILITY: SOCIAL NETWORK: I HAVE TROUBLE DOING ALL OF MY REGULAR LEISURE ACTIVITIES WITH OTHERS: SOMETIMES

## 2024-04-25 SDOH — SOCIAL STABILITY: SOCIAL NETWORK: I HAVE TROUBLE DOING ALL OF MY USUAL WORK (INCLUDE WORK AT HOME): SOMETIMES

## 2024-04-25 SDOH — SOCIAL STABILITY: SOCIAL NETWORK

## 2024-04-25 SDOH — SOCIAL STABILITY: SOCIAL NETWORK: I HAVE TROUBLE DOING ALL OF THE ACTIVITIES WITH FRIENDS THAT I WANT TO DO: RARELY

## 2024-04-25 SDOH — SOCIAL STABILITY: SOCIAL NETWORK: PROMIS ABILITY TO PARTICIPATE IN SOCIAL ROLES & ACTIVITIES T-SCORE: 46

## 2024-04-25 SDOH — SOCIAL STABILITY: SOCIAL NETWORK: I HAVE TROUBLE DOING ALL OF THE FAMILY ACTIVITIES THAT I WANT TO DO: SOMETIMES

## 2024-04-25 ASSESSMENT — ANXIETY QUESTIONNAIRES
GAD7 TOTAL SCORE: 2
3. WORRYING TOO MUCH ABOUT DIFFERENT THINGS: NOT AT ALL
6. BECOMING EASILY ANNOYED OR IRRITABLE: SEVERAL DAYS
5. BEING SO RESTLESS THAT IT IS HARD TO SIT STILL: NOT AT ALL
8. IF YOU CHECKED OFF ANY PROBLEMS, HOW DIFFICULT HAVE THESE MADE IT FOR YOU TO DO YOUR WORK, TAKE CARE OF THINGS AT HOME, OR GET ALONG WITH OTHER PEOPLE?: NOT DIFFICULT AT ALL
GAD7 TOTAL SCORE: 2
7. FEELING AFRAID AS IF SOMETHING AWFUL MIGHT HAPPEN: NOT AT ALL
2. NOT BEING ABLE TO STOP OR CONTROL WORRYING: NOT AT ALL
1. FEELING NERVOUS, ANXIOUS, OR ON EDGE: SEVERAL DAYS
7. FEELING AFRAID AS IF SOMETHING AWFUL MIGHT HAPPEN: NOT AT ALL
IF YOU CHECKED OFF ANY PROBLEMS ON THIS QUESTIONNAIRE, HOW DIFFICULT HAVE THESE PROBLEMS MADE IT FOR YOU TO DO YOUR WORK, TAKE CARE OF THINGS AT HOME, OR GET ALONG WITH OTHER PEOPLE: NOT DIFFICULT AT ALL
GAD7 TOTAL SCORE: 2
4. TROUBLE RELAXING: NOT AT ALL

## 2024-04-25 ASSESSMENT — PATIENT HEALTH QUESTIONNAIRE - PHQ9
10. IF YOU CHECKED OFF ANY PROBLEMS, HOW DIFFICULT HAVE THESE PROBLEMS MADE IT FOR YOU TO DO YOUR WORK, TAKE CARE OF THINGS AT HOME, OR GET ALONG WITH OTHER PEOPLE: NOT DIFFICULT AT ALL
SUM OF ALL RESPONSES TO PHQ QUESTIONS 1-9: 3
SUM OF ALL RESPONSES TO PHQ QUESTIONS 1-9: 3

## 2024-05-01 ENCOUNTER — VIRTUAL VISIT (OUTPATIENT)
Dept: PHYSICAL MEDICINE AND REHAB | Facility: CLINIC | Age: 44
End: 2024-05-01
Payer: COMMERCIAL

## 2024-05-01 DIAGNOSIS — U09.9 LONG COVID: Primary | ICD-10-CM

## 2024-05-01 PROCEDURE — 99214 OFFICE O/P EST MOD 30 MIN: CPT | Mod: 95 | Performed by: PHYSICAL MEDICINE & REHABILITATION

## 2024-05-01 ASSESSMENT — PAIN SCALES - GENERAL: PAINLEVEL: MODERATE PAIN (4)

## 2024-05-01 NOTE — NURSING NOTE
Is the patient currently in the state of [place]? YES    Visit mode:VIDEO    If the visit is dropped, the patient can be reconnected by: VIDEO VISIT: Send to e-mail at: [email or web address]    Will anyone else be joining the visit? NO  (If patient encounters technical issues they should call [phone]    How would you like to obtain your AVS? MyChart    Are changes needed to the allergy or medication list? No    Are refills needed on medications prescribed by this physician? NO    Reason for visit: Follow Up    Emerita FLORENTINO

## 2024-05-01 NOTE — LETTER
5/1/2024       RE: Anahy Urena  [address] [address]       Dear Colleague,    Thank you for referring your patient, Anahy Urena, to the [facility] PHYSICAL [facility]ICINE AND REHABILITATION CLINIC [place] at [facility]. Please see a copy of my visit note below.       Assessment and plan   Anahy Urena is a 43 year old female with history of thyroid tumor, and concussion with LOC who presents following an acute COVID infection in January 2022 leading to long COVID syndrome.  Her symptoms are mostly in the area of fatigue and brain fog.    She is amidst significant stress from work, with the tax season and politics which are impacting all her function.     She is off the amantadine and guanfacine.   Continue the modafinil.   Continue the steroids at 5 mg now.     LDN did not help.     Her objective findings include increased levels of ESR and CRP levels which are being closely monitored. These levels are now normal while she is on steroids.      She has trialed OT/PT and both were unhelpful.        RTC in 3 months with Dr Onelia Gramajo.     Maryann Cao MD, [facility]   Department of Rehabilitation     Current Symptoms:  The change in weather to cold and wet was not helpful. Work stress is there.   Good days and bad days.   She describes the fatigue has been difficult, the work environment has been difficult as it was the tax season.   She is completely off the guanfacine, she has depressive thoughts. She is off the amantadine as it was causing anxiety. Amantadine was very helpful for the brain fog. She is making changes to reduce the stress. She thinks that the brain fog is worse. She is on modafinil 100 mg and her PCP increased the dose to 200 mg but she prefers the 100 mg a day. Weaning off the modafinil was not possible.       She had been referred to rheumatology, and did not test positive. She is in the process of scheduling of genetic  testing, DNA SEQUENCING test. Her mother has a bone deformity. She thinks that she might have Neal Danlos syndrome.     Reviewing the scores, she has all over body aches and she has increased pain scores in the PROMIS scores. Her PHQ9/GAD7 are intact.     She is taking ibuprofen prn for pain. She also takes benadryl.  These are however, not very helpful.           5/1/2024     9:19 AM   PHQ Assesment Total Score(s)   PHQ-2 Score 0         4/25/2024    12:39 PM   YONY-7 Results   YONY 7 TOTAL SCORE 2 (minimal anxiety)   YONY-7 Total Score 2         4/25/2024    12:39 PM   PTSD Screen Score   Have you ever experienced this kind of event? Yes   PTSD Screen (Score of 3 or more suggests positive screen) 0         4/25/2024    12:40 PM   PROMIS-29   PROMIS Physical Function T-Score 36 (moderate dysfunction)   PROMIS Anxiety T-Score 51 (within normal limits)   PROMIS Depression T-Score 41 (within normal limits)   PROMIS Fatigue T-Score 53 (within normal limits)   PROMIS Sleep Disturbance T-Score 56 (mild)   PROMIS Ability to Participate in Social Roles & Activities T-Score 46 (within normal limits)   PROMIS Pain Interference T-Score 61 (moderate)   PROMIS Pain Intensity 4         Past Medical History:   Diagnosis Date    Abnormal uterine bleeding 2020    Acne vulgaris     Anemia 1988    Anxiety     Arthritis 1/15/2022    RA is negative    Asthma     Concussion 05/2007    Eczema 1993    Endometriosis     GERD (gastroesophageal reflux disease)     H/O partial thyroidectomy     HTN (hypertension)     Hypertension 5/15/2022    Infection due to 2019 novel coronavirus 01/04/2022    Long COVID     Major depression, single episode 02/07/2023    papillary thyroid carcinoma 08/16/2022    left 0.4 cm    PCOS (polycystic ovarian syndrome)     Pink eye disease of both eyes 10/24/2018    Rosacea     Thrombocytosis        Past Surgical History:   Procedure Laterality Date    ABDOMEN SURGERY  2001, 2020    laparoscopy on ovaries    ANKLE  SURGERY      BIOPSY  neck 1999    BRACHIAL PLEXUS EXPLORATION      ENT SURGERY  1999    neck tumor    exploratory laparscopy  2001    endometriosis    EYE SURGERY  2015    GYN SURGERY      HEAD & NECK SURGERY  1999    LAPAROSCOPIC ENDOMETRIOSIS FULGURATION      LAPAROSCOPIC SALPINGECTOMY N/A 07/20/2023    Procedure: LAPAROSCOPY WITH LEFT SALPINGO OOPHORECTOMY RIGHT SALPINGECTOMY,HYSTEROSCOPY, WITH DILATION AND CURETTAGE, ENDOMETRIAL ABLATION, REPAIR OF CERVICAL LACERATION;  Surgeon: Jossy Rodriguez MD;  Location: [facility]    ORTHOPEDIC SURGERY  2019, 2022    NH HYSTEROSCOPY,W/ENDO BX N/A 11/30/2020    Procedure: HYSTEROSCOPY WITH DILATION AND CURETTAGE, CERVICAL POLYPECTOMY AND PLACEMENT OF MIRENA INTRAUTERINE DEVICE;  Surgeon: Jossy Rodriguez MD;  Location: [facility];  Service: Gynecology    TEAR DUCT SURGERY  1983         Social History     Tobacco Use    Smoking status: Never    Smokeless tobacco: Never   Vaping Use    Vaping status: Never Used   Substance Use Topics    Alcohol use: Yes     Comment: Socially    Drug use: Never         Current Outpatient Medications:     albuterol (PROAIR HFA;PROVENTIL HFA;VENTOLIN HFA) 90 mcg/actuation inhaler, Inhale 2 puffs into the lungs every 6 hours as needed , Disp: , Rfl:     amantadine (SYMMETREL) 100 MG capsule, Take 1 capsule (100 mg) by mouth 2 times daily, Disp: 180 capsule, Rfl: 3    clobetasol propionate (CLOBEX) 0.05 % external shampoo, 02/22/2022 Clobetasol Shampoo 0.05 %      02/22/2022  active, Disp: , Rfl:     eletriptan (RELPAX) 20 MG tablet, Take 1-2 tablets (20-40 mg) by mouth at onset of headache for migraine May repeat in 2 hours. Max 4 tablets/24 hours., Disp: 30 tablet, Rfl: 3    furosemide (LASIX) 20 MG tablet, Take 2 tablets in the AM and one tablet in the PM, Disp: 270 tablet, Rfl: 3    levothyroxine (SYNTHROID/LEVOTHROID) 75 MCG tablet, Take 1 tablet (75 mcg) by mouth daily, Disp: 90 tablet, Rfl: 1     loratadine-pseudoePHEDrine (CLARITIN-D 24 HOUR)  MG 24 hr tablet, Take 1 tablet by mouth daily, Disp: 90 tablet, Rfl: 3    LORazepam (ATIVAN) 1 MG tablet, Take 1 tablet (1 mg) by mouth 3 times daily as needed for anxiety or sleep, Disp: 90 tablet, Rfl: 5    Loteprednol Etabonate (LOTEMAX SM) 0.38 % GEL, Apply 1 drop to eye, Disp: , Rfl:     modafinil (PROVIGIL) 100 MG tablet, Take one tablet by mouth once a day, may take additional tablet daily if needed for brain fog/fatigue, Disp: 90 tablet, Rfl: 1    ondansetron (ZOFRAN ODT) 4 MG ODT tab, Take 1 tablet (4 mg) by mouth every 8 hours as needed for nausea, Disp: 10 tablet, Rfl: 0    pantoprazole (PROTONIX) 40 MG EC tablet, Take 1 tablet (40 mg) by mouth 2 times daily Further refills per PCP, Disp: 180 tablet, Rfl: 3    potassium chloride ER (K-TAB) 20 MEQ CR tablet, Take 1 tablet (20 mEq) by mouth 2 times daily, Disp: 180 tablet, Rfl: 3    predniSONE (DELTASONE) 10 MG tablet, Take 10 mg a day as needed for flare x 5 days and then go back to 5 mg, may repeat one time., Disp: 10 tablet, Rfl: 0    predniSONE (DELTASONE) 5 MG tablet, Take 1 tablet (5 mg) by mouth daily, Disp: 30 tablet, Rfl: 1     Latest Reference Range & Units 04/01/24 09:22   Sodium 135 - 145 mmol/L 140   Potassium 3.4 - 5.3 mmol/L 3.5   Chloride 98 - 107 mmol/L 104   Carbon Dioxide (CO2) 22 - 29 mmol/L 27   Urea Nitrogen 6.0 - 20.0 mg/dL 12.1   Creatinine 0.51 - 0.95 mg/dL 0.85   GFR Estimate >60 mL/min/1.73m2 87   Calcium 8.6 - 10.0 mg/dL 8.5 (L)   Anion Gap 7 - 15 mmol/L 9   Glucose 70 - 99 mg/dL 79   TSH 0.30 - 4.20 uIU/mL 0.95   WBC 4.0 - 11.0 10e3/uL 6.2   Hemoglobin 11.7 - 15.7 g/dL 12.0   Hematocrit 35.0 - 47.0 % 35.5   Platelet Count 150 - 450 10e3/uL 378   RBC Count 3.80 - 5.20 10e6/uL 4.05   MCV 78 - 100 fL 88   MCH 26.5 - 33.0 pg 29.6   MCHC 31.5 - 36.5 g/dL 33.8   RDW 10.0 - 15.0 % 13.4   (L): Data is abnormally low      Again, thank you for allowing me to participate in the care of  your patient.      Sincerely,    Maryann Cao MD

## 2024-05-01 NOTE — PROGRESS NOTES
Virtual Visit Details    Type of service:  Video Visit     Originating Location (pt. Location): Home    Distant Location (provider location):  On-site  Platform used for Video Visit: [facility]        Assessment and plan   Anahy Urena is a 43 year old female with history of thyroid tumor, and concussion with LOC who presents following an acute COVID infection in January 2022 leading to long COVID syndrome.  Her symptoms are mostly in the area of fatigue and brain fog.    She is amidst significant stress from work, with the tax season and politics which are impacting all her function.     She is off the amantadine and guanfacine.   Continue the modafinil.   Continue the steroids at 5 mg now.     LDN did not help.     Her objective findings include increased levels of ESR and CRP levels which are being closely monitored. These levels are now normal while she is on steroids.      She has trialed OT/PT and both were unhelpful.        RTC in 3 months with Dr Onelia Gramajo.     Maryann Cao MD, [facility]   Department of Rehabilitation     Current Symptoms:  The change in weather to cold and wet was not helpful. Work stress is there.   Good days and bad days.   She describes the fatigue has been difficult, the work environment has been difficult as it was the tax season.   She is completely off the guanfacine, she has depressive thoughts. She is off the amantadine as it was causing anxiety. Amantadine was very helpful for the brain fog. She is making changes to reduce the stress. She thinks that the brain fog is worse. She is on modafinil 100 mg and her PCP increased the dose to 200 mg but she prefers the 100 mg a day. Weaning off the modafinil was not possible.       She had been referred to rheumatology, and did not test positive. She is in the process of scheduling of genetic testing, DNA SEQUENCING test. Her mother has a bone deformity. She thinks that she might have Neal Danlos syndrome.     Reviewing the scores, she  has all over body aches and she has increased pain scores in the PROMIS scores. Her PHQ9/GAD7 are intact.     She is taking ibuprofen prn for pain. She also takes benadryl.  These are however, not very helpful.           5/1/2024     9:19 AM   PHQ Assesment Total Score(s)   PHQ-2 Score 0         4/25/2024    12:39 PM   YONY-7 Results   YONY 7 TOTAL SCORE 2 (minimal anxiety)   YONY-7 Total Score 2         4/25/2024    12:39 PM   PTSD Screen Score   Have you ever experienced this kind of event? Yes   PTSD Screen (Score of 3 or more suggests positive screen) 0         4/25/2024    12:40 PM   PROMIS-29   PROMIS Physical Function T-Score 36 (moderate dysfunction)   PROMIS Anxiety T-Score 51 (within normal limits)   PROMIS Depression T-Score 41 (within normal limits)   PROMIS Fatigue T-Score 53 (within normal limits)   PROMIS Sleep Disturbance T-Score 56 (mild)   PROMIS Ability to Participate in Social Roles & Activities T-Score 46 (within normal limits)   PROMIS Pain Interference T-Score 61 (moderate)   PROMIS Pain Intensity 4         Past Medical History:   Diagnosis Date     Abnormal uterine bleeding 2020     Acne vulgaris      Anemia 1988     Anxiety      Arthritis 1/15/2022    RA is negative     Asthma      Concussion 05/2007     Eczema 1993     Endometriosis      GERD (gastroesophageal reflux disease)      H/O partial thyroidectomy      HTN (hypertension)      Hypertension 5/15/2022     Infection due to 2019 novel coronavirus 01/04/2022     Long COVID      Major depression, single episode 02/07/2023     papillary thyroid carcinoma 08/16/2022    left 0.4 cm     PCOS (polycystic ovarian syndrome)      Pink eye disease of both eyes 10/24/2018     Rosacea      Thrombocytosis        Past Surgical History:   Procedure Laterality Date     ABDOMEN SURGERY  2001, 2020    laparoscopy on ovaries     ANKLE SURGERY       BIOPSY  neck 1999     BRACHIAL PLEXUS EXPLORATION       ENT SURGERY  1999    neck tumor     exploratory  laparscopy  2001    endometriosis     EYE SURGERY  2015     GYN SURGERY       HEAD & NECK SURGERY  1999     LAPAROSCOPIC ENDOMETRIOSIS FULGURATION       LAPAROSCOPIC SALPINGECTOMY N/A 07/20/2023    Procedure: LAPAROSCOPY WITH LEFT SALPINGO OOPHORECTOMY RIGHT SALPINGECTOMY,HYSTEROSCOPY, WITH DILATION AND CURETTAGE, ENDOMETRIAL ABLATION, REPAIR OF CERVICAL LACERATION;  Surgeon: Jossy Rodriguez MD;  Location: [facility]     ORTHOPEDIC SURGERY  2019, 2022     HI HYSTEROSCOPY,W/ENDO BX N/A 11/30/2020    Procedure: HYSTEROSCOPY WITH DILATION AND CURETTAGE, CERVICAL POLYPECTOMY AND PLACEMENT OF MIRENA INTRAUTERINE DEVICE;  Surgeon: Jossy Rodriguez MD;  Location: [facility];  Service: Gynecology     TEAR DUCT SURGERY  1983         Social History     Tobacco Use     Smoking status: Never     Smokeless tobacco: Never   Vaping Use     Vaping status: Never Used   Substance Use Topics     Alcohol use: Yes     Comment: Socially     Drug use: Never         Current Outpatient Medications:      albuterol (PROAIR HFA;PROVENTIL HFA;VENTOLIN HFA) 90 mcg/actuation inhaler, Inhale 2 puffs into the lungs every 6 hours as needed , Disp: , Rfl:      amantadine (SYMMETREL) 100 MG capsule, Take 1 capsule (100 mg) by mouth 2 times daily, Disp: 180 capsule, Rfl: 3     clobetasol propionate (CLOBEX) 0.05 % external shampoo, 02/22/2022 Clobetasol Shampoo 0.05 %      02/22/2022  active, Disp: , Rfl:      eletriptan (RELPAX) 20 MG tablet, Take 1-2 tablets (20-40 mg) by mouth at onset of headache for migraine May repeat in 2 hours. Max 4 tablets/24 hours., Disp: 30 tablet, Rfl: 3     furosemide (LASIX) 20 MG tablet, Take 2 tablets in the AM and one tablet in the PM, Disp: 270 tablet, Rfl: 3     levothyroxine (SYNTHROID/LEVOTHROID) 75 MCG tablet, Take 1 tablet (75 mcg) by mouth daily, Disp: 90 tablet, Rfl: 1     loratadine-pseudoePHEDrine (CLARITIN-D 24 HOUR)  MG 24 hr tablet, Take 1 tablet by mouth daily, Disp: 90  tablet, Rfl: 3     LORazepam (ATIVAN) 1 MG tablet, Take 1 tablet (1 mg) by mouth 3 times daily as needed for anxiety or sleep, Disp: 90 tablet, Rfl: 5     Loteprednol Etabonate (LOTEMAX SM) 0.38 % GEL, Apply 1 drop to eye, Disp: , Rfl:      modafinil (PROVIGIL) 100 MG tablet, Take one tablet by mouth once a day, may take additional tablet daily if needed for brain fog/fatigue, Disp: 90 tablet, Rfl: 1     ondansetron (ZOFRAN ODT) 4 MG ODT tab, Take 1 tablet (4 mg) by mouth every 8 hours as needed for nausea, Disp: 10 tablet, Rfl: 0     pantoprazole (PROTONIX) 40 MG EC tablet, Take 1 tablet (40 mg) by mouth 2 times daily Further refills per PCP, Disp: 180 tablet, Rfl: 3     potassium chloride ER (K-TAB) 20 MEQ CR tablet, Take 1 tablet (20 mEq) by mouth 2 times daily, Disp: 180 tablet, Rfl: 3     predniSONE (DELTASONE) 10 MG tablet, Take 10 mg a day as needed for flare x 5 days and then go back to 5 mg, may repeat one time., Disp: 10 tablet, Rfl: 0     predniSONE (DELTASONE) 5 MG tablet, Take 1 tablet (5 mg) by mouth daily, Disp: 30 tablet, Rfl: 1     Latest Reference Range & Units 04/01/24 09:22   Sodium 135 - 145 mmol/L 140   Potassium 3.4 - 5.3 mmol/L 3.5   Chloride 98 - 107 mmol/L 104   Carbon Dioxide (CO2) 22 - 29 mmol/L 27   Urea Nitrogen 6.0 - 20.0 mg/dL 12.1   Creatinine 0.51 - 0.95 mg/dL 0.85   GFR Estimate >60 mL/min/1.73m2 87   Calcium 8.6 - 10.0 mg/dL 8.5 (L)   Anion Gap 7 - 15 mmol/L 9   Glucose 70 - 99 mg/dL 79   TSH 0.30 - 4.20 uIU/mL 0.95   WBC 4.0 - 11.0 10e3/uL 6.2   Hemoglobin 11.7 - 15.7 g/dL 12.0   Hematocrit 35.0 - 47.0 % 35.5   Platelet Count 150 - 450 10e3/uL 378   RBC Count 3.80 - 5.20 10e6/uL 4.05   MCV 78 - 100 fL 88   MCH 26.5 - 33.0 pg 29.6   MCHC 31.5 - 36.5 g/dL 33.8   RDW 10.0 - 15.0 % 13.4   (L): Data is abnormally low

## 2024-05-14 ENCOUNTER — MYC MEDICAL ADVICE (OUTPATIENT)
Dept: FAMILY MEDICINE | Facility: CLINIC | Age: 44
End: 2024-05-14
Payer: COMMERCIAL

## 2024-05-14 DIAGNOSIS — I10 PRIMARY HYPERTENSION: ICD-10-CM

## 2024-05-15 NOTE — TELEPHONE ENCOUNTER
Michelle - see patient Bogdanhart. Would you be in agreement to fill a bridge of lasix? Pended below for review. Asked that patient schedule a follow up visit to discuss other updates mentioned.    DILEEP ChildsN, RN (she/her)  [facility] RN

## 2024-05-16 RX ORDER — FUROSEMIDE 20 MG
TABLET ORAL
Qty: 270 TABLET | Refills: 0 | Status: SHIPPED | OUTPATIENT
Start: 2024-05-16

## 2024-05-28 ENCOUNTER — MYC MEDICAL ADVICE (OUTPATIENT)
Dept: FAMILY MEDICINE | Facility: CLINIC | Age: 44
End: 2024-05-28
Payer: COMMERCIAL

## 2024-05-30 ENCOUNTER — VIRTUAL VISIT (OUTPATIENT)
Dept: FAMILY MEDICINE | Facility: CLINIC | Age: 44
End: 2024-05-30
Payer: COMMERCIAL

## 2024-05-30 DIAGNOSIS — G93.32 CHRONIC FATIGUE SYNDROME: ICD-10-CM

## 2024-05-30 DIAGNOSIS — M79.10 MYALGIA: Primary | ICD-10-CM

## 2024-05-30 DIAGNOSIS — U09.9 LONG COVID: ICD-10-CM

## 2024-05-30 PROCEDURE — 99214 OFFICE O/P EST MOD 30 MIN: CPT | Mod: 95 | Performed by: NURSE PRACTITIONER

## 2024-05-30 NOTE — PROGRESS NOTES
Anahy is a 44 year old who is being evaluated via a billable video visit.    How would you like to obtain your AVS? MyChart  If the video visit is dropped, the invitation should be resent by: Text to cell phone: [phone]  Will anyone else be joining your video visit? No      Assessment & Plan     Myalgia  Chronic fatigue syndrome  Long COVID  She has already been evaluated by Rheumatology who did not suspect rheumatologic disorder.  She had genetic testing which showed possible autoimmune issue, however, this would not be an indication for RA type medication.  I will recheck labs, but ultimately, [facility].  In general, it sounds like her condition is slowly improving with current plan of care.  We should follow up for preventative when her insurance issues are figured out.  - Anti Nuclear Jasmina IgG by IFA with Reflex; Future  - Rheumatoid factor; Future  - ESR: Erythrocyte sedimentation rate; Future  - CRP, inflammation; Future        Subjective   Anahy is a 44 year old, presenting for the following health issues:  No chief complaint on file.    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  Some stress, was going to have 90 day period, now being told 20 days to withdraw from the firm.  Job was a lot of stress, did not get along with the directing partner who she felt was toxic.  Seeing counselor twice daily, walking with mom daily.     Waiting on insurance.      [facility] clinic, doing well. Overall feeling better.  COVID doctor is wondering if there is any autoimmune components going on.  Did a gene sequencing which confirms some of the things she suspected.    Has been a year or 2 since she did testing for Rheumatologic conditions.    Daughter is having similar symptoms and she wonders if there is a treatment that might help her.             Objective           Vitals:  No vitals were obtained today due to virtual  visit.    Physical Exam   GENERAL: alert and no distress  EYES: Eyes grossly normal to inspection.  No discharge or erythema, or obvious scleral/conjunctival abnormalities.  RESP: No audible wheeze, cough, or visible cyanosis.    SKIN: Visible skin clear. No significant rash, abnormal pigmentation or lesions.  NEURO: Cranial nerves grossly intact.  Mentation and speech appropriate for age.  PSYCH: Appropriate affect, tone, and pace of words          Video-Visit Details    Type of service:  Video Visit   Originating Location (pt. Location): Home    Distant Location (provider location):  On-site  Platform used for Video Visit: [employer]  Signed Electronically by: SASHA Arroyo CNP

## 2024-06-10 ENCOUNTER — MYC MEDICAL ADVICE (OUTPATIENT)
Dept: FAMILY MEDICINE | Facility: CLINIC | Age: 44
End: 2024-06-10
Payer: COMMERCIAL

## 2024-06-11 NOTE — TELEPHONE ENCOUNTER
This is non urgent for her physical, no need for a same day hold.  Fine to wait until insurance is figured out.

## 2024-06-11 NOTE — TELEPHONE ENCOUNTER
"Michelle -- Is patient ok to wait? Offer a hold slot?    \"I am trying to schedule something for the week of the 23rd of June for in person visit as we discussed and my daughter will be at [facility] for gymnastics 9-5  but you are scheduled out into July at this time and I dont have my July insurance set up, so need to hold on anything past June 30th.     Just wanted to let you know what the current updates are, and I will keep checking for cancellations.\"    Sonja Funes RN  [facility]    "

## 2024-06-12 DIAGNOSIS — M79.7 FIBROMYALGIA: ICD-10-CM

## 2024-06-12 DIAGNOSIS — G93.32 CHRONIC FATIGUE SYNDROME: ICD-10-CM

## 2024-06-12 DIAGNOSIS — U09.9 LONG COVID: ICD-10-CM

## 2024-06-17 RX ORDER — PREDNISONE 5 MG/1
5 TABLET ORAL DAILY
Qty: 30 TABLET | Refills: 1 | Status: SHIPPED | OUTPATIENT
Start: 2024-06-17 | End: 2024-08-21

## 2024-06-18 NOTE — TELEPHONE ENCOUNTER
Michelle--    This is in regards to the 6/12 refill encounter where you got the prednisone request and said you would defer to [facility], which they did fill for her    Pt was under the impression that you would be taking over script    But she now has refills anyway which I informed her    DILEEP BanerjeeN RN  [facility]

## 2024-06-18 NOTE — TELEPHONE ENCOUNTER
As a Chart FYI, I do not manage chronic steroid treatment and ultimately would like to see her come off of this.  I have had multiple conversations that I am not an expert in treating this illness and in regards to her steroids.  I have given her refills in a pinch, but do not want long term ownership of this medication.  If the [facility] clinic feels it is appropriate for her to continue this indefinitely, then I will defer to them for management.

## 2024-07-22 ENCOUNTER — VIRTUAL VISIT (OUTPATIENT)
Dept: FAMILY MEDICINE | Facility: CLINIC | Age: 44
End: 2024-07-22
Payer: COMMERCIAL

## 2024-07-22 ENCOUNTER — LAB (OUTPATIENT)
Dept: LAB | Facility: CLINIC | Age: 44
End: 2024-07-22
Payer: COMMERCIAL

## 2024-07-22 DIAGNOSIS — E87.6 HYPOKALEMIA: ICD-10-CM

## 2024-07-22 DIAGNOSIS — G93.32 CHRONIC FATIGUE SYNDROME: Primary | ICD-10-CM

## 2024-07-22 DIAGNOSIS — C73 PAPILLARY THYROID CARCINOMA (H): ICD-10-CM

## 2024-07-22 DIAGNOSIS — E89.0 HISTORY OF PARTIAL THYROIDECTOMY: ICD-10-CM

## 2024-07-22 DIAGNOSIS — D75.839 THROMBOCYTOSIS: ICD-10-CM

## 2024-07-22 DIAGNOSIS — G93.32 CHRONIC FATIGUE SYNDROME: ICD-10-CM

## 2024-07-22 DIAGNOSIS — M12.9 ARTHROPATHY: ICD-10-CM

## 2024-07-22 DIAGNOSIS — M79.10 MYALGIA: ICD-10-CM

## 2024-07-22 DIAGNOSIS — U09.9 LONG COVID: ICD-10-CM

## 2024-07-22 LAB
ANION GAP SERPL CALCULATED.3IONS-SCNC: 11 MMOL/L (ref 7–15)
BUN SERPL-MCNC: 11.2 MG/DL (ref 6–20)
CALCIUM SERPL-MCNC: 9.1 MG/DL (ref 8.8–10.4)
CHLORIDE SERPL-SCNC: 100 MMOL/L (ref 98–107)
CREAT SERPL-MCNC: 0.83 MG/DL (ref 0.51–0.95)
CRP SERPL-MCNC: 14.5 MG/L
EGFRCR SERPLBLD CKD-EPI 2021: 89 ML/MIN/1.73M2
ERYTHROCYTE [DISTWIDTH] IN BLOOD BY AUTOMATED COUNT: 12.5 % (ref 10–15)
ERYTHROCYTE [SEDIMENTATION RATE] IN BLOOD BY WESTERGREN METHOD: 12 MM/HR (ref 0–20)
GLUCOSE SERPL-MCNC: 79 MG/DL (ref 70–99)
HCO3 SERPL-SCNC: 28 MMOL/L (ref 22–29)
HCT VFR BLD AUTO: 39.2 % (ref 35–47)
HGB BLD-MCNC: 13.5 G/DL (ref 11.7–15.7)
MCH RBC QN AUTO: 29.5 PG (ref 26.5–33)
MCHC RBC AUTO-ENTMCNC: 34.4 G/DL (ref 31.5–36.5)
MCV RBC AUTO: 86 FL (ref 78–100)
PLATELET # BLD AUTO: 378 10E3/UL (ref 150–450)
POTASSIUM SERPL-SCNC: 3.8 MMOL/L (ref 3.4–5.3)
RBC # BLD AUTO: 4.58 10E6/UL (ref 3.8–5.2)
RHEUMATOID FACT SERPL-ACNC: <10 IU/ML
SODIUM SERPL-SCNC: 139 MMOL/L (ref 135–145)
T4 FREE SERPL-MCNC: 1.57 NG/DL (ref 0.9–1.7)
TSH SERPL DL<=0.005 MIU/L-ACNC: 0.95 UIU/ML (ref 0.3–4.2)
WBC # BLD AUTO: 6.4 10E3/UL (ref 4–11)

## 2024-07-22 PROCEDURE — 85027 COMPLETE CBC AUTOMATED: CPT

## 2024-07-22 PROCEDURE — 36415 COLL VENOUS BLD VENIPUNCTURE: CPT

## 2024-07-22 PROCEDURE — 86140 C-REACTIVE PROTEIN: CPT

## 2024-07-22 PROCEDURE — 85652 RBC SED RATE AUTOMATED: CPT

## 2024-07-22 PROCEDURE — 86800 THYROGLOBULIN ANTIBODY: CPT | Mod: 90

## 2024-07-22 PROCEDURE — 99000 SPECIMEN HANDLING OFFICE-LAB: CPT

## 2024-07-22 PROCEDURE — 86038 ANTINUCLEAR ANTIBODIES: CPT

## 2024-07-22 PROCEDURE — G2211 COMPLEX E/M VISIT ADD ON: HCPCS | Mod: 95 | Performed by: NURSE PRACTITIONER

## 2024-07-22 PROCEDURE — 84439 ASSAY OF FREE THYROXINE: CPT

## 2024-07-22 PROCEDURE — 86039 ANTINUCLEAR ANTIBODIES (ANA): CPT

## 2024-07-22 PROCEDURE — 84443 ASSAY THYROID STIM HORMONE: CPT

## 2024-07-22 PROCEDURE — 99213 OFFICE O/P EST LOW 20 MIN: CPT | Mod: 95 | Performed by: NURSE PRACTITIONER

## 2024-07-22 PROCEDURE — 84432 ASSAY OF THYROGLOBULIN: CPT | Mod: 90

## 2024-07-22 PROCEDURE — 80048 BASIC METABOLIC PNL TOTAL CA: CPT

## 2024-07-22 PROCEDURE — 86431 RHEUMATOID FACTOR QUANT: CPT

## 2024-07-22 NOTE — PROGRESS NOTES
Anahy is a 44 year old who is being evaluated via a billable video visit.    How would you like to obtain your AVS? MyChart  If the video visit is dropped, the invitation should be resent by: Text to cell phone: [phone]  Will anyone else be joining your video visit? No      Assessment & Plan     Chronic fatigue syndrome  Myalgia  Arthropathy  Overall, doing very well, wanting to meet today mostly for health updates.  Heading in the right direction, symptoms are improving.  Had lab work done today from our last visit now that insurance is active again.  Reluctant to discontinue chronic steroids, deferring long-term management to [facility]. Likely stress reduction with her job change has significantly impacted her symptoms.  Continue stress management.          The longitudinal plan of care for the diagnosis(es)/condition(s) as documented were addressed during this visit. Due to the added complexity in care, I will continue to support Anahy in the subsequent management and with ongoing continuity of care.        Subjective   Anahy is a 44 year old, presenting for the following health issues:  No chief complaint on file.    History of Present Illness       Reason for visit:  Follow-up and continued updates on swveral conditions    She eats 4 or more servings of fruits and vegetables daily.She consumes 0 sweetened beverage(s) daily.She exercises with enough effort to increase her heart rate 9 or less minutes per day.  She exercises with enough effort to increase her heart rate 3 or less days per week.   She is taking medications regularly.     Things are going ok  Has insurance at the moment  Will be starting a new position at a [employer], will be part time initially.  Hoping to work up to full time.  Heading in a good direction, sleeping better, stress is less with job change  Feels medication regimen is going well overall.  Hoping to be more mindful about time off.  Will be getting set up with  "long-COVID.  Just saw them somewhat recently.    Has [facility] joint appointment, thumb was frozen.      Trying to see a different endocrinologist due to convenience.  This is for annual check up to make sure cancer doesn't come back.      Working on nutrition and being more active with walking, adding small weights.  Yoga stretching joints too far with EDS.      BP has been more normal.  Stress reduction helpful, no episodes of tachycardia.      SO has COVID right now, she is feeling ok overall.      Does not want to come off the steroids- every time she stops has been in a \"bad position\".      More labs coming.           Objective           Vitals:  No vitals were obtained today due to virtual visit.    Physical Exam   GENERAL: alert and no distress  EYES: Eyes grossly normal to inspection.  No discharge or erythema, or obvious scleral/conjunctival abnormalities.  RESP: No audible wheeze, cough, or visible cyanosis.    SKIN: Visible skin clear. No significant rash, abnormal pigmentation or lesions.  NEURO: Cranial nerves grossly intact.  Mentation and speech appropriate for age.  PSYCH: Appropriate affect, tone, and pace of words          Video-Visit Details    Type of service:  Video Visit   Originating Location (pt. Location): Home    Distant Location (provider location):  Off-site  Platform used for Video Visit: Augusto  Signed Electronically by: Michelle Munoz DNP    "

## 2024-07-23 ENCOUNTER — MYC MEDICAL ADVICE (OUTPATIENT)
Dept: FAMILY MEDICINE | Facility: CLINIC | Age: 44
End: 2024-07-23
Payer: COMMERCIAL

## 2024-07-23 DIAGNOSIS — M79.10 MYALGIA: ICD-10-CM

## 2024-07-23 DIAGNOSIS — R79.82 CRP ELEVATED: Primary | ICD-10-CM

## 2024-07-23 LAB
ANA PAT SER IF-IMP: ABNORMAL
ANA SER QL IF: ABNORMAL
ANA TITR SER IF: ABNORMAL {TITER}

## 2024-07-23 NOTE — TELEPHONE ENCOUNTER
"Michelle -- do you want a VV to discuss?    \"My MAIN has always been negative (if you can't see previous results in care everywhere let me know and I can upload - some are from [place] some from when I lived in [place] some prior to care everywhere at [facility]) so that was a surprise results.      Thoughts on next step?  Just in light of my all over autoimmune things, think its a change worth chatting on.\"    Sonja Funes RN  [facility]    "

## 2024-07-25 NOTE — TELEPHONE ENCOUNTER
I would discuss further Long-COVID.  I would also just see how things go as she made it sound like things are improving.  I know [facility] probably wont see her with negative autoimmune work up.

## 2024-07-29 LAB — SCANNED LAB RESULT: NORMAL

## 2024-07-29 NOTE — TELEPHONE ENCOUNTER
She would need to let us know if she wants a referral sent to [facility] or [facility].  I would check with both prior to see who is covered and if they are accepting new patients.  Eyad referral signed.

## 2024-07-29 NOTE — TELEPHONE ENCOUNTER
"Michelle-Please review and sign if agree.      \"I have a long COVID clinic appointment in September      Just with my history it s very possibly lupus especially with the speckled pattern but would like to do the additional bloodwork and see if anything else changes with that.  As my Whitney has not been positive or speckled      But i can do a visit with her first if she would like     Anahy Martinez     Yes please send the referrals, both internal and external and I ll see where I can get in quickest.     Thank you  Anahy \"    Thank you!  DILEEP HamiltonN, RN-[facility]    "

## 2024-07-30 NOTE — TELEPHONE ENCOUNTER
"Michelle, please see pt's respond.    \"I am covered at both, I am trying to see who has soonest available appointments on my side of town but I have to just pick one please send to [facility]\"      Benito Cruz Cem Say, BSN RN  [facility]    "

## 2024-08-08 NOTE — TELEPHONE ENCOUNTER
[place] message sent to patient.  Kayy CASTILLO BSN, PHN, RN  [facility]  [phone]

## 2024-08-12 DIAGNOSIS — U09.9 LONG COVID: ICD-10-CM

## 2024-08-12 DIAGNOSIS — M79.7 FIBROMYALGIA: ICD-10-CM

## 2024-08-12 DIAGNOSIS — G93.32 CHRONIC FATIGUE SYNDROME: ICD-10-CM

## 2024-08-13 DIAGNOSIS — S06.0X9A CONCUSSION WITH LOSS OF CONSCIOUSNESS, INITIAL ENCOUNTER: ICD-10-CM

## 2024-08-15 RX ORDER — MODAFINIL 100 MG/1
TABLET ORAL
Qty: 90 TABLET | Refills: 1 | OUTPATIENT
Start: 2024-08-15

## 2024-08-19 ENCOUNTER — TELEPHONE (OUTPATIENT)
Dept: PHYSICAL MEDICINE AND REHAB | Facility: CLINIC | Age: 44
End: 2024-08-19
Payer: COMMERCIAL

## 2024-08-19 DIAGNOSIS — G93.32 CHRONIC FATIGUE SYNDROME: ICD-10-CM

## 2024-08-19 DIAGNOSIS — M79.7 FIBROMYALGIA: ICD-10-CM

## 2024-08-19 DIAGNOSIS — S06.0X9A CONCUSSION WITH LOSS OF CONSCIOUSNESS, INITIAL ENCOUNTER: ICD-10-CM

## 2024-08-19 DIAGNOSIS — U09.9 LONG COVID: ICD-10-CM

## 2024-08-19 RX ORDER — PREDNISONE 5 MG/1
5 TABLET ORAL DAILY
Qty: 30 TABLET | Refills: 1 | OUTPATIENT
Start: 2024-08-19

## 2024-08-19 NOTE — TELEPHONE ENCOUNTER
Refill request received from [facility]    Medication: modafinil (PROVIGIL) 100 MG tablet   Directions: Take one tablet by mouth once a day, may take additional tablet daily if needed for brain fog/fatigue   Last ordered: 2/14/24   Qty: 90  RF: 1    Medication: predniSONE (DELTASONE) 5 MG tablet   Directions: Take 1 Tablet (5 mg) by mouth once daily.    Last ordered: 07/18/2024    Qty: 30  RF: 1    Last OV: 05/21/24 Virtual Visit with Dr. Cao  Next OV: 09/04.24    Routing to Dr. Cao to review and sign if appropriate.    Bibi Cxo RN Care Coordinator  [facility]   [facility] main phone # [phone] fax # [phone]

## 2024-08-19 NOTE — TELEPHONE ENCOUNTER
Health Call Center    Phone Message    May a detailed message be left on voicemail: yes     Reason for Call: Medication Refill Request    Has the patient contacted the pharmacy for the refill? Yes   Name of medication being requested: modafinil (PROVIGIL) 100 MG tablet   predniSONE (DELTASONE) 5 MG tablet   Provider who prescribed the medication:     Maryann Cao MD     Pharmacy:   [facility] [address]     Date medication is needed: 8/20/2024     Other:      Action Taken: Message routed to:  [facility] PMR    Travel Screening: Not Applicable     Date of Service:

## 2024-08-20 ENCOUNTER — TELEPHONE (OUTPATIENT)
Dept: FAMILY MEDICINE | Facility: CLINIC | Age: 44
End: 2024-08-20
Payer: COMMERCIAL

## 2024-08-20 DIAGNOSIS — U09.9 LONG COVID: ICD-10-CM

## 2024-08-20 DIAGNOSIS — M79.7 FIBROMYALGIA: ICD-10-CM

## 2024-08-20 DIAGNOSIS — G93.32 CHRONIC FATIGUE SYNDROME: ICD-10-CM

## 2024-08-20 ASSESSMENT — ASTHMA QUESTIONNAIRES
ACT_TOTALSCORE: 21
QUESTION_4 LAST FOUR WEEKS HOW OFTEN HAVE YOU USED YOUR RESCUE INHALER OR NEBULIZER MEDICATION (SUCH AS ALBUTEROL): ONCE A WEEK OR LESS
ACT_TOTALSCORE: 21
QUESTION_3 LAST FOUR WEEKS HOW OFTEN DID YOUR ASTHMA SYMPTOMS (WHEEZING, COUGHING, SHORTNESS OF BREATH, CHEST TIGHTNESS OR PAIN) WAKE YOU UP AT NIGHT OR EARLIER THAN USUAL IN THE MORNING: ONCE OR TWICE
QUESTION_2 LAST FOUR WEEKS HOW OFTEN HAVE YOU HAD SHORTNESS OF BREATH: ONCE OR TWICE A WEEK
QUESTION_1 LAST FOUR WEEKS HOW MUCH OF THE TIME DID YOUR ASTHMA KEEP YOU FROM GETTING AS MUCH DONE AT WORK, SCHOOL OR AT HOME: NONE OF THE TIME
QUESTION_5 LAST FOUR WEEKS HOW WOULD YOU RATE YOUR ASTHMA CONTROL: WELL CONTROLLED

## 2024-08-20 NOTE — TELEPHONE ENCOUNTER
There has no been no response from [facility] and original request was routed 8/13/24.    While we await their response please advise on what patient should expect/next steps as she will be completely out after today and did inquire that in original call-thanks!

## 2024-08-20 NOTE — TELEPHONE ENCOUNTER
"Called and spoke with patient. She states she called the long [facility] office today and they took a message. She was very irate with writer yelling \"why can't Michelle just fill this?!\"    Informed patient that Michelle has stated she will not be filling this thus no colleagues will be filling it for her in her absence.    Discussed with patient the option of getting her an appointment with another clinician to discuss refills of these medications given that one is a controlled substance and no clinician is going to refill a controlled substance for a patient they have not seen.     Patient continued to yell at writer to which writer ended the call.     Sonja Funes RN  [facility]    "

## 2024-08-20 NOTE — TELEPHONE ENCOUNTER
Patient reached out nervous and frustrated. Has been trying to get refills addressed for past week and has not been hearing back.   States she can not just quit cold turkey and she will be completely out tomorrow.   Knows PCP does not want to manage long term, but states it had been discussed she would help bridge until patient able to get back into long covid clinic.  Says she has a Rheumatology appt with [facility] next week, she is trying to do all necessary steps and does not understand why requests are not being addressed.  Please review, sign if agreeable and if not agreeable please share next steps for patient regarding stopping cold turkey etc.     If able to send in refill MyC message can be sent, but if unable patient requests a telephone call with the next steps/what to expect-thanks!

## 2024-08-20 NOTE — TELEPHONE ENCOUNTER
PCP is out of office. But clear message prior from PCP that these meds are managed by pt's long covid provider. I will respect PCP's wishes. Defer refills to Dr. Jenkins. Pls forward this message to pt.     Miguelangel Heredia, DO

## 2024-08-21 ENCOUNTER — VIRTUAL VISIT (OUTPATIENT)
Dept: FAMILY MEDICINE | Facility: CLINIC | Age: 44
End: 2024-08-21
Payer: COMMERCIAL

## 2024-08-21 DIAGNOSIS — M79.7 FIBROMYALGIA: ICD-10-CM

## 2024-08-21 DIAGNOSIS — U09.9 LONG COVID: ICD-10-CM

## 2024-08-21 DIAGNOSIS — S06.0X9A CONCUSSION WITH LOSS OF CONSCIOUSNESS, INITIAL ENCOUNTER: ICD-10-CM

## 2024-08-21 DIAGNOSIS — G93.32 CHRONIC FATIGUE SYNDROME: ICD-10-CM

## 2024-08-21 PROCEDURE — 99214 OFFICE O/P EST MOD 30 MIN: CPT | Mod: 95 | Performed by: PHYSICIAN ASSISTANT

## 2024-08-21 RX ORDER — MODAFINIL 100 MG/1
TABLET ORAL
Qty: 90 TABLET | Refills: 0 | Status: SHIPPED | OUTPATIENT
Start: 2024-08-21 | End: 2024-09-05

## 2024-08-21 RX ORDER — PREDNISONE 5 MG/1
5 TABLET ORAL DAILY
Qty: 30 TABLET | Refills: 1 | Status: SHIPPED | OUTPATIENT
Start: 2024-08-21 | End: 2024-09-05

## 2024-08-21 NOTE — PROGRESS NOTES
Anahy is a 44 year old who is being evaluated via a billable video visit.    How would you like to obtain your AVS? MyChart  If the video visit is dropped, the invitation should be resent by: Text to cell phone: [phone]  Will anyone else be joining your video visit? No      Assessment & Plan     Long COVID  Reports she had requested a refill that was accidentally sent to her pcp who is not longer at the clinic.  She was unable to get the refill from [facility] before her medications ran out and has now run out of her medications.  She has an appt with [facility] in early September, will refill provigil and prednisone in the mean time  - predniSONE (DELTASONE) 5 MG tablet  Dispense: 30 tablet; Refill: 1    Chronic fatigue syndrome  - predniSONE (DELTASONE) 5 MG tablet  Dispense: 30 tablet; Refill: 1    Fibromyalgia  - predniSONE (DELTASONE) 5 MG tablet  Dispense: 30 tablet; Refill: 1    Concussion with loss of consciousness, initial encounter  - modafinil (PROVIGIL) 100 MG tablet  Dispense: 90 tablet; Refill: 0              Subjective   Anahy is a 44 year old, presenting for the following health issues:  Autoimmune Disease (Has long covid and has been taking 5mg of prednisone for a long time for long covid and autoimmune disorder. Needs refills. ) and Establish Care (No longer had a PCP, looking for new doc, understands she may need to be seen in person for this. )    History of Present Illness       Reason for visit:  Follow up care for unknown autoimmune disorder and long covid    She eats 4 or more servings of fruits and vegetables daily.She consumes 0 sweetened beverage(s) daily.She exercises with enough effort to increase her heart rate 10 to 19 minutes per day.  She exercises with enough effort to increase her heart rate 3 or less days per week.   She is taking medications regularly.     Tbi 2007,      Long covid- after covid January 2022, sees [facility], has follow up appt next  month.      [facility], s/p ablation    Autoimmune concern- will see rheumatology at [facility] next month.    Depression/anxiety/panic, sees a therapist weekly.  Takes 1 mg ativan daily. Was on a different anxiety medication in the past that raised her blood pressure.           Objective           Vitals:  No vitals were obtained today due to virtual visit.    Physical Exam   GENERAL: alert and no distress  EYES: Eyes grossly normal to inspection.  No discharge or erythema, or obvious scleral/conjunctival abnormalities.  RESP: No audible wheeze, cough, or visible cyanosis.    SKIN: Visible skin clear. No significant rash, abnormal pigmentation or lesions.  NEURO: Cranial nerves grossly intact.  Mentation and speech appropriate for age.  PSYCH: Appropriate affect, tone, and pace of words          Video-Visit Details    Type of service:  Video Visit   Originating Location (pt. Location): Home    Distant Location (provider location):  Off-site  Platform used for Video Visit: Augusto  Signed Electronically by: Shanice Joaquin PA-C

## 2024-08-23 RX ORDER — PREDNISONE 5 MG/1
5 TABLET ORAL DAILY
Qty: 30 TABLET | Refills: 1 | OUTPATIENT
Start: 2024-08-23

## 2024-08-26 RX ORDER — PREDNISONE 5 MG/1
5 TABLET ORAL DAILY
Qty: 30 TABLET | Refills: 1 | Status: SHIPPED | OUTPATIENT
Start: 2024-08-26

## 2024-08-26 RX ORDER — MODAFINIL 100 MG/1
TABLET ORAL
Qty: 90 TABLET | Refills: 1 | Status: SHIPPED | OUTPATIENT
Start: 2024-08-26

## 2024-08-27 ENCOUNTER — TRANSFERRED RECORDS (OUTPATIENT)
Dept: HEALTH INFORMATION MANAGEMENT | Facility: CLINIC | Age: 44
End: 2024-08-27
Payer: COMMERCIAL

## 2024-08-28 ENCOUNTER — MYC MEDICAL ADVICE (OUTPATIENT)
Dept: FAMILY MEDICINE | Facility: CLINIC | Age: 44
End: 2024-08-28
Payer: COMMERCIAL

## 2024-08-28 DIAGNOSIS — K21.9 CHRONIC GERD: ICD-10-CM

## 2024-08-28 RX ORDER — PANTOPRAZOLE SODIUM 40 MG/1
40 TABLET, DELAYED RELEASE ORAL 2 TIMES DAILY
Qty: 180 TABLET | Refills: 3 | Status: SHIPPED | OUTPATIENT
Start: 2024-08-28

## 2024-08-28 NOTE — TELEPHONE ENCOUNTER
Pt asking for refill on her:  pantoprazole 40 mg, 2 times a day 90 day  Pharamacy is:  [facility]

## 2024-08-30 SDOH — SOCIAL STABILITY: SOCIAL NETWORK: I HAVE TROUBLE DOING ALL OF MY USUAL WORK (INCLUDE WORK AT HOME): RARELY

## 2024-08-30 SDOH — SOCIAL STABILITY: SOCIAL NETWORK: PROMIS ABILITY TO PARTICIPATE IN SOCIAL ROLES & ACTIVITIES T-SCORE: 52

## 2024-08-30 SDOH — SOCIAL STABILITY: SOCIAL NETWORK: I HAVE TROUBLE DOING ALL OF THE FAMILY ACTIVITIES THAT I WANT TO DO: RARELY

## 2024-08-30 SDOH — SOCIAL STABILITY: SOCIAL NETWORK: I HAVE TROUBLE DOING ALL OF THE ACTIVITIES WITH FRIENDS THAT I WANT TO DO: RARELY

## 2024-08-30 SDOH — SOCIAL STABILITY: SOCIAL NETWORK: I HAVE TROUBLE DOING ALL OF MY REGULAR LEISURE ACTIVITIES WITH OTHERS: RARELY

## 2024-08-30 SDOH — SOCIAL STABILITY: SOCIAL NETWORK

## 2024-08-30 ASSESSMENT — PATIENT HEALTH QUESTIONNAIRE - PHQ9
10. IF YOU CHECKED OFF ANY PROBLEMS, HOW DIFFICULT HAVE THESE PROBLEMS MADE IT FOR YOU TO DO YOUR WORK, TAKE CARE OF THINGS AT HOME, OR GET ALONG WITH OTHER PEOPLE: NOT DIFFICULT AT ALL
SUM OF ALL RESPONSES TO PHQ QUESTIONS 1-9: 2
SUM OF ALL RESPONSES TO PHQ QUESTIONS 1-9: 2

## 2024-08-30 ASSESSMENT — ANXIETY QUESTIONNAIRES
7. FEELING AFRAID AS IF SOMETHING AWFUL MIGHT HAPPEN: NOT AT ALL
GAD7 TOTAL SCORE: 1
8. IF YOU CHECKED OFF ANY PROBLEMS, HOW DIFFICULT HAVE THESE MADE IT FOR YOU TO DO YOUR WORK, TAKE CARE OF THINGS AT HOME, OR GET ALONG WITH OTHER PEOPLE?: NOT DIFFICULT AT ALL
GAD7 TOTAL SCORE: 1
GAD7 TOTAL SCORE: 1

## 2024-09-01 ENCOUNTER — HEALTH MAINTENANCE LETTER (OUTPATIENT)
Age: 44
End: 2024-09-01

## 2024-09-04 ENCOUNTER — VIRTUAL VISIT (OUTPATIENT)
Dept: PHYSICAL MEDICINE AND REHAB | Facility: CLINIC | Age: 44
End: 2024-09-04
Payer: COMMERCIAL

## 2024-09-04 ENCOUNTER — MYC MEDICAL ADVICE (OUTPATIENT)
Dept: FAMILY MEDICINE | Facility: CLINIC | Age: 44
End: 2024-09-04

## 2024-09-04 DIAGNOSIS — U09.9 LONG COVID: Primary | ICD-10-CM

## 2024-09-04 DIAGNOSIS — G93.32 CHRONIC FATIGUE SYNDROME: ICD-10-CM

## 2024-09-04 PROCEDURE — 99213 OFFICE O/P EST LOW 20 MIN: CPT | Mod: 95 | Performed by: PHYSICAL MEDICINE & REHABILITATION

## 2024-09-04 NOTE — PROGRESS NOTES
Anahy is a 44 year old who is being evaluated via a billable video visit.    How would you like to obtain your AVS? MyChart  If the video visit is dropped, the invitation should be resent by: Text to cell phone: [phone]  Will anyone else be joining your video visit? No          Subjective   Anahy is a 44 year old, presenting for the following health issues:  Video Visit (3 month follow-up )  Video Visit (3 month follow-up )    Assessment and plan   Anahy Urena is a 44 year old female with history of thyroid tumor, and concussion with LOC who presents following an acute COVID infection in January 2022 leading to long COVID syndrome.  Her symptoms are mostly in the area of body aches and brain fog today.      She has many autoimmune like symptoms and has done significant work up at various places, paying it for herself. She notes that she has pernicious anemia per parietal cell antibodies, though I have not been able to see it Care everywhere.    PEM is not part of her symptoms   She has significant difficulty focusing even to answer my questions   She does not have PEM.     Her symptoms are more consistent with fibromyalgia now, and I would like to see her in person for the next visit. She concurs that both PT and her rheumatology also discussed with her the similar options.   Recommend modafinil and prednisone for now.   Counseled extensively on exercise regimen.   I will see her in person in my Tuesday or Thursday clinic.     Maryann Cao MD, A   Department of Rehabilitation           HPI   Current Symptoms:  Brain fog; on modafinil 100 mg BID   She is unable to move due to 'she is going to break something'    Her mental health scores are all intact.   She is independent with basic adl's, she is able to walk and stair navigation. She is able to walk a 0.5 miles, and can navigate 3 flights of stairs at a stretch.     She has problems with sleep, but when she sleeps she feels much better. She does  3-4 hours at a time. She takes a benadryl which helps with body aches. Xanax also helps. Neurontin does not help.     She is on 5 mg of prednisone allows her to be independent with her adl's. She has tried methotrexate at a low dose which did not help. NSAID are not helpful.   She is off the amantadine and guanfacine. Guanfacine made her depressed.   She completed neuropsychological testing at [place].   She has completed PT/OT.     She states that it is very hard to move. She has been getting steroid injections to the thumb. Review of the Care Everywhere she did undergo a steroid injection of the CMC joint on 7/31/24. She has had ankle surgeries done, when she tore her ligaments she states.   She has been seeing ortho for knees and is considering knee replacement surgery.   Reviewed all imaging results, and noted a thumb x-rays done in 12/2/23.     US of the thyroid following the initial COVID as a follow up  She also saw a rheumatologist, underwent extensive work up, but she felt like she was not heard.   She did blood work by herself, at [facility].   She states that she has pernicious anemia and feels she needs B12 tablets. She states that it was possible that she had anemia. She has antiparietal cell antibodies.     She is currently working for herself, as an . 20-30 hours/week. Her brain fog waxes and wanes. She is unable to multi-task.               8/30/2024     9:47 AM   PHQ Assesment Total Score(s)   PHQ-9 Score 2           8/30/2024     9:48 AM   YONY-7 Results   YONY 7 TOTAL SCORE 1 (minimal anxiety)   YONY-7 Total Score 1         8/30/2024     9:48 AM   PTSD Screen Score   Have you ever experienced this kind of event? Yes   PTSD Screen (Score of 3 or more suggests positive screen) 0         8/30/2024     9:49 AM   PROMIS-29   PROMIS Physical Function T-Score 42 (mild dysfunction)   PROMIS Anxiety T-Score 48 (within normal limits)   PROMIS Depression T-Score 41 (within normal limits)   PROMIS Fatigue  T-Score 49 (within normal limits)   PROMIS Sleep Disturbance T-Score 51 (within normal limits)   PROMIS Ability to Participate in Social Roles & Activities T-Score 52 (within normal limits)   PROMIS Pain Interference T-Score 42 (within normal limits)   PROMIS Pain Intensity 2           Past Medical History:   Diagnosis Date    Abnormal uterine bleeding 2020    Acne vulgaris     Anemia 1988    Anxiety     Arthritis 1/15/2022    RA is negative    Asthma     Concussion 05/2007    Eczema 1993    Endometriosis     GERD (gastroesophageal reflux disease)     H/O partial thyroidectomy     HTN (hypertension)     Hypertension 5/15/2022    Infection due to 2019 novel coronavirus 01/04/2022    Long COVID     Major depression, single episode 02/07/2023    papillary thyroid carcinoma 08/16/2022    left 0.4 cm    PCOS (polycystic ovarian syndrome)     Pink eye disease of both eyes 10/24/2018    Rosacea     Thrombocytosis        Past Surgical History:   Procedure Laterality Date    ABDOMEN SURGERY  2001, 2020    laparoscopy on ovaries    ANKLE SURGERY      BIOPSY  neck 1999    BRACHIAL PLEXUS EXPLORATION      ENT SURGERY  1999    neck tumor    exploratory laparscopy  2001    endometriosis    EYE SURGERY  2015    GYN SURGERY      HEAD & NECK SURGERY  1999    LAPAROSCOPIC ENDOMETRIOSIS FULGURATION      LAPAROSCOPIC SALPINGECTOMY N/A 07/20/2023    Procedure: LAPAROSCOPY WITH LEFT SALPINGO OOPHORECTOMY RIGHT SALPINGECTOMY,HYSTEROSCOPY, WITH DILATION AND CURETTAGE, ENDOMETRIAL ABLATION, REPAIR OF CERVICAL LACERATION;  Surgeon: Jossy Rodriguez MD;  Location: [facility]    ORTHOPEDIC SURGERY  2019, 2022    SC HYSTEROSCOPY,W/ENDO BX N/A 11/30/2020    Procedure: HYSTEROSCOPY WITH DILATION AND CURETTAGE, CERVICAL POLYPECTOMY AND PLACEMENT OF MIRENA INTRAUTERINE DEVICE;  Surgeon: Jossy Rodriguez MD;  Location: [facility];  Service: Gynecology    TEAR DUCT SURGERY  1983       Family History   Problem Relation Age of  "Onset    Hypertension Mother     Asthma Mother     Genetic Disorder Mother         Bone deformation; hand bone deformity; \"Elizabeth hands\";    Hyperlipidemia Father     Anxiety Disorder Brother     Mental Illness Brother         Torettes Syndrome, OCD, ODDS    Genetic Disorder Brother         Bone deformation;hand deformity he was born with ; brachydactyly    Diabetes Maternal Grandfather     Mental Illness Paternal Grandfather         OCD    Depression Daughter     Anxiety Disorder Daughter     Asthma Daughter     Anxiety Disorder Daughter     Asthma Daughter     Breast Cancer Maternal Aunt     Other Cancer Maternal Aunt         She had ovarian, cervical, brain, lymphoma    Thyroid Cancer Maternal Aunt         type unknown    Leukemia Maternal Aunt         chidlhood    Brain Tumor Maternal Aunt     Depression Maternal Aunt     Hyperthyroidism Maternal Aunt         Also her daughter has thyroid issues    Gestational Diabetes Maternal Aunt     Diabetes Maternal Uncle     Depression Paternal Aunt     Breast Cancer Paternal Aunt     Anxiety Disorder Paternal Aunt     Prostate Cancer Paternal Uncle     Asthma Niece     Asthma Nephew     Thyroid Disease Cousin        Social History     Tobacco Use    Smoking status: Never    Smokeless tobacco: Never   Vaping Use    Vaping status: Never Used   Substance Use Topics    Alcohol use: Yes     Comment: Socially    Drug use: Never         Current Outpatient Medications:     albuterol (PROAIR HFA;PROVENTIL HFA;VENTOLIN HFA) 90 mcg/actuation inhaler, Inhale 2 puffs into the lungs every 6 hours as needed , Disp: , Rfl:     clobetasol propionate (CLOBEX) 0.05 % external shampoo, 02/22/2022 Clobetasol Shampoo 0.05 %      02/22/2022  active, Disp: , Rfl:     eletriptan (RELPAX) 20 MG tablet, Take 1-2 tablets (20-40 mg) by mouth at onset of headache for migraine May repeat in 2 hours. Max 4 tablets/24 hours., Disp: 30 tablet, Rfl: 3    furosemide (LASIX) 20 MG tablet, Take 2 tablets in " the AM and one tablet in the PM, Disp: 270 tablet, Rfl: 0    levothyroxine (SYNTHROID/LEVOTHROID) 75 MCG tablet, Take 1 tablet (75 mcg) by mouth daily, Disp: 90 tablet, Rfl: 1    loratadine-pseudoePHEDrine (CLARITIN-D 24 HOUR)  MG 24 hr tablet, Take 1 tablet by mouth daily, Disp: 90 tablet, Rfl: 3    LORazepam (ATIVAN) 1 MG tablet, Take 1 tablet (1 mg) by mouth 3 times daily as needed for anxiety or sleep, Disp: 90 tablet, Rfl: 5    Loteprednol Etabonate (LOTEMAX SM) 0.38 % GEL, Apply 1 drop to eye, Disp: , Rfl:     modafinil (PROVIGIL) 100 MG tablet, Take one tablet by mouth once a day, may take additional tablet daily if needed for brain fog/fatigue, Disp: 90 tablet, Rfl: 1    ondansetron (ZOFRAN ODT) 4 MG ODT tab, Take 1 tablet (4 mg) by mouth every 8 hours as needed for nausea, Disp: 10 tablet, Rfl: 0    pantoprazole (PROTONIX) 40 MG EC tablet, Take 1 tablet (40 mg) by mouth 2 times daily., Disp: 180 tablet, Rfl: 3    potassium chloride ER (K-TAB) 20 MEQ CR tablet, Take 1 tablet (20 mEq) by mouth 2 times daily, Disp: 180 tablet, Rfl: 3    predniSONE (DELTASONE) 10 MG tablet, Take 10 mg a day as needed for flare x 5 days and then go back to 5 mg, may repeat one time., Disp: 10 tablet, Rfl: 0    predniSONE (DELTASONE) 5 MG tablet, Take 1 tablet (5 mg) by mouth daily., Disp: 30 tablet, Rfl: 1    modafinil (PROVIGIL) 100 MG tablet, Take one tablet by mouth once a day, may take additional tablet daily if needed for brain fog/fatigue, Disp: 90 tablet, Rfl: 0    predniSONE (DELTASONE) 5 MG tablet, Take 1 tablet (5 mg) by mouth daily., Disp: 30 tablet, Rfl: 1      Review of Systems  Constitutional, HEENT, cardiovascular, pulmonary, gi and gu systems are negative, except as otherwise noted.      Objective    Vitals - Patient Reported  Pain Score: No Pain (0)        Physical Exam   GENERAL: alert and no distress  EYES: Eyes grossly normal to inspection.  No discharge or erythema, or obvious scleral/conjunctival  abnormalities.  RESP: No audible wheeze, cough, or visible cyanosis.    SKIN: Visible skin clear. No significant rash, abnormal pigmentation or lesions.  NEURO: Cranial nerves grossly intact.  Mentation and speech appropriate for age.  PSYCH: Appropriate affect, tone, and pace of words          Video-Visit Details    Type of service:  Video Visit   Originating Location (pt. Location): Home    Distant Location (provider location):  Off-site  Platform used for Video Visit: [facility]  Signed Electronically by: Maryann Cao MD    Answers submitted by the patient for this visit:  Patient Health Questionnaire (Submitted on 8/30/2024)  If you checked off any problems, how difficult have these problems made it for you to do your work, take care of things at home, or get along with other people?: Not difficult at all  PHQ9 TOTAL SCORE: 2  Patient Health Questionnaire (G7) (Submitted on 8/30/2024)  YONY 7 TOTAL SCORE: 1

## 2024-09-04 NOTE — LETTER
9/4/2024       RE: Anahy Urena  [address] [address]     Dear Colleague,    Thank you for referring your patient, Anahy Urena, to the [facility] PHYSICAL [facility]ICINE [facility]HABILITATION CLINIC [place] at [facility]. Please see a copy of my visit note below.    Anahy is a 44 year old who is being evaluated via a billable video visit.    How would you like to obtain your AVS? MyChart  If the video visit is dropped, the invitation should be resent by: Text to cell phone: [phone]  Will anyone else be joining your video visit? No          Subjective  Anahy is a 44 year old, presenting for the following health issues:  Video Visit (3 month follow-up )  Video Visit (3 month follow-up )    Assessment and plan   Anahy Urena is a 44 year old female with history of thyroid tumor, and concussion with LOC who presents following an acute COVID infection in January 2022 leading to long COVID syndrome.  Her symptoms are mostly in the area of body aches and brain fog today.      She has many autoimmune like symptoms and has done significant work up at various places, paying it for herself. She notes that she has pernicious anemia per parietal cell antibodies, though I have not been able to see it Care everywhere.    PEM is not part of her symptoms   She has significant difficulty focusing even to answer my questions   She does not have PEM.     Her symptoms are more consistent with fibromyalgia now, and I would like to see her in person for the next visit. She concurs that both PT and her rheumatology also discussed with her the similar options.   Recommend modafinil and prednisone for now.   Counseled extensively on exercise regimen.   I will see her in person in my Tuesday or Thursday clinic.     Maryann Cao MD, A   Department of Rehabilitation           HPI   Current Symptoms:  Brain fog; on modafinil 100 mg BID   She is unable to  move due to 'she is going to break something'    Her mental health scores are all intact.   She is independent with basic adl's, she is able to walk and stair navigation. She is able to walk a 0.5 miles, and can navigate 3 flights of stairs at a stretch.     She has problems with sleep, but when she sleeps she feels much better. She does 3-4 hours at a time. She takes a benadryl which helps with body aches. Xanax also helps. Neurontin does not help.     She is on 5 mg of prednisone allows her to be independent with her adl's. She has tried methotrexate at a low dose which did not help. NSAID are not helpful.   She is off the amantadine and guanfacine. Guanfacine made her depressed.   She completed neuropsychological testing at [facility].   She has completed PT/OT.     She states that it is very hard to move. She has been getting steroid injections to the thumb. Review of the Care Everywhere she did undergo a steroid injection of the CMC joint on 7/31/24. She has had ankle surgeries done, when she tore her ligaments she states.   She has been seeing ortho for knees and is considering knee replacement surgery.   Reviewed all imaging results, and noted a thumb x-rays done in 12/2/23.     US of the thyroid following the initial COVID as a follow up  She also saw a rheumatologist, underwent extensive work up, but she felt like she was not heard.   She did blood work by herself, at [facility].   She states that she has pernicious anemia and feels she needs B12 tablets. She states that it was possible that she had anemia. She has antiparietal cell antibodies.     She is currently working for herself, as an . 20-30 hours/week. Her brain fog waxes and wanes. She is unable to multi-task.               8/30/2024     9:47 AM   PHQ Assesment Total Score(s)   PHQ-9 Score 2           8/30/2024     9:48 AM   YONY-7 Results   YONY 7 TOTAL SCORE 1 (minimal anxiety)   YONY-7 Total Score 1         8/30/2024     9:48 AM   PTSD Screen Score    Have you ever experienced this kind of event? Yes   PTSD Screen (Score of 3 or more suggests positive screen) 0         8/30/2024     9:49 AM   PROMIS-29   PROMIS Physical Function T-Score 42 (mild dysfunction)   PROMIS Anxiety T-Score 48 (within normal limits)   PROMIS Depression T-Score 41 (within normal limits)   PROMIS Fatigue T-Score 49 (within normal limits)   PROMIS Sleep Disturbance T-Score 51 (within normal limits)   PROMIS Ability to Participate in Social Roles & Activities T-Score 52 (within normal limits)   PROMIS Pain Interference T-Score 42 (within normal limits)   PROMIS Pain Intensity 2           Past Medical History:   Diagnosis Date     Abnormal uterine bleeding 2020     Acne vulgaris      Anemia 1988     Anxiety      Arthritis 1/15/2022    RA is negative     Asthma      Concussion 05/2007     Eczema 1993     Endometriosis      GERD (gastroesophageal reflux disease)      H/O partial thyroidectomy      HTN (hypertension)      Hypertension 5/15/2022     Infection due to 2019 novel coronavirus 01/04/2022     Long COVID      Major depression, single episode 02/07/2023     papillary thyroid carcinoma 08/16/2022    left 0.4 cm     PCOS (polycystic ovarian syndrome)      Pink eye disease of both eyes 10/24/2018     Rosacea      Thrombocytosis        Past Surgical History:   Procedure Laterality Date     ABDOMEN SURGERY  2001, 2020    laparoscopy on ovaries     ANKLE SURGERY       BIOPSY  neck 1999     BRACHIAL PLEXUS EXPLORATION       ENT SURGERY  1999    neck tumor     exploratory laparscopy  2001    endometriosis     EYE SURGERY  2015     GYN SURGERY       HEAD & NECK SURGERY  1999     LAPAROSCOPIC ENDOMETRIOSIS FULGURATION       LAPAROSCOPIC SALPINGECTOMY N/A 07/20/2023    Procedure: LAPAROSCOPY WITH LEFT SALPINGO OOPHORECTOMY RIGHT SALPINGECTOMY,HYSTEROSCOPY, WITH DILATION AND CURETTAGE, ENDOMETRIAL ABLATION, REPAIR OF CERVICAL LACERATION;  Surgeon: Jossy Rodriguez MD;  Location: [place]  "Main OR     ORTHOPEDIC SURGERY  2019, 2022     MD HYSTEROSCOPY,W/ENDO BX N/A 11/30/2020    Procedure: HYSTEROSCOPY WITH DILATION AND CURETTAGE, CERVICAL POLYPECTOMY AND PLACEMENT OF MIRENA INTRAUTERINE DEVICE;  Surgeon: Jossy Rodriguez MD;  Location: [facility];  Service: Gynecology     TEAR DUCT SURGERY  1983       Family History   Problem Relation Age of Onset     Hypertension Mother      Asthma Mother      Genetic Disorder Mother         Bone deformation; hand bone deformity; \"Elizabeth hands\";     Hyperlipidemia Father      Anxiety Disorder Brother      Mental Illness Brother         Torettes Syndrome, OCD, ODDS     Genetic Disorder Brother         Bone deformation;hand deformity he was born with ; brachydactyly     Diabetes Maternal Grandfather      Mental Illness Paternal Grandfather         OCD     Depression Daughter      Anxiety Disorder Daughter      Asthma Daughter      Anxiety Disorder Daughter      Asthma Daughter      Breast Cancer Maternal Aunt      Other Cancer Maternal Aunt         She had ovarian, cervical, brain, lymphoma     Thyroid Cancer Maternal Aunt         type unknown     Leukemia Maternal Aunt         chidlhood     Brain Tumor Maternal Aunt      Depression Maternal Aunt      Hyperthyroidism Maternal Aunt         Also her daughter has thyroid issues     Gestational Diabetes Maternal Aunt      Diabetes Maternal Uncle      Depression Paternal Aunt      Breast Cancer Paternal Aunt      Anxiety Disorder Paternal Aunt      Prostate Cancer Paternal Uncle      Asthma Niece      Asthma Nephew      Thyroid Disease Cousin        Social History     Tobacco Use     Smoking status: Never     Smokeless tobacco: Never   Vaping Use     Vaping status: Never Used   Substance Use Topics     Alcohol use: Yes     Comment: Socially     Drug use: Never         Current Outpatient Medications:      albuterol (PROAIR HFA;PROVENTIL HFA;VENTOLIN HFA) 90 mcg/actuation inhaler, Inhale 2 puffs into the lungs " every 6 hours as needed , Disp: , Rfl:      clobetasol propionate (CLOBEX) 0.05 % external shampoo, 02/22/2022 Clobetasol Shampoo 0.05 %      02/22/2022  active, Disp: , Rfl:      eletriptan (RELPAX) 20 MG tablet, Take 1-2 tablets (20-40 mg) by mouth at onset of headache for migraine May repeat in 2 hours. Max 4 tablets/24 hours., Disp: 30 tablet, Rfl: 3     furosemide (LASIX) 20 MG tablet, Take 2 tablets in the AM and one tablet in the PM, Disp: 270 tablet, Rfl: 0     levothyroxine (SYNTHROID/LEVOTHROID) 75 MCG tablet, Take 1 tablet (75 mcg) by mouth daily, Disp: 90 tablet, Rfl: 1     loratadine-pseudoePHEDrine (CLARITIN-D 24 HOUR)  MG 24 hr tablet, Take 1 tablet by mouth daily, Disp: 90 tablet, Rfl: 3     LORazepam (ATIVAN) 1 MG tablet, Take 1 tablet (1 mg) by mouth 3 times daily as needed for anxiety or sleep, Disp: 90 tablet, Rfl: 5     Loteprednol Etabonate (LOTEMAX SM) 0.38 % GEL, Apply 1 drop to eye, Disp: , Rfl:      modafinil (PROVIGIL) 100 MG tablet, Take one tablet by mouth once a day, may take additional tablet daily if needed for brain fog/fatigue, Disp: 90 tablet, Rfl: 1     ondansetron (ZOFRAN ODT) 4 MG ODT tab, Take 1 tablet (4 mg) by mouth every 8 hours as needed for nausea, Disp: 10 tablet, Rfl: 0     pantoprazole (PROTONIX) 40 MG EC tablet, Take 1 tablet (40 mg) by mouth 2 times daily., Disp: 180 tablet, Rfl: 3     potassium chloride ER (K-TAB) 20 MEQ CR tablet, Take 1 tablet (20 mEq) by mouth 2 times daily, Disp: 180 tablet, Rfl: 3     predniSONE (DELTASONE) 10 MG tablet, Take 10 mg a day as needed for flare x 5 days and then go back to 5 mg, may repeat one time., Disp: 10 tablet, Rfl: 0     predniSONE (DELTASONE) 5 MG tablet, Take 1 tablet (5 mg) by mouth daily., Disp: 30 tablet, Rfl: 1     modafinil (PROVIGIL) 100 MG tablet, Take one tablet by mouth once a day, may take additional tablet daily if needed for brain fog/fatigue, Disp: 90 tablet, Rfl: 0     predniSONE (DELTASONE) 5 MG tablet,  Take 1 tablet (5 mg) by mouth daily., Disp: 30 tablet, Rfl: 1      Review of Systems  Constitutional, HEENT, cardiovascular, pulmonary, gi and gu systems are negative, except as otherwise noted.      Objective   Vitals - Patient Reported  Pain Score: No Pain (0)        Physical Exam   GENERAL: alert and no distress  EYES: Eyes grossly normal to inspection.  No discharge or erythema, or obvious scleral/conjunctival abnormalities.  RESP: No audible wheeze, cough, or visible cyanosis.    SKIN: Visible skin clear. No significant rash, abnormal pigmentation or lesions.  NEURO: Cranial nerves grossly intact.  Mentation and speech appropriate for age.  PSYCH: Appropriate affect, tone, and pace of words          Video-Visit Details    Type of service:  Video Visit   Originating Location (pt. Location): Home    Distant Location (provider location):  Off-site  Platform used for Video Visit: [facility]  Signed Electronically by: Maryann Cao MD    Answers submitted by the patient for this visit:  Patient Health Questionnaire (Submitted on 8/30/2024)  If you checked off any problems, how difficult have these problems made it for you to do your work, take care of things at home, or get along with other people?: Not difficult at all  PHQ9 TOTAL SCORE: 2  Patient Health Questionnaire (G7) (Submitted on 8/30/2024)  YONY 7 TOTAL SCORE: 1      Again, thank you for allowing me to participate in the care of your patient.      Sincerely,    Maryann Cao MD

## 2024-09-05 ENCOUNTER — OFFICE VISIT (OUTPATIENT)
Dept: FAMILY MEDICINE | Facility: CLINIC | Age: 44
End: 2024-09-05
Payer: COMMERCIAL

## 2024-09-05 ENCOUNTER — MYC MEDICAL ADVICE (OUTPATIENT)
Dept: FAMILY MEDICINE | Facility: CLINIC | Age: 44
End: 2024-09-05

## 2024-09-05 VITALS
WEIGHT: 154 LBS | BODY MASS INDEX: 25.63 KG/M2 | HEART RATE: 107 BPM | OXYGEN SATURATION: 97 % | DIASTOLIC BLOOD PRESSURE: 98 MMHG | SYSTOLIC BLOOD PRESSURE: 134 MMHG

## 2024-09-05 DIAGNOSIS — E03.9 HYPOTHYROIDISM, UNSPECIFIED TYPE: ICD-10-CM

## 2024-09-05 DIAGNOSIS — M35.7 HYPERMOBILITY SYNDROME: ICD-10-CM

## 2024-09-05 DIAGNOSIS — M13.0 POLYARTHROPATHY: ICD-10-CM

## 2024-09-05 DIAGNOSIS — D75.839 THROMBOCYTOSIS: ICD-10-CM

## 2024-09-05 DIAGNOSIS — E87.6 HYPOKALEMIA: Primary | ICD-10-CM

## 2024-09-05 LAB
ALBUMIN SERPL BCG-MCNC: 4.7 G/DL (ref 3.5–5.2)
ALP SERPL-CCNC: 84 U/L (ref 40–150)
ALT SERPL W P-5'-P-CCNC: 14 U/L (ref 0–50)
ANION GAP SERPL CALCULATED.3IONS-SCNC: 15 MMOL/L (ref 7–15)
AST SERPL W P-5'-P-CCNC: 28 U/L (ref 0–45)
BASOPHILS # BLD AUTO: 0 10E3/UL (ref 0–0.2)
BASOPHILS NFR BLD AUTO: 0 %
BILIRUB SERPL-MCNC: 0.3 MG/DL
BUN SERPL-MCNC: 8.7 MG/DL (ref 6–20)
CALCIUM SERPL-MCNC: 9.2 MG/DL (ref 8.8–10.4)
CHLORIDE SERPL-SCNC: 103 MMOL/L (ref 98–107)
CREAT SERPL-MCNC: 0.83 MG/DL (ref 0.51–0.95)
EGFRCR SERPLBLD CKD-EPI 2021: 89 ML/MIN/1.73M2
EOSINOPHIL # BLD AUTO: 0 10E3/UL (ref 0–0.7)
EOSINOPHIL NFR BLD AUTO: 0 %
ERYTHROCYTE [DISTWIDTH] IN BLOOD BY AUTOMATED COUNT: 12.8 % (ref 10–15)
FERRITIN SERPL-MCNC: 165 NG/ML (ref 6–175)
GLUCOSE SERPL-MCNC: 90 MG/DL (ref 70–99)
HBA1C MFR BLD: 4.8 % (ref 0–5.6)
HCO3 SERPL-SCNC: 20 MMOL/L (ref 22–29)
HCT VFR BLD AUTO: 36.1 % (ref 35–47)
HGB BLD-MCNC: 12.6 G/DL (ref 11.7–15.7)
IMM GRANULOCYTES # BLD: 0 10E3/UL
IMM GRANULOCYTES NFR BLD: 0 %
LYMPHOCYTES # BLD AUTO: 2.1 10E3/UL (ref 0.8–5.3)
LYMPHOCYTES NFR BLD AUTO: 25 %
MCH RBC QN AUTO: 29.9 PG (ref 26.5–33)
MCHC RBC AUTO-ENTMCNC: 34.9 G/DL (ref 31.5–36.5)
MCV RBC AUTO: 86 FL (ref 78–100)
MONOCYTES # BLD AUTO: 0.4 10E3/UL (ref 0–1.3)
MONOCYTES NFR BLD AUTO: 4 %
NEUTROPHILS # BLD AUTO: 5.7 10E3/UL (ref 1.6–8.3)
NEUTROPHILS NFR BLD AUTO: 70 %
PLATELET # BLD AUTO: 391 10E3/UL (ref 150–450)
POTASSIUM SERPL-SCNC: 3.9 MMOL/L (ref 3.4–5.3)
PROT SERPL-MCNC: 7.8 G/DL (ref 6.4–8.3)
RBC # BLD AUTO: 4.21 10E6/UL (ref 3.8–5.2)
SODIUM SERPL-SCNC: 138 MMOL/L (ref 135–145)
T4 FREE SERPL-MCNC: 1.76 NG/DL (ref 0.9–1.7)
TSH SERPL DL<=0.005 MIU/L-ACNC: 0.63 UIU/ML (ref 0.3–4.2)
VIT B12 SERPL-MCNC: >4000 PG/ML (ref 232–1245)
WBC # BLD AUTO: 8.2 10E3/UL (ref 4–11)

## 2024-09-05 PROCEDURE — 85025 COMPLETE CBC W/AUTO DIFF WBC: CPT | Performed by: PHYSICIAN ASSISTANT

## 2024-09-05 PROCEDURE — 86038 ANTINUCLEAR ANTIBODIES: CPT | Performed by: PHYSICIAN ASSISTANT

## 2024-09-05 PROCEDURE — 83540 ASSAY OF IRON: CPT | Performed by: PHYSICIAN ASSISTANT

## 2024-09-05 PROCEDURE — 36415 COLL VENOUS BLD VENIPUNCTURE: CPT | Performed by: PHYSICIAN ASSISTANT

## 2024-09-05 PROCEDURE — 84439 ASSAY OF FREE THYROXINE: CPT | Performed by: PHYSICIAN ASSISTANT

## 2024-09-05 PROCEDURE — 82607 VITAMIN B-12: CPT | Performed by: PHYSICIAN ASSISTANT

## 2024-09-05 PROCEDURE — 83550 IRON BINDING TEST: CPT | Performed by: PHYSICIAN ASSISTANT

## 2024-09-05 PROCEDURE — 82728 ASSAY OF FERRITIN: CPT | Performed by: PHYSICIAN ASSISTANT

## 2024-09-05 PROCEDURE — 86235 NUCLEAR ANTIGEN ANTIBODY: CPT | Performed by: PHYSICIAN ASSISTANT

## 2024-09-05 PROCEDURE — 83036 HEMOGLOBIN GLYCOSYLATED A1C: CPT | Performed by: PHYSICIAN ASSISTANT

## 2024-09-05 PROCEDURE — 84443 ASSAY THYROID STIM HORMONE: CPT | Performed by: PHYSICIAN ASSISTANT

## 2024-09-05 PROCEDURE — 82306 VITAMIN D 25 HYDROXY: CPT | Performed by: PHYSICIAN ASSISTANT

## 2024-09-05 PROCEDURE — 80053 COMPREHEN METABOLIC PANEL: CPT | Performed by: PHYSICIAN ASSISTANT

## 2024-09-05 PROCEDURE — 99214 OFFICE O/P EST MOD 30 MIN: CPT | Performed by: PHYSICIAN ASSISTANT

## 2024-09-05 PROCEDURE — 86225 DNA ANTIBODY NATIVE: CPT | Performed by: PHYSICIAN ASSISTANT

## 2024-09-05 RX ORDER — POTASSIUM CHLORIDE 1500 MG/1
20 TABLET, EXTENDED RELEASE ORAL 2 TIMES DAILY
Qty: 180 TABLET | Refills: 3 | Status: SHIPPED | OUTPATIENT
Start: 2024-09-05

## 2024-09-05 RX ORDER — POTASSIUM CHLORIDE 1.5 G/1.58G
20 POWDER, FOR SOLUTION ORAL 2 TIMES DAILY
Qty: 60 PACKET | Refills: 2 | Status: SHIPPED | OUTPATIENT
Start: 2024-09-05

## 2024-09-05 NOTE — PROGRESS NOTES
Assessment & Plan     Hypokalemia  Acute on chronic, notes 3.2 potassium last week at rheumatology   Will recheck today, plan to increase potassium if needed pending results.   Consider nephrology consult  - Potassium  - potassium chloride ER (K-TAB) 20 MEQ CR tablet  Dispense: 180 tablet; Refill: 3  - potassium chloride (KLOR-CON) 20 MEQ packet  Dispense: 60 packet; Refill: 2  - Potassium    Hypothyroidism, unspecified type  stable  - TSH  - T4, free  - Vitamin B12  - TSH  - T4, free  - Vitamin B12    Thrombocytosis    Hypermobility syndrome  - Ferritin  - Vitamin D deficiency screening  - Iron & Iron Binding Capacity  - Vitamin D deficiency screening  - Iron & Iron Binding Capacity    Polyarthropathy  - Hemoglobin A1c  - Comprehensive metabolic panel  - CBC with Platelets & Differential  - SSA Ro ELAINE Antibody IgG  - SSB La ELAINE Antibody IgG  - DNA double stranded antibodies  - Anti Nuclear Jasmina IgG by IFA with Reflex                  Subjective   Anahy is a 44 year old, presenting for the following health issues:  discuss low potassium        9/5/2024     2:14 PM   Additional Questions   Roomed by Soumya   Accompanied by self     History of Present Illness       Reason for visit:  Low potassium    She eats 4 or more servings of fruits and vegetables daily.She consumes 0 sweetened beverage(s) daily.She exercises with enough effort to increase her heart rate 10 to 19 minutes per day.  She exercises with enough effort to increase her heart rate 5 days per week.   She is taking medications regularly.     Chronic hypokalemia since she was young. She takes lasix for her blood pressure, reports she tried many blood pressure medications and lasix was the only medication that was effective.  Previous working with cardiology but has not seen cardiology in 2 years as she went through treatment for thyroid cancer.  She has never seen nephrology but did have [facility].    She saw rheumatology last week and potassium level  was 3.2.  She takes 20 mEq of potassium daily she did bump that up to 40 mEq yesterday when she found out that her numbers were low.  In the past she has done potassium packets or gone in for IV infusions of potassium due to the hypokalemia.  Notes increased thirst but denies any other symptoms      Review of Systems  Constitutional, HEENT, cardiovascular, pulmonary, gi and gu systems are negative, except as otherwise noted.      Objective    BP (!) 134/98 (BP Location: Left arm, Patient Position: Sitting, Cuff Size: Adult Regular)   Pulse 107   Wt 69.9 kg (154 lb)   SpO2 97%   BMI 25.63 kg/m    Body mass index is 25.63 kg/m .  Physical Exam   GENERAL: alert and no distress  NECK: no adenopathy, no asymmetry, masses, or scars  RESP: lungs clear to auscultation - no rales, rhonchi or wheezes  CV: regular rate and rhythm,  no peripheral edema  ABDOMEN: soft, nontender, no hepatosplenomegaly, no masses and bowel sounds normal  MS: no gross musculoskeletal defects noted, no edema            Signed Electronically by: Shanice Joaquin PA-C

## 2024-09-05 NOTE — TELEPHONE ENCOUNTER
09/05/2024    Pt returned call. TC scheduled patient for appointment today in an approved only spot at 2:30 PM (arrival time 2:10 PM). Ok per Shanice Joaquin to use a Approved only spot.    Nothing else needed at this time.    [place]

## 2024-09-05 NOTE — TELEPHONE ENCOUNTER
I have never seen her for potassium, please contact pt to schedule a visit, ok to use any approval required or same day or we can double book if needed    Shanice Joaquin PA-C

## 2024-09-06 ENCOUNTER — MYC MEDICAL ADVICE (OUTPATIENT)
Dept: FAMILY MEDICINE | Facility: CLINIC | Age: 44
End: 2024-09-06

## 2024-09-06 DIAGNOSIS — E03.9 HYPOTHYROIDISM, UNSPECIFIED TYPE: Primary | ICD-10-CM

## 2024-09-06 DIAGNOSIS — E87.6 HYPOKALEMIA: ICD-10-CM

## 2024-09-06 DIAGNOSIS — E53.8 B12 DEFICIENCY: ICD-10-CM

## 2024-09-06 LAB
ANA SER QL IF: NEGATIVE
DSDNA AB SER-ACNC: 0.8 IU/ML
ENA SS-A AB SER IA-ACNC: <0.5 U/ML
ENA SS-A AB SER IA-ACNC: NEGATIVE
ENA SS-B IGG SER IA-ACNC: <0.6 U/ML
ENA SS-B IGG SER IA-ACNC: NEGATIVE
IRON BINDING CAPACITY (ROCHE): 242 UG/DL (ref 240–430)
IRON SATN MFR SERPL: 13 % (ref 15–46)
IRON SERPL-MCNC: 32 UG/DL (ref 37–145)
VIT D+METAB SERPL-MCNC: 53 NG/ML (ref 20–50)

## 2024-09-06 RX ORDER — LEVOTHYROXINE SODIUM 50 UG/1
50 TABLET ORAL DAILY
Qty: 90 TABLET | Refills: 3 | Status: SHIPPED | OUTPATIENT
Start: 2024-09-06

## 2024-09-06 NOTE — TELEPHONE ENCOUNTER
"9-6-24  Attempted to schedule LAB appt, pt declined appt for now.  Per pt she will determine where she wants to go for \"[place]\" and call back to let us know so we can fax order to where ever pt wants to go  Krsytal   "

## 2024-09-10 ENCOUNTER — TELEPHONE (OUTPATIENT)
Dept: FAMILY MEDICINE | Facility: CLINIC | Age: 44
End: 2024-09-10
Payer: COMMERCIAL

## 2024-09-24 ENCOUNTER — MYC MEDICAL ADVICE (OUTPATIENT)
Dept: FAMILY MEDICINE | Facility: CLINIC | Age: 44
End: 2024-09-24

## 2024-09-24 DIAGNOSIS — E04.1 THYROID NODULE: Primary | ICD-10-CM

## 2024-09-24 RX ORDER — LEVOTHYROXINE SODIUM 25 UG/1
25 TABLET ORAL DAILY
Qty: 90 TABLET | Refills: 3 | Status: SHIPPED | OUTPATIENT
Start: 2024-09-24

## 2024-10-07 ENCOUNTER — TELEPHONE (OUTPATIENT)
Dept: PHYSICAL MEDICINE AND REHAB | Facility: CLINIC | Age: 44
End: 2024-10-07
Payer: COMMERCIAL

## 2024-10-07 NOTE — TELEPHONE ENCOUNTER
Left Voicemail (1st Attempt) and Sent Mychart (1st Attempt) for the patient to call back and schedule the following:    Appointment type: Return  Provider: Dr. Cao  Return date: Dec. 2024  Specialty phone number: [phone]  Additional appointment(s) needed:   Additonal Notes:

## 2024-10-10 ENCOUNTER — TELEPHONE (OUTPATIENT)
Dept: PHYSICAL MEDICINE AND REHAB | Facility: CLINIC | Age: 44
End: 2024-10-10
Payer: COMMERCIAL

## 2024-10-10 NOTE — TELEPHONE ENCOUNTER
Sent Mychart (1st Attempt) and Left Voicemail (2nd Attempt) for the patient to call back and schedule the following:    Appointment type: Return  Provider: Dr. Cao  Return date: Dec. 2024  Specialty phone number: [phone]  Additional appointment(s) needed:   Additonal Notes:

## 2024-10-19 SDOH — HEALTH STABILITY: PHYSICAL HEALTH: ON AVERAGE, HOW MANY DAYS PER WEEK DO YOU ENGAGE IN MODERATE TO STRENUOUS EXERCISE (LIKE A BRISK WALK)?: 3 DAYS

## 2024-10-19 SDOH — HEALTH STABILITY: PHYSICAL HEALTH: ON AVERAGE, HOW MANY MINUTES DO YOU ENGAGE IN EXERCISE AT THIS LEVEL?: 10 MIN

## 2024-10-19 ASSESSMENT — SOCIAL DETERMINANTS OF HEALTH (SDOH): HOW OFTEN DO YOU GET TOGETHER WITH FRIENDS OR RELATIVES?: MORE THAN THREE TIMES A WEEK

## 2024-10-24 ENCOUNTER — OFFICE VISIT (OUTPATIENT)
Dept: FAMILY MEDICINE | Facility: CLINIC | Age: 44
End: 2024-10-24
Payer: COMMERCIAL

## 2024-10-24 VITALS
SYSTOLIC BLOOD PRESSURE: 138 MMHG | HEART RATE: 95 BPM | OXYGEN SATURATION: 99 % | TEMPERATURE: 97.7 F | WEIGHT: 144 LBS | HEIGHT: 66 IN | DIASTOLIC BLOOD PRESSURE: 94 MMHG | BODY MASS INDEX: 23.14 KG/M2

## 2024-10-24 DIAGNOSIS — R05.1 ACUTE COUGH: ICD-10-CM

## 2024-10-24 DIAGNOSIS — C73 MALIGNANT NEOPLASM OF THYROID GLAND (H): ICD-10-CM

## 2024-10-24 DIAGNOSIS — J45.40 MODERATE PERSISTENT ASTHMA WITHOUT COMPLICATION: ICD-10-CM

## 2024-10-24 DIAGNOSIS — E53.8 B12 DEFICIENCY: ICD-10-CM

## 2024-10-24 DIAGNOSIS — E03.9 HYPOTHYROIDISM, UNSPECIFIED TYPE: ICD-10-CM

## 2024-10-24 DIAGNOSIS — Z00.00 ROUTINE GENERAL MEDICAL EXAMINATION AT A HEALTH CARE FACILITY: Primary | ICD-10-CM

## 2024-10-24 DIAGNOSIS — E87.6 HYPOKALEMIA: ICD-10-CM

## 2024-10-24 PROBLEM — M24.80 GENERALIZED HYPERMOBILITY OF JOINTS: Status: ACTIVE | Noted: 2024-08-30

## 2024-10-24 LAB
ALBUMIN SERPL BCG-MCNC: 4.3 G/DL (ref 3.5–5.2)
ALP SERPL-CCNC: 88 U/L (ref 40–150)
ALT SERPL W P-5'-P-CCNC: 14 U/L (ref 0–50)
ANION GAP SERPL CALCULATED.3IONS-SCNC: 12 MMOL/L (ref 7–15)
AST SERPL W P-5'-P-CCNC: 20 U/L (ref 0–45)
BILIRUB SERPL-MCNC: 0.3 MG/DL
BUN SERPL-MCNC: 10.9 MG/DL (ref 6–20)
CALCIUM SERPL-MCNC: 9 MG/DL (ref 8.8–10.4)
CHLORIDE SERPL-SCNC: 103 MMOL/L (ref 98–107)
CREAT SERPL-MCNC: 0.88 MG/DL (ref 0.51–0.95)
EGFRCR SERPLBLD CKD-EPI 2021: 83 ML/MIN/1.73M2
ERYTHROCYTE [DISTWIDTH] IN BLOOD BY AUTOMATED COUNT: 13 % (ref 10–15)
GLUCOSE SERPL-MCNC: 93 MG/DL (ref 70–99)
HCO3 SERPL-SCNC: 25 MMOL/L (ref 22–29)
HCT VFR BLD AUTO: 36 % (ref 35–47)
HGB BLD-MCNC: 12.7 G/DL (ref 11.7–15.7)
MCH RBC QN AUTO: 30.7 PG (ref 26.5–33)
MCHC RBC AUTO-ENTMCNC: 35.3 G/DL (ref 31.5–36.5)
MCV RBC AUTO: 87 FL (ref 78–100)
PLATELET # BLD AUTO: 344 10E3/UL (ref 150–450)
POTASSIUM SERPL-SCNC: 3.8 MMOL/L (ref 3.4–5.3)
PROT SERPL-MCNC: 7.2 G/DL (ref 6.4–8.3)
RBC # BLD AUTO: 4.14 10E6/UL (ref 3.8–5.2)
SODIUM SERPL-SCNC: 140 MMOL/L (ref 135–145)
T4 FREE SERPL-MCNC: 1.39 NG/DL (ref 0.9–1.7)
TSH SERPL DL<=0.005 MIU/L-ACNC: 3.26 UIU/ML (ref 0.3–4.2)
VIT B12 SERPL-MCNC: >4000 PG/ML (ref 232–1245)
WBC # BLD AUTO: 7.1 10E3/UL (ref 4–11)

## 2024-10-24 PROCEDURE — 90471 IMMUNIZATION ADMIN: CPT | Performed by: PHYSICIAN ASSISTANT

## 2024-10-24 PROCEDURE — 85027 COMPLETE CBC AUTOMATED: CPT | Performed by: PHYSICIAN ASSISTANT

## 2024-10-24 PROCEDURE — 80053 COMPREHEN METABOLIC PANEL: CPT | Performed by: PHYSICIAN ASSISTANT

## 2024-10-24 PROCEDURE — 90656 IIV3 VACC NO PRSV 0.5 ML IM: CPT | Performed by: PHYSICIAN ASSISTANT

## 2024-10-24 PROCEDURE — 82607 VITAMIN B-12: CPT | Performed by: PHYSICIAN ASSISTANT

## 2024-10-24 PROCEDURE — 84439 ASSAY OF FREE THYROXINE: CPT | Performed by: PHYSICIAN ASSISTANT

## 2024-10-24 PROCEDURE — 99396 PREV VISIT EST AGE 40-64: CPT | Mod: 25 | Performed by: PHYSICIAN ASSISTANT

## 2024-10-24 PROCEDURE — 99213 OFFICE O/P EST LOW 20 MIN: CPT | Mod: 25 | Performed by: PHYSICIAN ASSISTANT

## 2024-10-24 PROCEDURE — 36415 COLL VENOUS BLD VENIPUNCTURE: CPT | Performed by: PHYSICIAN ASSISTANT

## 2024-10-24 PROCEDURE — 90472 IMMUNIZATION ADMIN EACH ADD: CPT | Performed by: PHYSICIAN ASSISTANT

## 2024-10-24 PROCEDURE — 84443 ASSAY THYROID STIM HORMONE: CPT | Performed by: PHYSICIAN ASSISTANT

## 2024-10-24 PROCEDURE — 90677 PCV20 VACCINE IM: CPT | Performed by: PHYSICIAN ASSISTANT

## 2024-10-24 ASSESSMENT — ANXIETY QUESTIONNAIRES
IF YOU CHECKED OFF ANY PROBLEMS ON THIS QUESTIONNAIRE, HOW DIFFICULT HAVE THESE PROBLEMS MADE IT FOR YOU TO DO YOUR WORK, TAKE CARE OF THINGS AT HOME, OR GET ALONG WITH OTHER PEOPLE: NOT DIFFICULT AT ALL
8. IF YOU CHECKED OFF ANY PROBLEMS, HOW DIFFICULT HAVE THESE MADE IT FOR YOU TO DO YOUR WORK, TAKE CARE OF THINGS AT HOME, OR GET ALONG WITH OTHER PEOPLE?: NOT DIFFICULT AT ALL
6. BECOMING EASILY ANNOYED OR IRRITABLE: NOT AT ALL
1. FEELING NERVOUS, ANXIOUS, OR ON EDGE: SEVERAL DAYS
4. TROUBLE RELAXING: NOT AT ALL
7. FEELING AFRAID AS IF SOMETHING AWFUL MIGHT HAPPEN: NOT AT ALL
3. WORRYING TOO MUCH ABOUT DIFFERENT THINGS: NOT AT ALL
GAD7 TOTAL SCORE: 1
5. BEING SO RESTLESS THAT IT IS HARD TO SIT STILL: NOT AT ALL
7. FEELING AFRAID AS IF SOMETHING AWFUL MIGHT HAPPEN: NOT AT ALL
2. NOT BEING ABLE TO STOP OR CONTROL WORRYING: NOT AT ALL

## 2024-10-24 ASSESSMENT — PATIENT HEALTH QUESTIONNAIRE - PHQ9
SUM OF ALL RESPONSES TO PHQ QUESTIONS 1-9: 0
SUM OF ALL RESPONSES TO PHQ QUESTIONS 1-9: 0
10. IF YOU CHECKED OFF ANY PROBLEMS, HOW DIFFICULT HAVE THESE PROBLEMS MADE IT FOR YOU TO DO YOUR WORK, TAKE CARE OF THINGS AT HOME, OR GET ALONG WITH OTHER PEOPLE: NOT DIFFICULT AT ALL

## 2024-10-24 NOTE — PROGRESS NOTES
Answers submitted by the patient for this visit:  Patient Health Questionnaire (Submitted on 10/24/2024)  If you checked off any problems, how difficult have these problems made it for you to do your work, take care of things at home, or get along with other people?: Not difficult at all  PHQ9 TOTAL SCORE: 0  Patient Health Questionnaire (G7) (Submitted on 10/24/2024)  YONY 7 TOTAL SCORE: 1  Preventive Care Visit  [facility] LEVON Joaquin PA-C, Family Medicine  Oct 24, 2024      Assessment & Plan     Routine general medical examination at a health care facility  - CBC with platelets  - Comprehensive metabolic panel (BMP + Alb, Alk Phos, ALT, AST, Total. Bili, TP)  - CBC with platelets  - Comprehensive metabolic panel (BMP + Alb, Alk Phos, ALT, AST, Total. Bili, TP)    Acute cough  - XR Chest 2 Views    Hypokalemia    B12 deficiency  - Vitamin B12    Hypothyroidism, unspecified type  - TSH  - T4, free     Malignant neoplasm of thyroid gland (H)  Monitoring thyroid function    Moderate persistent asthma without complication  Well controlled            Counseling  Appropriate preventive services were addressed with this patient via screening, questionnaire, or discussion as appropriate for fall prevention, nutrition, physical activity, Tobacco-use cessation, social engagement, weight loss and cognition.  Checklist reviewing preventive services available has been given to the patient.          Corwin Higginbotham is a 44 year old, presenting for the following:  Physical (Pt is not fasting)        10/24/2024     9:53 AM   Additional Questions   Roomed by Soumya   Accompanied by self          HPI    Walking 3-4 times per week for at least 10 minutes.     See rheumatology at [facility]       Health Care Directive  Patient does not have a Health Care Directive: Discussed advance care planning with patient; however, patient declined at this time.      10/19/2024   General Health   How would you rate your  overall physical health? Good   Feel stress (tense, anxious, or unable to sleep) To some extent      (!) STRESS CONCERN      10/19/2024   Nutrition   Three or more servings of calcium each day? Yes   Diet: Regular (no restrictions)   How many servings of fruit and vegetables per day? 4 or more   How many sweetened beverages each day? 0-1            10/19/2024   Exercise   Days per week of moderate/strenous exercise 3 days   Average minutes spent exercising at this level 10 min            10/19/2024   Social Factors   Frequency of gathering with friends or relatives More than three times a week   Worry food won't last until get money to buy more No   Food not last or not have enough money for food? No   Do you have housing? (Housing is defined as stable permanent housing and does not include staying ouside in a car, in a tent, in an abandoned building, in an overnight shelter, or couch-surfing.) No   Are you worried about losing your housing? No   Lack of transportation? No   Unable to get utilities (heat,electricity)? No   Want help with housing or utility concern? No      (!) HOUSING CONCERN PRESENT      10/19/2024   Dental   Dentist two times every year? Yes            10/19/2024   TB Screening   Were you born outside of the [place]? No          Today's PHQ-9 Score:       10/24/2024     9:46 AM   PHQ-9 SCORE   PHQ-9 Total Score MyChart 0   PHQ-9 Total Score 0        Patient-reported         10/19/2024   Substance Use   Alcohol more than 3/day or more than 7/wk No   Do you use any other substances recreationally? No        Social History     Tobacco Use    Smoking status: Never    Smokeless tobacco: Never   Vaping Use    Vaping status: Never Used   Substance Use Topics    Alcohol use: Yes     Comment: Socially    Drug use: Never           7/11/2023   LAST FHS-7 RESULTS   1st degree relative breast or ovarian cancer Yes    Any relative bilateral breast cancer Unknown    Any male have breast cancer No    Any ONE woman  "have BOTH breast AND ovarian cancer Yes    Any woman with breast cancer before 50yrs Yes    2 or more relatives with breast AND/OR ovarian cancer Yes    2 or more relatives with breast AND/OR bowel cancer No        Patient-reported        Mammogram Screening - Mammogram every 1-2 years updated in Health Maintenance based on mutual decision making    Gets mammogram at [facility], last mammo was 5/24/2024.         10/19/2024   STI Screening   New sexual partner(s) since last STI/HIV test? No        History of abnormal Pap smear: has pap with [facility], last pap was 6/4/2024, nil, negative hpv        Latest Ref Rng & Units 11/30/2020    12:40 PM   PAP / HPV   PAP Negative for squamous intraepithelial lesion or malignancy. Unsatisfactory for evaluation  Electronically signed by Tahmina Beckwith CT (ASCP) on 12/7/2020 at 11:04 AM      HPV 16 DNA NEG Negative    HPV 18 DNA NEG Negative    Other HR HPV NEG Negative      ASCVD Risk   The 10-year ASCVD risk score (Laron DK, et al., 2019) is: 0.6%    Values used to calculate the score:      Age: 44 years      Sex: Female      Is Non- : No      Diabetic: No      Tobacco smoker: No      Systolic Blood Pressure: 138 mmHg      Is BP treated: No      HDL Cholesterol: 75 mg/dL      Total Cholesterol: 209 mg/dL        10/19/2024   Contraception/Family Planning   Questions about contraception or family planning No           Reviewed and updated as needed this visit by Provider                          Review of Systems  Constitutional, HEENT, cardiovascular, pulmonary, gi and gu systems are negative, except as otherwise noted.     Objective    Exam  BP (!) 138/94   Pulse 95   Temp 97.7  F (36.5  C) (Temporal)   Ht 1.676 m (5' 6\")   Wt 65.3 kg (144 lb)   SpO2 99%   BMI 23.24 kg/m     Estimated body mass index is 23.24 kg/m  as calculated from the following:    Height as of this encounter: 1.676 m (5' 6\").    Weight as of this " encounter: 65.3 kg (144 lb).    Physical Exam  GENERAL: alert and no distress  EYES: Eyes grossly normal to inspection, PERRL and conjunctivae and sclerae normal  HENT: ear canals and TM's normal, nose and mouth without ulcers or lesions  NECK: no adenopathy, no asymmetry, masses, or scars  RESP: lungs clear to auscultation - no rales, rhonchi or wheezes  CV: regular rate and rhythm, normal S1 S2, no S3 or S4, no murmur, click or rub, no peripheral edema  ABDOMEN: soft, nontender, no hepatosplenomegaly, no masses and bowel sounds normal  MS: no gross musculoskeletal defects noted, no edema  SKIN: no suspicious lesions or rashes  NEURO: Normal strength and tone, mentation intact and speech normal  PSYCH: mentation appears normal, affect normal/bright        Signed Electronically by: Shanice Joaquin PA-C

## 2024-10-24 NOTE — PATIENT INSTRUCTIONS
Patient Education   Preventive Care Advice   This is general advice given by our system to help you stay healthy. However, your care team may have specific advice just for you. Please talk to your care team about your preventive care needs.  Nutrition  Eat 5 or more servings of fruits and vegetables each day.  Try wheat bread, brown rice and whole grain pasta (instead of white bread, rice, and pasta).  Get enough calcium and vitamin D. Check the label on foods and aim for 100% of the RDA (recommended daily allowance).  Lifestyle  Exercise at least 150 minutes each week  (30 minutes a day, 5 days a week).  Do muscle strengthening activities 2 days a week. These help control your weight and prevent disease.  No smoking.  Wear sunscreen to prevent skin cancer.  Have a dental exam and cleaning every 6 months.  Yearly exams  See your health care team every year to talk about:  Any changes in your health.  Any medicines your care team has prescribed.  Preventive care, family planning, and ways to prevent chronic diseases.  Shots (vaccines)   HPV shots (up to age 26), if you've never had them before.  Hepatitis B shots (up to age 59), if you've never had them before.  COVID-19 shot: Get this shot when it's due.  Flu shot: Get a flu shot every year.  Tetanus shot: Get a tetanus shot every 10 years.  Pneumococcal, hepatitis A, and RSV shots: Ask your care team if you need these based on your risk.  Shingles shot (for age 50 and up)  General health tests  Diabetes screening:  Starting at age 35, Get screened for diabetes at least every 3 years.  If you are younger than age 35, ask your care team if you should be screened for diabetes.  Cholesterol test: At age 39, start having a cholesterol test every 5 years, or more often if advised.  Bone density scan (DEXA): At age 50, ask your care team if you should have this scan for osteoporosis (brittle bones).  Hepatitis C: Get tested at least once in your life.  STIs (sexually  transmitted infections)  Before age 24: Ask your care team if you should be screened for STIs.  After age 24: Get screened for STIs if you're at risk. You are at risk for STIs (including HIV) if:  You are sexually active with more than one person.  You don't use condoms every time.  You or a partner was diagnosed with a sexually transmitted infection.  If you are at risk for HIV, ask about PrEP medicine to prevent HIV.  Get tested for HIV at least once in your life, whether you are at risk for HIV or not.  Cancer screening tests  Cervical cancer screening: If you have a cervix, begin getting regular cervical cancer screening tests starting at age 21.  Breast cancer scan (mammogram): If you've ever had breasts, begin having regular mammograms starting at age 40. This is a scan to check for breast cancer.  Colon cancer screening: It is important to start screening for colon cancer at age 45.  Have a colonoscopy test every 10 years (or more often if you're at risk) Or, ask your provider about stool tests like a FIT test every year or Cologuard test every 3 years.  To learn more about your testing options, visit:   .  For help making a decision, visit:   https://bit.ly/rg43997.  Prostate cancer screening test: If you have a prostate, ask your care team if a prostate cancer screening test (PSA) at age 55 is right for you.  Lung cancer screening: If you are a current or former smoker ages 50 to 80, ask your care team if ongoing lung cancer screenings are right for you.  For informational purposes only. Not to replace the advice of your health care provider. Copyright   2023 [employer]. All rights reserved. Clinically reviewed by the [facility] Transitions Program. [employer] 053638 - REV 01/24.

## 2024-10-31 RX ORDER — MODAFINIL 100 MG/1
100 TABLET ORAL DAILY
Qty: 30 TABLET | Refills: 3 | Status: SHIPPED | OUTPATIENT
Start: 2024-10-31

## 2024-11-06 ENCOUNTER — MYC MEDICAL ADVICE (OUTPATIENT)
Dept: FAMILY MEDICINE | Facility: CLINIC | Age: 44
End: 2024-11-06
Payer: COMMERCIAL

## 2024-11-06 DIAGNOSIS — I10 PRIMARY HYPERTENSION: ICD-10-CM

## 2024-11-06 DIAGNOSIS — S06.0X9A CONCUSSION WITH LOSS OF CONSCIOUSNESS, INITIAL ENCOUNTER: ICD-10-CM

## 2024-11-06 DIAGNOSIS — M79.7 FIBROMYALGIA: ICD-10-CM

## 2024-11-06 RX ORDER — FUROSEMIDE 20 MG/1
TABLET ORAL
Qty: 270 TABLET | Refills: 0 | Status: SHIPPED | OUTPATIENT
Start: 2024-11-06

## 2024-11-06 NOTE — TELEPHONE ENCOUNTER
11-6-24  Qued up Lasix request.  CARE TEAM: please update pt's vaccine record see attachment   Krsytal

## 2024-11-07 ENCOUNTER — TELEPHONE (OUTPATIENT)
Dept: PHYSICAL MEDICINE AND REHAB | Facility: CLINIC | Age: 44
End: 2024-11-07
Payer: COMMERCIAL

## 2024-11-07 RX ORDER — MODAFINIL 100 MG/1
TABLET ORAL
Qty: 90 TABLET | Refills: 1 | Status: SHIPPED | OUTPATIENT
Start: 2024-11-07

## 2024-11-07 RX ORDER — FUROSEMIDE 20 MG/1
TABLET ORAL
Qty: 270 TABLET | Refills: 0 | Status: SHIPPED | OUTPATIENT
Start: 2024-11-07

## 2024-11-07 NOTE — TELEPHONE ENCOUNTER
Health Call Center    Phone Message    May a detailed message be left on voicemail: yes     Reason for Call: Medication Question or concern regarding medication   Prescription Clarification  Name of Medication: modafinil (PROVIGIL) 100 MG tablet   Prescribing Provider:     Maryann Cao MD      Pharmacy: [facility] - [facility] [address] [facility]    What on the order needs clarification? Per Pharmacy patient was prescribed this medication in August with instructions for 2 tablets per day PRN. On 31Oct a new prescription was written for 1 tablet per day PRN. Patient contacted Pharmacy and requested to have this changed to one tablet per day, with the option of a second PRN. Please contact Pharmacy to discuss, thanks!    Action Taken: Message routed to:  [facility] PM[facility]    Travel Screening: Not Applicable     Date of Service:

## 2024-11-07 NOTE — TELEPHONE ENCOUNTER
I did update the Hep A, but I do not see dates for the Hep B to add them to her vaccination list.   Santa Bundy CMA ............... 8:22 AM, 11/07/24

## 2024-11-11 ENCOUNTER — TELEPHONE (OUTPATIENT)
Dept: FAMILY MEDICINE | Facility: CLINIC | Age: 44
End: 2024-11-11
Payer: COMMERCIAL

## 2024-11-11 NOTE — TELEPHONE ENCOUNTER
Prior Authorization  Please initiate PA, med/dosage not covered by insurance.    Medication: modafinil (PROVIGIL) 100 MG tablet     Insurance Name: [facility] OUT OF STATE  Insurance ID: [id number]  Cover My Meds Key: TR    Pharmacy:    [facility] - [place] [id number] [facility]

## 2024-11-12 NOTE — TELEPHONE ENCOUNTER
Writer called [facility] to inform them that Dr. Cao does not agree with having the additional prn dose of modafinil daily and only recommends one tablet daily for her.  Was informed by the pharmacy that another prescription was sent in on 11/7 by a family practice provider with the additional directions, but this requires a Prior Authorization.  She asked if we would need information to do the PA but writer informed her that the prescribing provider will need to work on the PA as Dr. Cao only wanted patient to take one tablet daily.  She stated that she will work with them and patient on this.    Jordana Ordaz RN on 11/12/2024 at 11:55 AM

## 2024-11-14 RX ORDER — LORAZEPAM 1 MG/1
1 TABLET ORAL 3 TIMES DAILY PRN
Qty: 90 TABLET | Refills: 1 | Status: SHIPPED | OUTPATIENT
Start: 2024-11-14

## 2024-11-14 NOTE — TELEPHONE ENCOUNTER
PRIOR AUTHORIZATION DENIED    Medication: MODAFINIL 100 MG PO TABS  Insurance Company: [employer] Non-Specialty PA's - Phone [phone] Fax [phone]  Denial Date: 11/14/2024  Denial Reason(s):         Appeal Information:

## 2024-11-14 NOTE — TELEPHONE ENCOUNTER
Retail Pharmacy Prior Authorization Team   Phone: [phone]    PA Initiation    Medication: MODAFINIL 100 MG PO TABS  Insurance Company: [employer] Non-Specialty PA's - Phone [phone] Fax [phone]  Pharmacy Filling the Rx: [facility] - [place], [place] - [address] [place] [address]  Filling Pharmacy Phone: [phone]  Filling Pharmacy Fax: [phone]  Start Date: 11/14/2024

## 2024-11-14 NOTE — TELEPHONE ENCOUNTER
MALLY FLORES for Modafinil got denied and a refill of Ativan was sent today to her pharmacy. Pt can call her insurance asking for which medication they cover instead of Modafinil and let Shanice know and she can prescribe for her.

## 2024-11-14 NOTE — TELEPHONE ENCOUNTER
11-14-24  Called pt LM for the PA question, in my msg since pt didn't indicate what med needed the PA I stated in my msg a PA was sent to PA team for modafinil (PROVIGIL) 100 MG tablet on 11-11 & PA hasn't been started yet.    As far as pt's question on the Ativan TC didn't understand patients question and TC asked pt to call back to clarify.  Viola msg sent asking pt to call to clarify the Ativan concern  Mary

## 2024-11-18 ENCOUNTER — MYC MEDICAL ADVICE (OUTPATIENT)
Dept: PHYSICAL MEDICINE AND REHAB | Facility: CLINIC | Age: 44
End: 2024-11-18
Payer: COMMERCIAL

## 2024-11-18 ENCOUNTER — MYC MEDICAL ADVICE (OUTPATIENT)
Dept: FAMILY MEDICINE | Facility: CLINIC | Age: 44
End: 2024-11-18
Payer: COMMERCIAL

## 2024-11-20 ENCOUNTER — VIRTUAL VISIT (OUTPATIENT)
Dept: PHYSICAL MEDICINE AND REHAB | Facility: CLINIC | Age: 44
End: 2024-11-20
Payer: COMMERCIAL

## 2024-11-20 VITALS — WEIGHT: 144 LBS | BODY MASS INDEX: 23.14 KG/M2 | HEIGHT: 66 IN

## 2024-11-20 DIAGNOSIS — U09.9 LONG COVID: Primary | ICD-10-CM

## 2024-11-20 SDOH — SOCIAL STABILITY: SOCIAL NETWORK: I HAVE TROUBLE DOING ALL OF MY USUAL WORK (INCLUDE WORK AT HOME): RARELY

## 2024-11-20 SDOH — SOCIAL STABILITY: SOCIAL NETWORK: I HAVE TROUBLE DOING ALL OF THE ACTIVITIES WITH FRIENDS THAT I WANT TO DO: RARELY

## 2024-11-20 SDOH — SOCIAL STABILITY: SOCIAL NETWORK: I HAVE TROUBLE DOING ALL OF THE FAMILY ACTIVITIES THAT I WANT TO DO: RARELY

## 2024-11-20 SDOH — SOCIAL STABILITY: SOCIAL NETWORK: PROMIS ABILITY TO PARTICIPATE IN SOCIAL ROLES & ACTIVITIES T-SCORE: 54

## 2024-11-20 SDOH — SOCIAL STABILITY: SOCIAL NETWORK

## 2024-11-20 SDOH — SOCIAL STABILITY: SOCIAL NETWORK: I HAVE TROUBLE DOING ALL OF MY REGULAR LEISURE ACTIVITIES WITH OTHERS: NEVER

## 2024-11-20 ASSESSMENT — ANXIETY QUESTIONNAIRES
8. IF YOU CHECKED OFF ANY PROBLEMS, HOW DIFFICULT HAVE THESE MADE IT FOR YOU TO DO YOUR WORK, TAKE CARE OF THINGS AT HOME, OR GET ALONG WITH OTHER PEOPLE?: NOT DIFFICULT AT ALL
7. FEELING AFRAID AS IF SOMETHING AWFUL MIGHT HAPPEN: NOT AT ALL
3. WORRYING TOO MUCH ABOUT DIFFERENT THINGS: NOT AT ALL
6. BECOMING EASILY ANNOYED OR IRRITABLE: NOT AT ALL
2. NOT BEING ABLE TO STOP OR CONTROL WORRYING: NOT AT ALL
4. TROUBLE RELAXING: NOT AT ALL
GAD7 TOTAL SCORE: 0
7. FEELING AFRAID AS IF SOMETHING AWFUL MIGHT HAPPEN: NOT AT ALL
IF YOU CHECKED OFF ANY PROBLEMS ON THIS QUESTIONNAIRE, HOW DIFFICULT HAVE THESE PROBLEMS MADE IT FOR YOU TO DO YOUR WORK, TAKE CARE OF THINGS AT HOME, OR GET ALONG WITH OTHER PEOPLE: NOT DIFFICULT AT ALL
GAD7 TOTAL SCORE: 0
1. FEELING NERVOUS, ANXIOUS, OR ON EDGE: NOT AT ALL
5. BEING SO RESTLESS THAT IT IS HARD TO SIT STILL: NOT AT ALL
GAD7 TOTAL SCORE: 0

## 2024-11-20 NOTE — PROGRESS NOTES
"Anahy is a 44 year old who is being evaluated via a billable video visit.    How would you like to obtain your AVS? MyChart  If the video visit is dropped, the invitation should be resent by: Text to cell phone: [phone]  Will anyone else be joining your video visit? No  {If patient encounters technical issues they should call [phone] :823194}    {PROVIDER CHARTING PREFERENCE:945765}    Subjective   Anahy is a 44 year old, presenting for the following health issues:  Follow Up  Follow Up    HPI     {POST COVID-19 NOTE TEMPLATES:089210}    {ROS Picklists (Optional):811030}      Objective           Vitals:  No vitals were obtained today due to virtual visit.    Physical Exam   {video visit exam brief selected:458941}    {Diagnostic Test Results (Optional):245961}      Video-Visit Details    Type of service:  Video Visit   Originating Location (pt. Location): {video visit patient location:507929::\"Home\"}  {PROVIDER LOCATION On-site should be selected for visits conducted from your clinic location or adjoining [facility] hospital, academic office, or other nearby [facility] building. Off-site should be selected for all other provider locations, including home:677638}  Distant Location (provider location):  {virtual location provider:029679}  Platform used for Video Visit: {Virtual Visit Platforms:115964::\"LUBB-TEX\"}  Signed Electronically by: Maryann Cao MD  {Email feedback regarding this note to [email or web address]   :439035}  Answers submitted by the patient for this visit:  Patient Health Questionnaire (Submitted on 11/20/2024)  If you checked off any problems, how difficult have these problems made it for you to do your work, take care of things at home, or get along with other people?: Not difficult at all  PHQ9 TOTAL SCORE: 0  Patient Health Questionnaire (G7) (Submitted on 11/20/2024)  YONY 7 TOTAL SCORE: 0    "

## 2024-11-20 NOTE — NURSING NOTE
Current patient location: [address] [place] [address]    Is the patient currently in the state of [place]? YES    Visit mode:VIDEO    If the visit is dropped, the patient can be reconnected by:VIDEO VISIT: Text to cell phone:   Telephone Information:   Mobile [phone]       Will anyone else be joining the visit? NO  (If patient encounters technical issues they should call [phone] :[phone])    Are changes needed to the allergy or medication list? No    Are refills needed on medications prescribed by this physician? Discuss with provider    Rooming Documentation:  Questionnaire(s) completed    Reason for visit: Follow Up    Aleja FLORENTINO

## 2024-12-23 ENCOUNTER — MYC MEDICAL ADVICE (OUTPATIENT)
Dept: PHYSICAL MEDICINE AND REHAB | Facility: CLINIC | Age: 44
End: 2024-12-23
Payer: COMMERCIAL

## 2024-12-23 DIAGNOSIS — U09.9 LONG COVID: ICD-10-CM

## 2024-12-23 DIAGNOSIS — M79.7 FIBROMYALGIA: ICD-10-CM

## 2024-12-23 DIAGNOSIS — G93.32 CHRONIC FATIGUE SYNDROME: ICD-10-CM

## 2024-12-24 NOTE — TELEPHONE ENCOUNTER
Refill request received from patient    Medication: predniSONE (DELTASONE) 5 MG tablet   Directions: Take 1 tablet (5 mg) by mouth daily.      Last ordered: 8/26/24   Qty: #30  RF: 1  Last OV: 11/20/24  Next OV: None scheduled - patient aware    Routing to Dr. Cao to review and sign if appropriate.    Jordana Ordaz, RN Care Coordinator  [facility]   [facility] main phone # [phone] fax # [phone]

## 2024-12-31 RX ORDER — PREDNISONE 5 MG/1
5 TABLET ORAL DAILY
Qty: 30 TABLET | Refills: 1 | Status: SHIPPED | OUTPATIENT
Start: 2024-12-31

## 2025-01-22 ENCOUNTER — VIRTUAL VISIT (OUTPATIENT)
Dept: FAMILY MEDICINE | Facility: CLINIC | Age: 45
End: 2025-01-22
Payer: COMMERCIAL

## 2025-01-22 DIAGNOSIS — M79.7 FIBROMYALGIA: ICD-10-CM

## 2025-01-22 DIAGNOSIS — R05.1 ACUTE COUGH: ICD-10-CM

## 2025-01-22 DIAGNOSIS — U09.9 LONG COVID: Primary | ICD-10-CM

## 2025-01-22 DIAGNOSIS — I10 PRIMARY HYPERTENSION: ICD-10-CM

## 2025-01-22 DIAGNOSIS — E04.1 THYROID NODULE: ICD-10-CM

## 2025-01-22 DIAGNOSIS — G93.32 CHRONIC FATIGUE SYNDROME: ICD-10-CM

## 2025-01-22 DIAGNOSIS — J30.2 SEASONAL ALLERGIC RHINITIS, UNSPECIFIED TRIGGER: ICD-10-CM

## 2025-01-22 PROCEDURE — 98006 SYNCH AUDIO-VIDEO EST MOD 30: CPT | Performed by: PHYSICIAN ASSISTANT

## 2025-01-22 RX ORDER — LORAZEPAM 1 MG/1
1 TABLET ORAL EVERY 6 HOURS PRN
Qty: 90 TABLET | Refills: 1 | Status: SHIPPED | OUTPATIENT
Start: 2025-01-22

## 2025-01-22 RX ORDER — CODEINE PHOSPHATE AND GUAIFENESIN 10; 100 MG/5ML; MG/5ML
1-2 SOLUTION ORAL EVERY 6 HOURS PRN
Qty: 240 ML | Refills: 0 | Status: SHIPPED | OUTPATIENT
Start: 2025-01-22

## 2025-01-22 RX ORDER — LORATADINE PSEUDOEPHEDRINE SULFATE 10; 240 MG/1; MG/1
1 TABLET, EXTENDED RELEASE ORAL DAILY
Qty: 90 TABLET | Refills: 3 | Status: SHIPPED | OUTPATIENT
Start: 2025-01-22 | End: 2025-01-22

## 2025-01-22 RX ORDER — FUROSEMIDE 20 MG/1
TABLET ORAL
Qty: 180 TABLET | Refills: 3 | Status: SHIPPED | OUTPATIENT
Start: 2025-01-22

## 2025-01-22 RX ORDER — LORATADINE PSEUDOEPHEDRINE SULFATE 10; 240 MG/1; MG/1
1 TABLET, EXTENDED RELEASE ORAL DAILY
Qty: 90 TABLET | Refills: 3 | Status: SHIPPED | OUTPATIENT
Start: 2025-01-22

## 2025-01-22 RX ORDER — PREDNISONE 5 MG/1
5 TABLET ORAL DAILY
Qty: 90 TABLET | Refills: 1 | Status: SHIPPED | OUTPATIENT
Start: 2025-01-22

## 2025-01-22 NOTE — PROGRESS NOTES
Anahy is a 44 year old who is being evaluated via a billable video visit.    How would you like to obtain your AVS? MyChart  If the video visit is dropped, the invitation should be resent by: Text to cell phone: [phone]  Will anyone else be joining your video visit? No      Assessment & Plan     Long COVID  Managed by long covid specialist  - Comprehensive metabolic panel (BMP + Alb, Alk Phos, ALT, AST, Total. Bili, TP)  - CBC with platelets  - predniSONE (DELTASONE) 5 MG tablet  Dispense: 90 tablet; Refill: 1    Thyroid nodule  Stable, not on thyroid medication currently  - TSH  - T4, free    Seasonal allergic rhinitis, unspecified trigger  Takes claritin d daily  - loratadine-pseudoePHEDrine (CLARITIN-D 24 HOUR)  MG 24 hr tablet  Dispense: 90 tablet; Refill: 3    Fibromyalgia  Needs refills on ativan, takes one daily and prednisone  - LORazepam (ATIVAN) 1 MG tablet  Dispense: 90 tablet; Refill: 1  - predniSONE (DELTASONE) 5 MG tablet  Dispense: 90 tablet; Refill: 1    Primary hypertension  stable  - furosemide (LASIX) 20 MG tablet  Dispense: 180 tablet; Refill: 3    Chronic fatigue syndrome  stable  - predniSONE (DELTASONE) 5 MG tablet  Dispense: 90 tablet; Refill: 1    Acute cough  Has used robitussin ac in the past, requests a refill  - guaiFENesin-codeine (ROBITUSSIN AC) 100-10 MG/5ML solution  Dispense: 240 mL; Refill: 0    The longitudinal plan of care for the diagnosis(es)/condition(s) as documented were addressed during this visit. Due to the added complexity in care, I will continue to support Anahy in the subsequent management and with ongoing continuity of care.                Subjective   Anahy is a 44 year old, presenting for the following health issues:  Follow Up (Immune system.)      1/22/2025     8:37 AM   Additional Questions   Roomed by Fanny MONTOYA     History of Present Illness       Reason for visit:  Follow up on current status and plan for care - stress high    She eats 2-3  servings of fruits and vegetables daily.She consumes 1 sweetened beverage(s) daily.She exercises with enough effort to increase her heart rate 10 to 19 minutes per day.  She exercises with enough effort to increase her heart rate 3 or less days per week.   She is taking medications regularly.     Long covid- sees a long covid specialist, previously on prednisone but has tapered off.  Continues to have pain and brain fog.    Functional medicine- on semaglutide which has improved symptoms and she feels better overall.     Stress- working through legal issues with her former employer which is really stressful for her. She sees a therapist weekly.       Review of Systems  Constitutional, HEENT, cardiovascular, pulmonary, gi and gu systems are negative, except as otherwise noted.      Objective           Vitals:  No vitals were obtained today due to virtual visit.    Physical Exam   GENERAL: alert and no distress  EYES: Eyes grossly normal to inspection.  No discharge or erythema, or obvious scleral/conjunctival abnormalities.  RESP: No audible wheeze, cough, or visible cyanosis.    SKIN: Visible skin clear. No significant rash, abnormal pigmentation or lesions.  NEURO: Cranial nerves grossly intact.  Mentation and speech appropriate for age.  PSYCH: Appropriate affect, tone, and pace of words          Video-Visit Details    Type of service:  Video Visit   Originating Location (pt. Location): Home    Distant Location (provider location):  Off-site  Platform used for Video Visit: Augusto  Signed Electronically by: Shanice Joaquin PA-C

## 2025-04-07 NOTE — TELEPHONE ENCOUNTER
Michelle: pt declined e-consult with hematology pending appt with you on 4/6/23.    NASRA Brito RN  [facility]     What Is The Reason For Today's Visit?: Full Body Skin Examination with No Concerns What Is The Reason For Today's Visit? (Being Monitored For X): concerning skin lesions on an annual basis What Type Of Note Output Would You Prefer (Optional)?: Standard Output

## 2025-04-28 LAB
CRP SERPL-MCNC: <3 MG/L
LDH SERPL L TO P-CCNC: 183 U/L (ref 0–250)

## 2025-04-29 LAB — ALDOLASE SERPL-CCNC: 2.3 U/L
